# Patient Record
Sex: MALE | Race: BLACK OR AFRICAN AMERICAN | Employment: FULL TIME | URBAN - METROPOLITAN AREA
[De-identification: names, ages, dates, MRNs, and addresses within clinical notes are randomized per-mention and may not be internally consistent; named-entity substitution may affect disease eponyms.]

---

## 2017-02-20 ENCOUNTER — TELEPHONE (OUTPATIENT)
Dept: FAMILY MEDICINE CLINIC | Age: 52
End: 2017-02-20

## 2017-02-20 RX ORDER — AMLODIPINE BESYLATE AND BENAZEPRIL HYDROCHLORIDE 10; 20 MG/1; MG/1
CAPSULE ORAL
Qty: 30 CAPSULE | Refills: 3 | Status: SHIPPED | OUTPATIENT
Start: 2017-02-20 | End: 2017-07-09 | Stop reason: SDUPTHER

## 2017-04-11 ENCOUNTER — OFFICE VISIT (OUTPATIENT)
Dept: FAMILY MEDICINE CLINIC | Age: 52
End: 2017-04-11

## 2017-04-11 VITALS
HEART RATE: 81 BPM | HEIGHT: 74 IN | BODY MASS INDEX: 40.43 KG/M2 | DIASTOLIC BLOOD PRESSURE: 84 MMHG | SYSTOLIC BLOOD PRESSURE: 130 MMHG | WEIGHT: 315 LBS | OXYGEN SATURATION: 96 %

## 2017-04-11 DIAGNOSIS — M25.562 CHRONIC PAIN OF LEFT KNEE: Primary | ICD-10-CM

## 2017-04-11 DIAGNOSIS — G89.29 CHRONIC PAIN OF LEFT KNEE: Primary | ICD-10-CM

## 2017-04-11 DIAGNOSIS — I10 ESSENTIAL HYPERTENSION, BENIGN: ICD-10-CM

## 2017-04-11 PROCEDURE — 99213 OFFICE O/P EST LOW 20 MIN: CPT | Performed by: FAMILY MEDICINE

## 2017-04-11 RX ORDER — MELOXICAM 15 MG/1
15 TABLET ORAL DAILY
Qty: 30 TABLET | Refills: 3 | Status: SHIPPED | OUTPATIENT
Start: 2017-04-11 | End: 2019-10-03

## 2017-04-18 ENCOUNTER — OFFICE VISIT (OUTPATIENT)
Dept: ORTHOPEDIC SURGERY | Age: 52
End: 2017-04-18

## 2017-04-18 VITALS
DIASTOLIC BLOOD PRESSURE: 81 MMHG | HEART RATE: 71 BPM | SYSTOLIC BLOOD PRESSURE: 134 MMHG | HEIGHT: 74 IN | WEIGHT: 315 LBS | BODY MASS INDEX: 40.43 KG/M2

## 2017-04-18 DIAGNOSIS — M17.2 POST-TRAUMATIC OSTEOARTHRITIS OF BOTH KNEES: Primary | ICD-10-CM

## 2017-04-18 DIAGNOSIS — M25.562 LEFT KNEE PAIN, UNSPECIFIED CHRONICITY: ICD-10-CM

## 2017-04-18 PROCEDURE — 73564 X-RAY EXAM KNEE 4 OR MORE: CPT | Performed by: ORTHOPAEDIC SURGERY

## 2017-04-18 PROCEDURE — 73562 X-RAY EXAM OF KNEE 3: CPT | Performed by: ORTHOPAEDIC SURGERY

## 2017-04-18 PROCEDURE — 99204 OFFICE O/P NEW MOD 45 MIN: CPT | Performed by: ORTHOPAEDIC SURGERY

## 2017-04-26 ENCOUNTER — HOSPITAL ENCOUNTER (OUTPATIENT)
Dept: PHYSICAL THERAPY | Age: 52
Discharge: HOME | End: 2017-04-30
Admitting: ORTHOPAEDIC SURGERY

## 2017-05-24 ENCOUNTER — OFFICE VISIT (OUTPATIENT)
Dept: ORTHOPEDIC SURGERY | Age: 52
End: 2017-05-24

## 2017-05-24 VITALS
HEIGHT: 74 IN | WEIGHT: 315 LBS | SYSTOLIC BLOOD PRESSURE: 128 MMHG | DIASTOLIC BLOOD PRESSURE: 86 MMHG | HEART RATE: 82 BPM | BODY MASS INDEX: 40.43 KG/M2

## 2017-05-24 DIAGNOSIS — M17.12 PRIMARY OSTEOARTHRITIS OF LEFT KNEE: Primary | ICD-10-CM

## 2017-05-24 PROCEDURE — 20610 DRAIN/INJ JOINT/BURSA W/O US: CPT | Performed by: ORTHOPAEDIC SURGERY

## 2017-05-24 PROCEDURE — 99214 OFFICE O/P EST MOD 30 MIN: CPT | Performed by: ORTHOPAEDIC SURGERY

## 2017-06-26 ENCOUNTER — OFFICE VISIT (OUTPATIENT)
Dept: FAMILY MEDICINE CLINIC | Age: 52
End: 2017-06-26

## 2017-06-26 VITALS
HEART RATE: 81 BPM | SYSTOLIC BLOOD PRESSURE: 130 MMHG | HEIGHT: 74 IN | BODY MASS INDEX: 40.43 KG/M2 | DIASTOLIC BLOOD PRESSURE: 88 MMHG | OXYGEN SATURATION: 96 % | WEIGHT: 315 LBS

## 2017-06-26 DIAGNOSIS — E66.01 MORBID OBESITY, UNSPECIFIED OBESITY TYPE (HCC): ICD-10-CM

## 2017-06-26 DIAGNOSIS — Z00.00 WELL ADULT EXAM: Primary | ICD-10-CM

## 2017-06-26 DIAGNOSIS — E55.9 VITAMIN D DEFICIENCY: ICD-10-CM

## 2017-06-26 DIAGNOSIS — I10 ESSENTIAL HYPERTENSION, BENIGN: ICD-10-CM

## 2017-06-26 DIAGNOSIS — Z12.5 PROSTATE CANCER SCREENING: ICD-10-CM

## 2017-06-26 LAB
BASOPHILS ABSOLUTE: 0.1 K/UL (ref 0–0.2)
BASOPHILS RELATIVE PERCENT: 0.7 %
EOSINOPHILS ABSOLUTE: 0.2 K/UL (ref 0–0.6)
EOSINOPHILS RELATIVE PERCENT: 2.4 %
HCT VFR BLD CALC: 39.5 % (ref 40.5–52.5)
HEMOGLOBIN: 13.1 G/DL (ref 13.5–17.5)
LYMPHOCYTES ABSOLUTE: 1.3 K/UL (ref 1–5.1)
LYMPHOCYTES RELATIVE PERCENT: 18.9 %
MCH RBC QN AUTO: 34.6 PG (ref 26–34)
MCHC RBC AUTO-ENTMCNC: 33.3 G/DL (ref 31–36)
MCV RBC AUTO: 103.8 FL (ref 80–100)
MONOCYTES ABSOLUTE: 0.4 K/UL (ref 0–1.3)
MONOCYTES RELATIVE PERCENT: 5.5 %
NEUTROPHILS ABSOLUTE: 5.1 K/UL (ref 1.7–7.7)
NEUTROPHILS RELATIVE PERCENT: 72.5 %
PDW BLD-RTO: 15.2 % (ref 12.4–15.4)
PLATELET # BLD: 290 K/UL (ref 135–450)
PMV BLD AUTO: 8.7 FL (ref 5–10.5)
RBC # BLD: 3.8 M/UL (ref 4.2–5.9)
WBC # BLD: 7.1 K/UL (ref 4–11)

## 2017-06-26 PROCEDURE — 99396 PREV VISIT EST AGE 40-64: CPT | Performed by: FAMILY MEDICINE

## 2017-06-26 PROCEDURE — 93000 ELECTROCARDIOGRAM COMPLETE: CPT | Performed by: FAMILY MEDICINE

## 2017-06-26 ASSESSMENT — PATIENT HEALTH QUESTIONNAIRE - PHQ9
2. FEELING DOWN, DEPRESSED OR HOPELESS: 0
1. LITTLE INTEREST OR PLEASURE IN DOING THINGS: 0
SUM OF ALL RESPONSES TO PHQ QUESTIONS 1-9: 0
SUM OF ALL RESPONSES TO PHQ9 QUESTIONS 1 & 2: 0

## 2017-06-27 DIAGNOSIS — D75.89 MACROCYTOSIS: Primary | ICD-10-CM

## 2017-06-27 LAB
A/G RATIO: 1.1 (ref 1.1–2.2)
ALBUMIN SERPL-MCNC: 4.1 G/DL (ref 3.4–5)
ALP BLD-CCNC: 110 U/L (ref 40–129)
ALT SERPL-CCNC: 32 U/L (ref 10–40)
ANION GAP SERPL CALCULATED.3IONS-SCNC: 13 MMOL/L (ref 3–16)
AST SERPL-CCNC: 23 U/L (ref 15–37)
BILIRUB SERPL-MCNC: 0.5 MG/DL (ref 0–1)
BUN BLDV-MCNC: 15 MG/DL (ref 7–20)
CALCIUM SERPL-MCNC: 9.3 MG/DL (ref 8.3–10.6)
CHLORIDE BLD-SCNC: 103 MMOL/L (ref 99–110)
CHOLESTEROL, TOTAL: 164 MG/DL (ref 0–199)
CO2: 28 MMOL/L (ref 21–32)
CREAT SERPL-MCNC: 0.9 MG/DL (ref 0.9–1.3)
FOLATE: 8.19 NG/ML (ref 4.78–24.2)
GFR AFRICAN AMERICAN: >60
GFR NON-AFRICAN AMERICAN: >60
GLOBULIN: 3.6 G/DL
GLUCOSE BLD-MCNC: 99 MG/DL (ref 70–99)
HDLC SERPL-MCNC: 58 MG/DL (ref 40–60)
LDL CHOLESTEROL CALCULATED: 89 MG/DL
POTASSIUM SERPL-SCNC: 3.8 MMOL/L (ref 3.5–5.1)
PROSTATE SPECIFIC ANTIGEN: 0.86 NG/ML (ref 0–4)
SODIUM BLD-SCNC: 144 MMOL/L (ref 136–145)
TOTAL PROTEIN: 7.7 G/DL (ref 6.4–8.2)
TRIGL SERPL-MCNC: 84 MG/DL (ref 0–150)
TSH SERPL DL<=0.05 MIU/L-ACNC: 1.38 UIU/ML (ref 0.27–4.2)
VITAMIN B-12: 292 PG/ML (ref 211–911)
VITAMIN D 25-HYDROXY: 21.4 NG/ML
VLDLC SERPL CALC-MCNC: 17 MG/DL

## 2017-07-28 ENCOUNTER — OFFICE VISIT (OUTPATIENT)
Dept: ORTHOPEDIC SURGERY | Age: 52
End: 2017-07-28

## 2017-07-28 ENCOUNTER — TELEPHONE (OUTPATIENT)
Dept: ORTHOPEDIC SURGERY | Age: 52
End: 2017-07-28

## 2017-07-28 VITALS
HEART RATE: 74 BPM | WEIGHT: 315 LBS | SYSTOLIC BLOOD PRESSURE: 131 MMHG | BODY MASS INDEX: 40.43 KG/M2 | HEIGHT: 74 IN | DIASTOLIC BLOOD PRESSURE: 87 MMHG

## 2017-07-28 DIAGNOSIS — M17.12 PRIMARY OSTEOARTHRITIS OF LEFT KNEE: Primary | ICD-10-CM

## 2017-07-28 PROCEDURE — 99214 OFFICE O/P EST MOD 30 MIN: CPT | Performed by: ORTHOPAEDIC SURGERY

## 2017-08-04 ENCOUNTER — OFFICE VISIT (OUTPATIENT)
Dept: ORTHOPEDIC SURGERY | Age: 52
End: 2017-08-04

## 2017-08-04 VITALS
DIASTOLIC BLOOD PRESSURE: 87 MMHG | SYSTOLIC BLOOD PRESSURE: 131 MMHG | WEIGHT: 315 LBS | HEART RATE: 90 BPM | BODY MASS INDEX: 40.43 KG/M2 | HEIGHT: 74 IN

## 2017-08-04 DIAGNOSIS — M17.12 OSTEOARTHRITIS OF LEFT KNEE, UNSPECIFIED OSTEOARTHRITIS TYPE: Primary | ICD-10-CM

## 2017-08-04 PROCEDURE — 20610 DRAIN/INJ JOINT/BURSA W/O US: CPT | Performed by: ORTHOPAEDIC SURGERY

## 2017-08-29 ENCOUNTER — OFFICE VISIT (OUTPATIENT)
Dept: ORTHOPEDIC SURGERY | Age: 52
End: 2017-08-29

## 2017-08-29 VITALS
DIASTOLIC BLOOD PRESSURE: 93 MMHG | SYSTOLIC BLOOD PRESSURE: 152 MMHG | BODY MASS INDEX: 40.43 KG/M2 | HEIGHT: 74 IN | WEIGHT: 315 LBS | HEART RATE: 67 BPM

## 2017-08-29 DIAGNOSIS — M17.12 PRIMARY OSTEOARTHRITIS OF LEFT KNEE: Primary | ICD-10-CM

## 2017-08-29 PROCEDURE — 99213 OFFICE O/P EST LOW 20 MIN: CPT | Performed by: ORTHOPAEDIC SURGERY

## 2017-08-29 RX ORDER — DICLOFENAC SODIUM 75 MG/1
75 TABLET, DELAYED RELEASE ORAL 2 TIMES DAILY
Qty: 60 TABLET | Refills: 2 | Status: SHIPPED | OUTPATIENT
Start: 2017-08-29 | End: 2018-09-24 | Stop reason: SDUPTHER

## 2017-08-29 ASSESSMENT — ENCOUNTER SYMPTOMS
GASTROINTESTINAL NEGATIVE: 1
EYES NEGATIVE: 1
RESPIRATORY NEGATIVE: 1

## 2017-11-17 ENCOUNTER — OFFICE VISIT (OUTPATIENT)
Dept: ORTHOPEDIC SURGERY | Age: 52
End: 2017-11-17

## 2017-11-17 VITALS — BODY MASS INDEX: 40.43 KG/M2 | HEIGHT: 74 IN | WEIGHT: 315 LBS

## 2017-11-17 DIAGNOSIS — M25.561 PAIN IN BOTH KNEES, UNSPECIFIED CHRONICITY: ICD-10-CM

## 2017-11-17 DIAGNOSIS — M17.12 PRIMARY OSTEOARTHRITIS OF LEFT KNEE: Primary | ICD-10-CM

## 2017-11-17 DIAGNOSIS — M17.11 PRIMARY OSTEOARTHRITIS OF RIGHT KNEE: ICD-10-CM

## 2017-11-17 DIAGNOSIS — M25.562 PAIN IN BOTH KNEES, UNSPECIFIED CHRONICITY: ICD-10-CM

## 2017-11-17 DIAGNOSIS — E66.01 MORBID OBESITY WITH BMI OF 40.0-44.9, ADULT (HCC): ICD-10-CM

## 2017-11-17 PROCEDURE — G8427 DOCREV CUR MEDS BY ELIG CLIN: HCPCS | Performed by: ORTHOPAEDIC SURGERY

## 2017-11-17 PROCEDURE — G8417 CALC BMI ABV UP PARAM F/U: HCPCS | Performed by: ORTHOPAEDIC SURGERY

## 2017-11-17 PROCEDURE — 1036F TOBACCO NON-USER: CPT | Performed by: ORTHOPAEDIC SURGERY

## 2017-11-17 PROCEDURE — G8484 FLU IMMUNIZE NO ADMIN: HCPCS | Performed by: ORTHOPAEDIC SURGERY

## 2017-11-17 PROCEDURE — 3017F COLORECTAL CA SCREEN DOC REV: CPT | Performed by: ORTHOPAEDIC SURGERY

## 2017-11-17 PROCEDURE — 99214 OFFICE O/P EST MOD 30 MIN: CPT | Performed by: ORTHOPAEDIC SURGERY

## 2017-11-17 PROCEDURE — L1812 KO ELASTIC W/JOINTS PRE OTS: HCPCS | Performed by: ORTHOPAEDIC SURGERY

## 2017-11-17 PROCEDURE — 20610 DRAIN/INJ JOINT/BURSA W/O US: CPT | Performed by: ORTHOPAEDIC SURGERY

## 2017-11-17 ASSESSMENT — ENCOUNTER SYMPTOMS
GASTROINTESTINAL NEGATIVE: 1
RESPIRATORY NEGATIVE: 1
EYES NEGATIVE: 1

## 2017-11-17 NOTE — PROGRESS NOTES
HPI: Johnn Litten is pleasant 47 y.o. male presenting today for follow-up of his left knee. He has a 1.52 year history of left knee pain that began to worsen in February/March 2017. His pain is a 6-7/10 at rest, 3-4/10 with brace/activity, and a 6-7/10 with activity/no brace. He's tried formal supervised physical therapy, home exercise program, oral anti-inflammatory, OTC knee sleeve, corticosteroid injection once (5/24/2017), and a Synvisc injection (8/04/2017). The corticosteroid injection provided him approximately 2-3 weeks of relief, Synvisc injection approximately 3 months of relief. He has some alleviation via his home exercise program, diclofenac, and knee sleeve. Regarding his right knee, in June 2017 he began feeling pain in his right knee which he suspects is due to osteoarthritis in conjunction with compensation for his left knee. His pain is a 34/10 with rest and/or activity. He's tried physical therapy, home exercise program, and diclofenac without significant alleviation of his right knee pain. His left knee pain is worse than his right and his bilateral knee pain has become so severe that it disrupts his ability to fall asleep. Mr. Miroslava Ortiz has been working diligently on his weight loss and since April 2017 has lost 15 pounds. Unfortunately, his pain at times stops him from wanting to exercise and even just do his regular daily activities.          Pain Assessment  Location of Pain: Knee  Location Modifiers: Left  Severity of Pain: 7  Quality of Pain: Sharp, Aching, Dull  Duration of Pain: Persistent  Frequency of Pain: Constant  Aggravating Factors: Walking  Limiting Behavior: Yes  Relieving Factors: Rest  Result of Injury: No  Work-Related Injury: No  Are there other pain locations you wish to document?: No    Past Medical History:   Diagnosis Date    Chest pain 1/11    [facility]--neg labs and stress echo    Essential hypertension, benign     H/O: Bell's palsy 06    occ has sx    Morbid obesity ([address]  DANIELLE on CPAP     Unspecified sleep apnea         Past Surgical History:   Procedure Laterality Date    GASTRIC BAND  08    KNEE SURGERY Bilateral 1992 and  2005        Family History   Problem Relation Age of Onset    Arthritis Mother     Hearing Loss Mother     High Blood Pressure Mother     Cancer Mother 80     uterine    Hearing Loss Father     Heart Disease Father     High Blood Pressure Father     Stroke Father     Cancer Father 80     pancreas       Social History     Social History    Marital status:      Spouse name: N/A    Number of children: 2    Years of education: N/A     Occupational History     at [address] [place] [address] Dr History Main Topics    Smoking status: Never Smoker    Smokeless tobacco: Never Used    Alcohol use 0.6 oz/week     1 Glasses of wine per week      Comment: occasional    Drug use: No    Sexual activity: Yes     Partners: Female     Other Topics Concern    None     Social History Narrative    Exercise--no    + seatbelts    Happily --mom lives with them--2 kids and 1 kid with [employer]    Works>50--;lots of stress but good support       Current Outpatient Prescriptions   Medication Sig Dispense Refill    diclofenac (VOLTAREN) 75 MG EC tablet Take 1 tablet by mouth 2 times daily 1 PO Q BID WITH FOOD 60 tablet 2    amLODIPine-benazepril (LOTREL) 10-20 MG per capsule TAKE ONE CAPSULE BY MOUTH ONCE DAILY 90 capsule 3    carvedilol (COREG) 6.25 MG tablet TAKE TWO TABLETS BY MOUTH TWICE DAILY WITH MEALS 360 tablet 3    Omega-3 Fatty Acids (FISH OIL PO) Take by mouth      meloxicam (MOBIC) 15 MG tablet Take 1 tablet by mouth daily 30 tablet 3    Glucosamine-Chondroitin (GLUCOSAMINE CHONDR COMPLEX PO) Take by mouth      Multiple Vitamins-Minerals (THERAPEUTIC MULTIVITAMIN-MINERALS) tablet Take 1 tablet by mouth daily      sildenafil (VIAGRA) 100 MG tablet Take 1 tablet by mouth as needed for Erectile Dysfunction 6 tablet 4  aspirin 325 MG tablet Take 325 mg by mouth daily. No current facility-administered medications for this visit. No Known Allergies    Vital signs:  Ht 6' 2\" (1.88 m)   Wt (!) 340 lb (154.2 kg)   BMI 43.65 kg/m²        Neuro: Alert & oriented x 3,  normal,  no focal deficits noted. Normal affect. Eyes: sclera clear  Ears: Normal external ear  Mouth:  No perioral lesions  Pulm: Respirations unlabored and regular  Pulse: Regular rate and rhythm   Skin: Warm, well perfused      Right knee exam  Examination of the right knee shows a small effusion. Alignment is within normal limits. Range of motion shows mild flexion contracture with restriction of full knee flexion. There is pain at the extreme of knee flexion. There is medial and lateral joint tenderness. There is mild patellar crepitus. There is no cruciate or collateral ligament instability. Quadriceps strength is 5-. Skin is warm and well perfused. Sensation is intact. Left knee exam  Examination of the  left knee shows a small effusion. Alignment is within normal limits. Range of motion shows mild flexion contracture with restriction of full knee flexion. There is pain at the extreme of knee flexion. There is medial and lateral joint tenderness. There is mild patellar crepitus. There is no cruciate or collateral ligament instability. Quadriceps strength is 5-. Skin is warm and well perfused. Sensation is intact. DX:   Encounter Diagnoses   Name Primary?  Primary osteoarthritis of left knee Yes    Primary osteoarthritis of right knee     Pain in both knees, unspecified chronicity     Morbid obesity with BMI of 40.0-44.9, adult [facility])              PLAN: Mr. Daniel Sim continues to have bilateral symptomatic knee osteoarthritis, left greater than right. Conservative and surgical treatment options were discussed thoroughly.  Mr. Daniel Sim would like to proceed with getting fitted for a left knee brace then undergo bilateral corticosteroid injections. He chose the [employer] Reaction left knee brace. After he was fitted for the brace bilateral corticosteroid injections were given afterwhich he reported alleviation of his pain, please see procedure note below. He understands that he'll not be able to undergo a left total knee replacement for up to three months after the corticosteroid injection. He is in agreement with this and will use the next few months to work on weight loss. Mr. Harper Chung will continue taking Diclofenac and will discontinue usage if he has any GI upset and/or other side effects. If he decides to proceed with a total knee replacement we would refer him to Dr. Orly Birmingham. All questions were answered to patient's satisfaction and was encouraged to call with any further questions or concerns. Mr. Harper Chung was instructed to follow-up in 4-6 weeks and/or as needed. Je Anyiks is in full agreement with the plan. Procedure: The indications and risks of steroid injection as well as treatment alternatives were discussed with the patient who consented to the procedure. Under sterile conditions and after informed consent was obtained the patient was given an injection into the left knee. 2cc 40 mg of Depo-Medrol and 4 mL of 1% lidocaine were placed in the knee after it was prepped with chlorhexidine. This resulted in good relief of symptoms. There were no complications. The patient was advised to ice the knee this evening and to avoid vigorous activities for the next 2 days. They were advised to call us if there was any erythema, enduration, swelling or increasing pain. The indications and risks of steroid injection as well as treatment alternatives were discussed with the patient who consented to the procedure. Under sterile conditions and after informed consent was obtained the patient was given an injection into the right knee.   2cc 40 mg of Depo-Medrol and 4 mL of 1% lidocaine were placed in the knee after it

## 2017-11-17 NOTE — PROGRESS NOTES
Cortisone injection: bilateral injections     2cc depo medrol 40mg    CLN:[id number]  Exp:01/2020  Ndc:7219-8471-81    4cc lidocaine 1%hcl    Lot:-[id number]  Exp:sept 1 2018  Ndc: [id number]

## 2017-12-21 RX ORDER — DICLOFENAC SODIUM 75 MG/1
75 TABLET, DELAYED RELEASE ORAL 2 TIMES DAILY
Qty: 60 TABLET | Refills: 2 | Status: SHIPPED | OUTPATIENT
Start: 2017-12-21 | End: 2019-10-03

## 2018-03-27 ENCOUNTER — OFFICE VISIT (OUTPATIENT)
Dept: ORTHOPEDIC SURGERY | Age: 53
End: 2018-03-27

## 2018-03-27 VITALS
SYSTOLIC BLOOD PRESSURE: 148 MMHG | HEIGHT: 74 IN | BODY MASS INDEX: 40.43 KG/M2 | DIASTOLIC BLOOD PRESSURE: 96 MMHG | HEART RATE: 73 BPM | WEIGHT: 315 LBS

## 2018-03-27 DIAGNOSIS — M17.12 PRIMARY OSTEOARTHRITIS OF LEFT KNEE: Primary | ICD-10-CM

## 2018-03-27 PROCEDURE — 3017F COLORECTAL CA SCREEN DOC REV: CPT | Performed by: ORTHOPAEDIC SURGERY

## 2018-03-27 PROCEDURE — G8484 FLU IMMUNIZE NO ADMIN: HCPCS | Performed by: ORTHOPAEDIC SURGERY

## 2018-03-27 PROCEDURE — G8428 CUR MEDS NOT DOCUMENT: HCPCS | Performed by: ORTHOPAEDIC SURGERY

## 2018-03-27 PROCEDURE — 99213 OFFICE O/P EST LOW 20 MIN: CPT | Performed by: ORTHOPAEDIC SURGERY

## 2018-03-27 PROCEDURE — 1036F TOBACCO NON-USER: CPT | Performed by: ORTHOPAEDIC SURGERY

## 2018-03-27 PROCEDURE — G8417 CALC BMI ABV UP PARAM F/U: HCPCS | Performed by: ORTHOPAEDIC SURGERY

## 2018-03-27 ASSESSMENT — ENCOUNTER SYMPTOMS: BACK PAIN: 1

## 2018-03-27 NOTE — PROGRESS NOTES
Review of Systems   Cardiovascular:        Hypertension    Musculoskeletal: Positive for back pain, joint pain and neck pain. Left knee pain   All other systems reviewed and are negative.
instability. Quadriceps strength is 5-. Skin is warm and well perfused. Sensation is intact. Right knee exam    Examination of the right knee shows normal alignment and full range of motion. The quadriceps are well-developed. Small effusion is present. No soft tissue swelling is noted. The patient has no tenderness to palpation about the joint. There is a negative Mike sign. The Lachman sign is negative. The anterior and posterior drawer signs are negative. The cruciate and collateral ligaments are intact. The patient has a negative hyperflexion test.  No bursal swelling is present. There is mild crepitus. There is no pain with compression of the patella. The patella apprehension sign is negative. Q angles are within normal limits. There is no pretibial edema. Pulses are symmetric. Sensation is intact to light-touch. I reviewed the patient's last x-rays and a shows that the femoral osteoarthritis. Impression: Left knee osteoarthritis. I believe the patient is a candidate for total knee arthroplasty. For him to see Dr. Toma Kyle and I'll see him back as needed. Greater than 50% of the visit was spent counseling the patient. I personally reviewed the patient's pain scale, review of systems, family history, social history, past medical history, allergies and medications. 13 point review of systems was collected today and is included in the medical record. Tripp Castro MD  Sports Medicine, Knee and Shoulder Surgery    This dictation was performed with a verbal recognition program [facility]) and it was checked for errors. It is possible that there are still dictated errors within this office note. If so, please bring any errors to my attention for an addendum. All efforts were made to ensure that this office note is accurate.

## 2018-05-16 DIAGNOSIS — Z51.81 ENCOUNTER FOR MONITORING CHRONIC NSAID THERAPY: Primary | ICD-10-CM

## 2018-05-16 DIAGNOSIS — Z79.1 ENCOUNTER FOR MONITORING CHRONIC NSAID THERAPY: Primary | ICD-10-CM

## 2018-05-16 RX ORDER — DICLOFENAC SODIUM 75 MG/1
TABLET, DELAYED RELEASE ORAL
Qty: 60 TABLET | Refills: 2 | OUTPATIENT
Start: 2018-05-16

## 2018-05-29 DIAGNOSIS — Z51.81 ENCOUNTER FOR MONITORING CHRONIC NSAID THERAPY: ICD-10-CM

## 2018-05-29 DIAGNOSIS — Z79.1 ENCOUNTER FOR MONITORING CHRONIC NSAID THERAPY: ICD-10-CM

## 2018-05-30 LAB
BUN BLDV-MCNC: 16 MG/DL (ref 7–20)
CREAT SERPL-MCNC: 0.8 MG/DL (ref 0.9–1.3)
GFR AFRICAN AMERICAN: >60
GFR NON-AFRICAN AMERICAN: >60

## 2018-07-30 RX ORDER — SILDENAFIL 100 MG/1
100 TABLET, FILM COATED ORAL PRN
Qty: 6 TABLET | Refills: 4 | Status: SHIPPED | OUTPATIENT
Start: 2018-07-30 | End: 2018-08-01 | Stop reason: SDUPTHER

## 2018-07-30 RX ORDER — AMLODIPINE BESYLATE AND BENAZEPRIL HYDROCHLORIDE 10; 20 MG/1; MG/1
CAPSULE ORAL
Qty: 90 CAPSULE | Refills: 3 | Status: SHIPPED | OUTPATIENT
Start: 2018-07-30 | End: 2018-11-07 | Stop reason: SINTOL

## 2018-08-01 RX ORDER — SILDENAFIL 100 MG/1
100 TABLET, FILM COATED ORAL DAILY PRN
Qty: 6 TABLET | Refills: 4 | Status: SHIPPED | OUTPATIENT
Start: 2018-08-01

## 2018-08-06 ENCOUNTER — OFFICE VISIT (OUTPATIENT)
Dept: ORTHOPEDIC SURGERY | Age: 53
End: 2018-08-06

## 2018-08-06 VITALS
BODY MASS INDEX: 40.43 KG/M2 | HEIGHT: 74 IN | SYSTOLIC BLOOD PRESSURE: 143 MMHG | HEART RATE: 83 BPM | DIASTOLIC BLOOD PRESSURE: 81 MMHG | WEIGHT: 315 LBS

## 2018-08-06 DIAGNOSIS — M16.11 PRIMARY OSTEOARTHRITIS OF RIGHT HIP: ICD-10-CM

## 2018-08-06 DIAGNOSIS — M25.551 RIGHT HIP PAIN: Primary | ICD-10-CM

## 2018-08-06 PROCEDURE — 1036F TOBACCO NON-USER: CPT | Performed by: ORTHOPAEDIC SURGERY

## 2018-08-06 PROCEDURE — 3017F COLORECTAL CA SCREEN DOC REV: CPT | Performed by: ORTHOPAEDIC SURGERY

## 2018-08-06 PROCEDURE — 99214 OFFICE O/P EST MOD 30 MIN: CPT | Performed by: ORTHOPAEDIC SURGERY

## 2018-08-06 PROCEDURE — G8417 CALC BMI ABV UP PARAM F/U: HCPCS | Performed by: ORTHOPAEDIC SURGERY

## 2018-08-06 PROCEDURE — G8427 DOCREV CUR MEDS BY ELIG CLIN: HCPCS | Performed by: ORTHOPAEDIC SURGERY

## 2018-08-06 RX ORDER — METHYLPREDNISOLONE 4 MG/1
4 TABLET ORAL SEE ADMIN INSTRUCTIONS
Qty: 1 KIT | Refills: 0 | Status: SHIPPED | OUTPATIENT
Start: 2018-08-06 | End: 2018-08-12

## 2018-08-06 NOTE — PROGRESS NOTES
Patient states 2year-old illness seen for evaluation of right sided buttock pain which is been ongoing for last 3 weeks and has been progressive over the last 3 days. Doesn't report any specific injury. Pain doesn't radiate. No paresthesias. No fevers and no chills. He reports his mother has similar problems. He has not had this in the past.  His treatment has been the diclofenac he was placed on for his knees. I trying other medicines. He has difficulty lying down.     Pain Assessment  Location of Pain: Pelvis (Hip)  Location Modifiers: Right  Severity of Pain: 10  Quality of Pain: Sharp  Duration of Pain: Persistent  Frequency of Pain: Constant  Date Pain First Started:  (3-4 weeks)  Aggravating Factors: Walking (twisting, sitting, laying down)  Limiting Behavior: Yes  Relieving Factors:  (not much, rubs for a short period of time)  Result of Injury: No  Are there other pain locations you wish to document?: No    Past Medical History:   Diagnosis Date    Chest pain 1/11    [facility]--neg labs and stress echo    Essential hypertension, benign     H/O: Bell's palsy 06    occ has sx    Morbid obesity ([address]     DANIELLE on CPAP     Unspecified sleep apnea         Past Surgical History:   Procedure Laterality Date    GASTRIC BAND  08    KNEE SURGERY Bilateral 1992 and  2005        Family History   Problem Relation Age of Onset    Arthritis Mother     Hearing Loss Mother     High Blood Pressure Mother     Cancer Mother 80        uterine    Hearing Loss Father     Heart Disease Father     High Blood Pressure Father     Stroke Father     Cancer Father 80        pancreas       Social History     Social History    Marital status:      Spouse name: N/A    Number of children: 2    Years of education: N/A     Occupational History     at [address] [place] [address] Dr History Main Topics    Smoking status: Never Smoker    Smokeless tobacco: Never Used    Alcohol use 0.6 oz/week     1 Glasses of wine per week

## 2018-08-06 NOTE — PROGRESS NOTES
Patient comes in the office for an evaluation of his right hip. He reports that his pain been present for 3 weeks with no known injury. He describes his pain as sharp and achy and notes that its present when he is laying down or applying weight. Pain wakes him up during the night. Patient states that he is currently taking diclofenac and tylenol which helped a little. He notes that his pain is more in his buttocks / lower back area and has increased in the past couple of days. He states that he increased his time on the exercise bike. He denies fevers / chills. Family history of arthritis. X-rays taken in the office today and shows arthritis.      - osteoarthritis, bilateral hips    - blood work  - medrol dose pack, discontinue diclofenac     - mri pelvis, follow up after study

## 2018-08-06 NOTE — PROGRESS NOTES
Review of Systems   Constitutional: Positive for weight loss. Cardiovascular:        Hypertension    Musculoskeletal:        Right hip pain   All other systems reviewed and are negative.

## 2018-08-09 DIAGNOSIS — M16.11 PRIMARY OSTEOARTHRITIS OF RIGHT HIP: ICD-10-CM

## 2018-08-09 DIAGNOSIS — M25.551 RIGHT HIP PAIN: ICD-10-CM

## 2018-08-09 LAB
BASOPHILS ABSOLUTE: 0.1 K/UL (ref 0–0.2)
BASOPHILS RELATIVE PERCENT: 0.7 %
EOSINOPHILS ABSOLUTE: 0.1 K/UL (ref 0–0.6)
EOSINOPHILS RELATIVE PERCENT: 1.2 %
HCT VFR BLD CALC: 37.6 % (ref 40.5–52.5)
HEMOGLOBIN: 12.3 G/DL (ref 13.5–17.5)
LYMPHOCYTES ABSOLUTE: 1.5 K/UL (ref 1–5.1)
LYMPHOCYTES RELATIVE PERCENT: 18.9 %
MCH RBC QN AUTO: 32.8 PG (ref 26–34)
MCHC RBC AUTO-ENTMCNC: 32.8 G/DL (ref 31–36)
MCV RBC AUTO: 100.1 FL (ref 80–100)
MONOCYTES ABSOLUTE: 0.7 K/UL (ref 0–1.3)
MONOCYTES RELATIVE PERCENT: 8.8 %
NEUTROPHILS ABSOLUTE: 5.7 K/UL (ref 1.7–7.7)
NEUTROPHILS RELATIVE PERCENT: 70.4 %
PDW BLD-RTO: 14.9 % (ref 12.4–15.4)
PLATELET # BLD: 275 K/UL (ref 135–450)
PMV BLD AUTO: 8.5 FL (ref 5–10.5)
RBC # BLD: 3.76 M/UL (ref 4.2–5.9)
SEDIMENTATION RATE, ERYTHROCYTE: 29 MM/HR (ref 0–20)
WBC # BLD: 8.1 K/UL (ref 4–11)

## 2018-08-16 DIAGNOSIS — M46.1 SACROILIITIS (HCC): Primary | ICD-10-CM

## 2018-09-24 ENCOUNTER — OFFICE VISIT (OUTPATIENT)
Dept: PULMONOLOGY | Age: 53
End: 2018-09-24
Payer: COMMERCIAL

## 2018-09-24 VITALS
SYSTOLIC BLOOD PRESSURE: 142 MMHG | HEART RATE: 83 BPM | WEIGHT: 315 LBS | OXYGEN SATURATION: 98 % | DIASTOLIC BLOOD PRESSURE: 78 MMHG | BODY MASS INDEX: 40.43 KG/M2 | HEIGHT: 74 IN

## 2018-09-24 DIAGNOSIS — E11.9 CONTROLLED TYPE 2 DIABETES MELLITUS WITHOUT COMPLICATION, WITHOUT LONG-TERM CURRENT USE OF INSULIN (HCC): Chronic | ICD-10-CM

## 2018-09-24 DIAGNOSIS — I25.10 CORONARY ARTERY DISEASE INVOLVING NATIVE CORONARY ARTERY OF NATIVE HEART WITHOUT ANGINA PECTORIS: Chronic | ICD-10-CM

## 2018-09-24 DIAGNOSIS — I48.91 ATRIAL FIBRILLATION, UNSPECIFIED TYPE (HCC): Chronic | ICD-10-CM

## 2018-09-24 DIAGNOSIS — G47.33 OBSTRUCTIVE SLEEP APNEA SYNDROME: Primary | Chronic | ICD-10-CM

## 2018-09-24 PROCEDURE — 99214 OFFICE O/P EST MOD 30 MIN: CPT | Performed by: NURSE PRACTITIONER

## 2018-09-24 ASSESSMENT — ENCOUNTER SYMPTOMS
ABDOMINAL PAIN: 0
COUGH: 0
SHORTNESS OF BREATH: 0
ABDOMINAL DISTENTION: 0
SINUS PRESSURE: 0
RHINORRHEA: 0
APNEA: 0

## 2018-09-24 ASSESSMENT — SLEEP AND FATIGUE QUESTIONNAIRES
HOW LIKELY ARE YOU TO NOD OFF OR FALL ASLEEP WHILE SITTING AND READING: 2
HOW LIKELY ARE YOU TO NOD OFF OR FALL ASLEEP WHILE WATCHING TV: 2
HOW LIKELY ARE YOU TO NOD OFF OR FALL ASLEEP WHEN YOU ARE A PASSENGER IN A CAR FOR AN HOUR WITHOUT A BREAK: 3
HOW LIKELY ARE YOU TO NOD OFF OR FALL ASLEEP WHILE SITTING QUIETLY AFTER LUNCH WITHOUT ALCOHOL: 1
HOW LIKELY ARE YOU TO NOD OFF OR FALL ASLEEP WHILE LYING DOWN TO REST IN THE AFTERNOON WHEN CIRCUMSTANCES PERMIT: 2
HOW LIKELY ARE YOU TO NOD OFF OR FALL ASLEEP WHILE SITTING INACTIVE IN A PUBLIC PLACE: 2
ESS TOTAL SCORE: 13
HOW LIKELY ARE YOU TO NOD OFF OR FALL ASLEEP WHILE SITTING AND TALKING TO SOMEONE: 0
HOW LIKELY ARE YOU TO NOD OFF OR FALL ASLEEP IN A CAR, WHILE STOPPED FOR A FEW MINUTES IN TRAFFIC: 1

## 2018-09-24 NOTE — PROGRESS NOTES
Solange Field         : 1965    Diagnosis: [x] DANIELLE (G47.33) [] CSA (G47.31) [] Apnea (G47.30)   Length of Need: [x] 15 Months [] 99 Months [] Other:    Machine (MORA!): [x] Respironics Dream Station      Auto [] ResMed AirSense     Auto [] Other:     []  CPAP () [] Bilevel ()   Mode: [] Auto [] Spontaneous    Mode: [] Auto [] Spontaneous                            Comfort Settings:   - Ramp Pressure:  cmH2O                                        - Ramp time: 15 min                                     -  Flex/EPR - 3 full time                                    - For ResMed Bilevel (TiMax-4 sec   TiMin- 0.2 sec)     Humidifier: [x] Heated ()        [x] Water chamber replacement ()/ 1 per 6 months        Mask:   [x] Nasal () /1 per 3 months [] Full Face () /1 per 3 months   [x] Patient choice -Size and fit mask [] Patient Choice - Size and fit mask   [] Dispense:  [] Dispense:    [x] Headgear () / 1 per 3 months [] Headgear () / 1 per 3 months   [x] Replacement Nasal Cushion ()/2 per month [] Interface Replacement ()/1 per month   [] Replacement Nasal Pillows ()/2 per month         Tubing: [x] Heated ()/1 per 3 months    [] Standard ()/1 per 3 months [] Other:           Filters: [x] Non-disposable ()/1 per 6 months     [x] Ultra-Fine, Disposable ()/2 per month        Miscellaneous: [] Chin Strap ()/ 1 per 6 months [] O2 bleed-in:       LPM   [] Oxymetry on CPAP/Bilevel []  Other:          Start Order Date: 18    MEDICAL JUSTIFICATION:  I, the undersigned, certify that the above prescribed supplies are medically necessary for this patients wellbeing. In my opinion, the supplies are both reasonable and necessary in reference to accepted standards of medicalpractice in treatment of this patients condition.     Giovanna Brian, APRN - CNP      NPI: [id number]       Order Signed Date: 18    Electronically signed by

## 2018-09-24 NOTE — PROGRESS NOTES
sx    Morbid obesity ([facility]     DANIELLE on CPAP     Unspecified sleep apnea        Past Surgical History:   Procedure Laterality Date    GASTRIC BAND  08    KNEE SURGERY Bilateral 1992 and  2005        Family History   Problem Relation Age of Onset    Arthritis Mother     Hearing Loss Mother     High Blood Pressure Mother     Cancer Mother 80        uterine    Hearing Loss Father     Heart Disease Father     High Blood Pressure Father     Stroke Father     Cancer Father 80        pancreas       Vitals:  Weight BMI   Wt Readings from Last 3 Encounters:   09/24/18 (!) 351 lb (159.2 kg)   08/06/18 (!) 340 lb (154.2 kg)   03/27/18 (!) 340 lb (154.2 kg)    Body mass index is 45.07 kg/m². BP HR SaO2   BP Readings from Last 3 Encounters:   09/24/18 (!) 142/78   08/06/18 (!) 143/81   03/27/18 (!) 148/96    Pulse Readings from Last 3 Encounters:   09/24/18 83   08/06/18 83   03/27/18 73    SpO2 Readings from Last 3 Encounters:   09/24/18 98%   06/26/17 96%   04/11/17 96%        Assessment:     Visit Diagnoses and Associated Orders     Obstructive sleep apnea syndrome   (Stable)  -  Primary         Atrial fibrillation, unspecified type (HCC)   (Stable)           Coronary artery disease involving native coronary artery of native heart without angina pectoris   (Stable)           Controlled type 2 diabetes mellitus without complication, without long-term current use of insulin (HCC)   (Stable)                 The primary encounter diagnosis was Obstructive sleep apnea syndrome. Diagnoses of Atrial fibrillation, unspecified type ([address] Coronary artery disease involving native coronary artery of native heart without angina pectoris, and Controlled type 2 diabetes mellitus without complication, without long-term current use of insulin ([facility] were also pertinent to this visit. The chronic medical conditions listed are directly related to the primary diagnosis listed above.   The management of the primary diagnosis affects the secondary diagnosis and vice versa. Plan:   - Educated patient and reviewed compliance download with pt.    -Supplies and parts as needed for his machine, these are medically necessary.    - Patient using Other [facility]  for supplies  -Continue medications per his PCP and other physicians.   -Limit caffeine use after 3pm.    -Encouraged him to work on weight loss through diet and exercise. - Nikki RT to the room for education on the humidifier   -F/U: 12 month. No orders of the defined types were placed in this encounter. No orders of the defined types were placed in this encounter. No orders of the defined types were placed in this encounter.       Christiano Quevedo, MATEUS, RN, CNP

## 2018-10-03 ENCOUNTER — TELEPHONE (OUTPATIENT)
Dept: PULMONOLOGY | Age: 53
End: 2018-10-03

## 2018-10-03 NOTE — TELEPHONE ENCOUNTER
Pt called stating he is having issues w/supply order and wants it sent to another [facility]. [phone]

## 2018-10-15 RX ORDER — CARVEDILOL 6.25 MG/1
TABLET ORAL
Qty: 360 TABLET | Refills: 3 | Status: SHIPPED | OUTPATIENT
Start: 2018-10-15 | End: 2020-03-20

## 2018-11-07 ENCOUNTER — OFFICE VISIT (OUTPATIENT)
Dept: FAMILY MEDICINE CLINIC | Age: 53
End: 2018-11-07
Payer: COMMERCIAL

## 2018-11-07 VITALS
HEART RATE: 77 BPM | OXYGEN SATURATION: 98 % | RESPIRATION RATE: 16 BRPM | BODY MASS INDEX: 40.43 KG/M2 | WEIGHT: 315 LBS | HEIGHT: 74 IN | SYSTOLIC BLOOD PRESSURE: 138 MMHG | DIASTOLIC BLOOD PRESSURE: 82 MMHG

## 2018-11-07 DIAGNOSIS — E55.9 VITAMIN D DEFICIENCY: ICD-10-CM

## 2018-11-07 DIAGNOSIS — G47.33 OSA ON CPAP: ICD-10-CM

## 2018-11-07 DIAGNOSIS — Z12.5 PROSTATE CANCER SCREENING: ICD-10-CM

## 2018-11-07 DIAGNOSIS — Z00.00 WELL ADULT EXAM: Primary | ICD-10-CM

## 2018-11-07 DIAGNOSIS — G89.29 CHRONIC LOW BACK PAIN WITHOUT SCIATICA, UNSPECIFIED BACK PAIN LATERALITY: ICD-10-CM

## 2018-11-07 DIAGNOSIS — Z99.89 OSA ON CPAP: ICD-10-CM

## 2018-11-07 DIAGNOSIS — M54.50 CHRONIC LOW BACK PAIN WITHOUT SCIATICA, UNSPECIFIED BACK PAIN LATERALITY: ICD-10-CM

## 2018-11-07 DIAGNOSIS — I10 ESSENTIAL HYPERTENSION, BENIGN: ICD-10-CM

## 2018-11-07 DIAGNOSIS — Z23 NEED FOR SHINGLES VACCINE: ICD-10-CM

## 2018-11-07 PROCEDURE — G8484 FLU IMMUNIZE NO ADMIN: HCPCS | Performed by: FAMILY MEDICINE

## 2018-11-07 PROCEDURE — 99396 PREV VISIT EST AGE 40-64: CPT | Performed by: FAMILY MEDICINE

## 2018-11-07 RX ORDER — LOSARTAN POTASSIUM 100 MG/1
100 TABLET ORAL DAILY
Qty: 30 TABLET | Refills: 5 | Status: SHIPPED | OUTPATIENT
Start: 2018-11-07 | End: 2019-05-30 | Stop reason: SDUPTHER

## 2018-11-07 RX ORDER — AMLODIPINE BESYLATE 10 MG/1
10 TABLET ORAL DAILY
Qty: 30 TABLET | Refills: 5 | Status: SHIPPED | OUTPATIENT
Start: 2018-11-07 | End: 2019-06-04 | Stop reason: SDUPTHER

## 2018-11-07 NOTE — PROGRESS NOTES
nerve deficit. Coordination normal.   Skin: Skin is warm, dry and intact. No suspicious lesions are noted. Psychiatric: He has a normal mood and affect. His speech is normal and behavior is normal. Judgment, cognition and memory are normal.     Assessment/Plan:    There are no diagnoses linked to this encounter.

## 2018-11-10 LAB
A/G RATIO: 1.5 (ref 1.1–2.2)
ALBUMIN SERPL-MCNC: 4.4 G/DL (ref 3.4–5)
ALP BLD-CCNC: 112 U/L (ref 40–129)
ALT SERPL-CCNC: 32 U/L (ref 10–40)
ANION GAP SERPL CALCULATED.3IONS-SCNC: 12 MMOL/L (ref 3–16)
AST SERPL-CCNC: 24 U/L (ref 15–37)
BASOPHILS ABSOLUTE: 0.1 K/UL (ref 0–0.2)
BASOPHILS RELATIVE PERCENT: 0.8 %
BILIRUB SERPL-MCNC: 0.3 MG/DL (ref 0–1)
BUN BLDV-MCNC: 14 MG/DL (ref 7–20)
CALCIUM SERPL-MCNC: 9.6 MG/DL (ref 8.3–10.6)
CHLORIDE BLD-SCNC: 107 MMOL/L (ref 99–110)
CHOLESTEROL, TOTAL: 142 MG/DL (ref 0–199)
CO2: 27 MMOL/L (ref 21–32)
CREAT SERPL-MCNC: 0.9 MG/DL (ref 0.9–1.3)
EOSINOPHILS ABSOLUTE: 0.2 K/UL (ref 0–0.6)
EOSINOPHILS RELATIVE PERCENT: 2.8 %
GFR AFRICAN AMERICAN: >60
GFR NON-AFRICAN AMERICAN: >60
GLOBULIN: 3 G/DL
GLUCOSE BLD-MCNC: 105 MG/DL (ref 70–99)
HCT VFR BLD CALC: 38.9 % (ref 40.5–52.5)
HDLC SERPL-MCNC: 51 MG/DL (ref 40–60)
HEMOGLOBIN: 12.9 G/DL (ref 13.5–17.5)
LDL CHOLESTEROL CALCULATED: 79 MG/DL
LYMPHOCYTES ABSOLUTE: 0.9 K/UL (ref 1–5.1)
LYMPHOCYTES RELATIVE PERCENT: 14.4 %
MCH RBC QN AUTO: 33.7 PG (ref 26–34)
MCHC RBC AUTO-ENTMCNC: 33.1 G/DL (ref 31–36)
MCV RBC AUTO: 101.8 FL (ref 80–100)
MONOCYTES ABSOLUTE: 0.5 K/UL (ref 0–1.3)
MONOCYTES RELATIVE PERCENT: 7.4 %
NEUTROPHILS ABSOLUTE: 4.7 K/UL (ref 1.7–7.7)
NEUTROPHILS RELATIVE PERCENT: 74.6 %
PDW BLD-RTO: 13.8 % (ref 12.4–15.4)
PLATELET # BLD: 286 K/UL (ref 135–450)
PMV BLD AUTO: 8.7 FL (ref 5–10.5)
POTASSIUM SERPL-SCNC: 3.8 MMOL/L (ref 3.5–5.1)
PROSTATE SPECIFIC ANTIGEN: 0.75 NG/ML (ref 0–4)
RBC # BLD: 3.82 M/UL (ref 4.2–5.9)
SODIUM BLD-SCNC: 146 MMOL/L (ref 136–145)
TOTAL PROTEIN: 7.4 G/DL (ref 6.4–8.2)
TRIGL SERPL-MCNC: 60 MG/DL (ref 0–150)
TSH SERPL DL<=0.05 MIU/L-ACNC: 1.24 UIU/ML (ref 0.27–4.2)
VITAMIN D 25-HYDROXY: 13.5 NG/ML
VLDLC SERPL CALC-MCNC: 12 MG/DL
WBC # BLD: 6.3 K/UL (ref 4–11)

## 2018-11-11 LAB
ESTIMATED AVERAGE GLUCOSE: 91.1 MG/DL
HBA1C MFR BLD: 4.8 %

## 2019-05-30 RX ORDER — LOSARTAN POTASSIUM 100 MG/1
100 TABLET ORAL DAILY
Qty: 30 TABLET | Refills: 5 | Status: SHIPPED | OUTPATIENT
Start: 2019-05-30 | End: 2020-01-08

## 2019-06-04 RX ORDER — AMLODIPINE BESYLATE 10 MG/1
TABLET ORAL
Qty: 30 TABLET | Refills: 5 | Status: SHIPPED | OUTPATIENT
Start: 2019-06-04 | End: 2019-10-07 | Stop reason: SDUPTHER

## 2019-10-03 ENCOUNTER — OFFICE VISIT (OUTPATIENT)
Dept: PULMONOLOGY | Age: 54
End: 2019-10-03
Payer: COMMERCIAL

## 2019-10-03 VITALS
BODY MASS INDEX: 40.43 KG/M2 | HEART RATE: 76 BPM | SYSTOLIC BLOOD PRESSURE: 128 MMHG | WEIGHT: 315 LBS | OXYGEN SATURATION: 98 % | DIASTOLIC BLOOD PRESSURE: 80 MMHG | HEIGHT: 74 IN

## 2019-10-03 DIAGNOSIS — E66.01 MORBID OBESITY (HCC): ICD-10-CM

## 2019-10-03 DIAGNOSIS — K21.9 GASTROESOPHAGEAL REFLUX DISEASE WITHOUT ESOPHAGITIS: ICD-10-CM

## 2019-10-03 DIAGNOSIS — Z99.89 OSA ON CPAP: Primary | ICD-10-CM

## 2019-10-03 DIAGNOSIS — I10 ESSENTIAL HYPERTENSION, BENIGN: ICD-10-CM

## 2019-10-03 DIAGNOSIS — G47.33 OSA ON CPAP: Primary | ICD-10-CM

## 2019-10-03 PROCEDURE — G8427 DOCREV CUR MEDS BY ELIG CLIN: HCPCS | Performed by: NURSE PRACTITIONER

## 2019-10-03 PROCEDURE — 3017F COLORECTAL CA SCREEN DOC REV: CPT | Performed by: NURSE PRACTITIONER

## 2019-10-03 PROCEDURE — 1036F TOBACCO NON-USER: CPT | Performed by: NURSE PRACTITIONER

## 2019-10-03 PROCEDURE — 99214 OFFICE O/P EST MOD 30 MIN: CPT | Performed by: NURSE PRACTITIONER

## 2019-10-03 PROCEDURE — G8417 CALC BMI ABV UP PARAM F/U: HCPCS | Performed by: NURSE PRACTITIONER

## 2019-10-03 PROCEDURE — G8484 FLU IMMUNIZE NO ADMIN: HCPCS | Performed by: NURSE PRACTITIONER

## 2019-10-03 ASSESSMENT — ENCOUNTER SYMPTOMS
EYE PAIN: 0
APNEA: 0
EYE REDNESS: 0
SHORTNESS OF BREATH: 0
COUGH: 0
ABDOMINAL PAIN: 0
SINUS PRESSURE: 0
ABDOMINAL DISTENTION: 0
RHINORRHEA: 0

## 2019-10-03 ASSESSMENT — SLEEP AND FATIGUE QUESTIONNAIRES
HOW LIKELY ARE YOU TO NOD OFF OR FALL ASLEEP IN A CAR, WHILE STOPPED FOR A FEW MINUTES IN TRAFFIC: 0
HOW LIKELY ARE YOU TO NOD OFF OR FALL ASLEEP WHILE LYING DOWN TO REST IN THE AFTERNOON WHEN CIRCUMSTANCES PERMIT: 3
ESS TOTAL SCORE: 15
HOW LIKELY ARE YOU TO NOD OFF OR FALL ASLEEP WHEN YOU ARE A PASSENGER IN A CAR FOR AN HOUR WITHOUT A BREAK: 3
HOW LIKELY ARE YOU TO NOD OFF OR FALL ASLEEP WHILE SITTING INACTIVE IN A PUBLIC PLACE: 3
HOW LIKELY ARE YOU TO NOD OFF OR FALL ASLEEP WHILE SITTING QUIETLY AFTER LUNCH WITHOUT ALCOHOL: 0
HOW LIKELY ARE YOU TO NOD OFF OR FALL ASLEEP WHILE SITTING AND READING: 3
HOW LIKELY ARE YOU TO NOD OFF OR FALL ASLEEP WHILE SITTING AND TALKING TO SOMEONE: 0
HOW LIKELY ARE YOU TO NOD OFF OR FALL ASLEEP WHILE WATCHING TV: 3

## 2019-10-07 RX ORDER — AMLODIPINE BESYLATE 10 MG/1
TABLET ORAL
Qty: 30 TABLET | Refills: 5 | Status: SHIPPED | OUTPATIENT
Start: 2019-10-07 | End: 2020-02-28

## 2019-10-23 ENCOUNTER — OFFICE VISIT (OUTPATIENT)
Dept: FAMILY MEDICINE CLINIC | Age: 54
End: 2019-10-23
Payer: COMMERCIAL

## 2019-10-23 VITALS
BODY MASS INDEX: 45.48 KG/M2 | DIASTOLIC BLOOD PRESSURE: 80 MMHG | OXYGEN SATURATION: 98 % | HEART RATE: 69 BPM | SYSTOLIC BLOOD PRESSURE: 138 MMHG | WEIGHT: 315 LBS

## 2019-10-23 DIAGNOSIS — I10 ESSENTIAL HYPERTENSION, BENIGN: ICD-10-CM

## 2019-10-23 DIAGNOSIS — Z00.00 WELL ADULT EXAM: ICD-10-CM

## 2019-10-23 DIAGNOSIS — Z23 NEED FOR SHINGLES VACCINE: Primary | ICD-10-CM

## 2019-10-23 DIAGNOSIS — E55.9 VITAMIN D DEFICIENCY: ICD-10-CM

## 2019-10-23 DIAGNOSIS — Z99.89 OSA ON CPAP: ICD-10-CM

## 2019-10-23 DIAGNOSIS — Z12.5 PROSTATE CANCER SCREENING: ICD-10-CM

## 2019-10-23 DIAGNOSIS — E66.01 MORBID OBESITY (HCC): ICD-10-CM

## 2019-10-23 DIAGNOSIS — G47.33 OSA ON CPAP: ICD-10-CM

## 2019-10-23 LAB
A/G RATIO: 1.4 (ref 1.1–2.2)
ALBUMIN SERPL-MCNC: 4.5 G/DL (ref 3.4–5)
ALP BLD-CCNC: 111 U/L (ref 40–129)
ALT SERPL-CCNC: 38 U/L (ref 10–40)
ANION GAP SERPL CALCULATED.3IONS-SCNC: 13 MMOL/L (ref 3–16)
AST SERPL-CCNC: 29 U/L (ref 15–37)
BASOPHILS ABSOLUTE: 0 K/UL (ref 0–0.2)
BASOPHILS RELATIVE PERCENT: 0.6 %
BILIRUB SERPL-MCNC: 0.4 MG/DL (ref 0–1)
BUN BLDV-MCNC: 16 MG/DL (ref 7–20)
CALCIUM SERPL-MCNC: 9.6 MG/DL (ref 8.3–10.6)
CHLORIDE BLD-SCNC: 103 MMOL/L (ref 99–110)
CHOLESTEROL, TOTAL: 152 MG/DL (ref 0–199)
CO2: 27 MMOL/L (ref 21–32)
CREAT SERPL-MCNC: 0.8 MG/DL (ref 0.9–1.3)
EOSINOPHILS ABSOLUTE: 0.2 K/UL (ref 0–0.6)
EOSINOPHILS RELATIVE PERCENT: 3.2 %
GFR AFRICAN AMERICAN: >60
GFR NON-AFRICAN AMERICAN: >60
GLOBULIN: 3.2 G/DL
GLUCOSE BLD-MCNC: 92 MG/DL (ref 70–99)
HCT VFR BLD CALC: 37.1 % (ref 40.5–52.5)
HDLC SERPL-MCNC: 60 MG/DL (ref 40–60)
HEMOGLOBIN: 12.3 G/DL (ref 13.5–17.5)
LDL CHOLESTEROL CALCULATED: 74 MG/DL
LYMPHOCYTES ABSOLUTE: 1.4 K/UL (ref 1–5.1)
LYMPHOCYTES RELATIVE PERCENT: 18.8 %
MCH RBC QN AUTO: 33 PG (ref 26–34)
MCHC RBC AUTO-ENTMCNC: 33.2 G/DL (ref 31–36)
MCV RBC AUTO: 99.5 FL (ref 80–100)
MONOCYTES ABSOLUTE: 0.6 K/UL (ref 0–1.3)
MONOCYTES RELATIVE PERCENT: 7.8 %
NEUTROPHILS ABSOLUTE: 5.1 K/UL (ref 1.7–7.7)
NEUTROPHILS RELATIVE PERCENT: 69.6 %
PDW BLD-RTO: 14.6 % (ref 12.4–15.4)
PLATELET # BLD: 271 K/UL (ref 135–450)
PMV BLD AUTO: 9.1 FL (ref 5–10.5)
POTASSIUM SERPL-SCNC: 3.9 MMOL/L (ref 3.5–5.1)
PROSTATE SPECIFIC ANTIGEN: 0.7 NG/ML (ref 0–4)
RBC # BLD: 3.73 M/UL (ref 4.2–5.9)
SODIUM BLD-SCNC: 143 MMOL/L (ref 136–145)
T4 FREE: 1.3 NG/DL (ref 0.9–1.8)
TOTAL PROTEIN: 7.7 G/DL (ref 6.4–8.2)
TRIGL SERPL-MCNC: 90 MG/DL (ref 0–150)
TSH SERPL DL<=0.05 MIU/L-ACNC: 1.39 UIU/ML (ref 0.27–4.2)
VITAMIN D 25-HYDROXY: 13.1 NG/ML
VLDLC SERPL CALC-MCNC: 18 MG/DL
WBC # BLD: 7.3 K/UL (ref 4–11)

## 2019-10-23 PROCEDURE — G8484 FLU IMMUNIZE NO ADMIN: HCPCS | Performed by: FAMILY MEDICINE

## 2019-10-23 PROCEDURE — 99396 PREV VISIT EST AGE 40-64: CPT | Performed by: FAMILY MEDICINE

## 2019-10-23 ASSESSMENT — PATIENT HEALTH QUESTIONNAIRE - PHQ9
1. LITTLE INTEREST OR PLEASURE IN DOING THINGS: 0
SUM OF ALL RESPONSES TO PHQ9 QUESTIONS 1 & 2: 0
2. FEELING DOWN, DEPRESSED OR HOPELESS: 0
SUM OF ALL RESPONSES TO PHQ QUESTIONS 1-9: 0
SUM OF ALL RESPONSES TO PHQ QUESTIONS 1-9: 0

## 2019-10-24 LAB
ESTIMATED AVERAGE GLUCOSE: 82.5 MG/DL
HBA1C MFR BLD: 4.5 %

## 2020-01-08 RX ORDER — LOSARTAN POTASSIUM 100 MG/1
TABLET ORAL
Qty: 90 TABLET | Refills: 0 | Status: SHIPPED | OUTPATIENT
Start: 2020-01-08 | End: 2020-08-10

## 2020-01-08 RX ORDER — LOSARTAN POTASSIUM 100 MG/1
TABLET ORAL
Qty: 90 TABLET | Refills: 0 | Status: SHIPPED | OUTPATIENT
Start: 2020-01-08 | End: 2020-01-08 | Stop reason: SDUPTHER

## 2020-02-28 RX ORDER — AMLODIPINE BESYLATE 10 MG/1
TABLET ORAL
Qty: 90 TABLET | Refills: 0 | Status: SHIPPED | OUTPATIENT
Start: 2020-02-28 | End: 2020-06-16

## 2020-02-28 NOTE — TELEPHONE ENCOUNTER
Last appointment: 10/23/2019  Next appointment:   Future Appointments   Date Time Provider Ambika Singleton   10/15/2020  3:00 PM GREY Donnelly - CNP [facility]

## 2020-06-16 RX ORDER — AMLODIPINE BESYLATE 10 MG/1
TABLET ORAL
Qty: 90 TABLET | Refills: 0 | Status: SHIPPED | OUTPATIENT
Start: 2020-06-16 | End: 2020-12-14

## 2020-10-15 ENCOUNTER — VIRTUAL VISIT (OUTPATIENT)
Dept: PULMONOLOGY | Age: 55
End: 2020-10-15
Payer: COMMERCIAL

## 2020-10-15 PROCEDURE — 99442 PR PHYS/QHP TELEPHONE EVALUATION 11-20 MIN: CPT | Performed by: NURSE PRACTITIONER

## 2020-10-15 ASSESSMENT — SLEEP AND FATIGUE QUESTIONNAIRES
HOW LIKELY ARE YOU TO NOD OFF OR FALL ASLEEP WHILE SITTING AND READING: 1
HOW LIKELY ARE YOU TO NOD OFF OR FALL ASLEEP WHILE SITTING AND TALKING TO SOMEONE: 0
HOW LIKELY ARE YOU TO NOD OFF OR FALL ASLEEP WHILE SITTING INACTIVE IN A PUBLIC PLACE: 2
ESS TOTAL SCORE: 10
HOW LIKELY ARE YOU TO NOD OFF OR FALL ASLEEP IN A CAR, WHILE STOPPED FOR A FEW MINUTES IN TRAFFIC: 0
HOW LIKELY ARE YOU TO NOD OFF OR FALL ASLEEP WHILE WATCHING TV: 1
HOW LIKELY ARE YOU TO NOD OFF OR FALL ASLEEP WHILE SITTING QUIETLY AFTER LUNCH WITHOUT ALCOHOL: 0
HOW LIKELY ARE YOU TO NOD OFF OR FALL ASLEEP WHEN YOU ARE A PASSENGER IN A CAR FOR AN HOUR WITHOUT A BREAK: 3
HOW LIKELY ARE YOU TO NOD OFF OR FALL ASLEEP WHILE LYING DOWN TO REST IN THE AFTERNOON WHEN CIRCUMSTANCES PERMIT: 3

## 2020-10-15 NOTE — ASSESSMENT & PLAN NOTE
Reviewed compliance download with pt. Supplies and parts as needed for his machine. These are medically necessary. Continue medications per his PCP and other physicians. Limit caffeine use after 3pm.  Encouraged him to work on weight loss through diet and exercise. Diagnoses of Primary cardiomyopathy ([address] Morbid obesity ([address] Gastroesophageal reflux disease without esophagitis, and Essential hypertension, benign were pertinent to this visit. The chronic medical conditions listed are directly related to the primary diagnosis listed above. The management of the primary diagnosis affects the secondary diagnosis and vice versa.

## 2020-10-15 NOTE — PROGRESS NOTES
Akosua Partida         : 1965    Diagnosis: [x] DANIELLE (G47.33) [] CSA (G47.31) [] Apnea (G47.30)   Length of Need: [x] 12 Months [] 99 Months [] Other:    Machine (MORA!): [x] Respironics Dream Station      Auto [] ResMed AirSense     Auto [] Other:     []  CPAP () [] Bilevel ()   Mode: [] Auto [] Spontaneous    Mode: [] Auto [] Spontaneous                            Comfort Settings:   - Ramp Pressure:  cmH2O                                        - Ramp time: 15 min                                     -  Flex/EPR - 3 full time                                    - For ResMed Bilevel (TiMax-4 sec   TiMin- 0.2 sec)     Humidifier: [x] Heated ()        [x] Water chamber replacement ()/ 1 per 6 months        Mask:   [x] Nasal () /1 per 3 months [] Full Face () /1 per 3 months   [x] Patient choice -Size and fit mask [] Patient Choice - Size and fit mask   [] Dispense:  [] Dispense:    [x] Headgear () / 1 per 3 months [] Headgear () / 1 per 3 months   [x] Replacement Nasal Cushion ()/2 per month [] Interface Replacement ()/1 per month   [] Replacement Nasal Pillows ()/2 per month         Tubing: [x] Heated ()/1 per 3 months    [] Standard ()/1 per 3 months [] Other:           Filters: [x] Non-disposable ()/1 per 6 months     [x] Ultra-Fine, Disposable ()/2 per month        Miscellaneous: [] Chin Strap ()/ 1 per 6 months [] O2 bleed-in:       LPM   [] Oximetry on CPAP/Bilevel []  Other:    [x] Modem: ()         Start Order Date: 10/15/20    MEDICAL JUSTIFICATION:  I, the undersigned, certify that the above prescribed supplies are medically necessary for this patients wellbeing. In my opinion, the supplies are both reasonable and necessary in reference to accepted standards of medicalpractice in treatment of this patients condition.     GREY Villarreal - CROW      NPI: [id number]       Order Signed Date: 10/15/20    Electronically signed by GREY Kaba CNP on 10/15/2020 at 2:53 PM

## 2020-10-15 NOTE — PROGRESS NOTES
Margaret Marquez MD, FAASM, [facility]  Kimberli Cruz, MSN, RN, CNP     [facility] SLEEP MEDICINE  [place] [address] [address] [facility] [address]  Dept: [phone]  Dept Fax: [phone]: [address] [facility] SLEEP MEDICINE  [address] [place] [id number] [phone]    Subjective:     Patient ID: Krysta Nguyễn is a 63 y.o. male. Chief Complaint   Patient presents with    Sleep Apnea       HPI:      Sleep Medicine Telephone Visit    Pursuant to the emergency declaration under the [facility], [facility] waiver authority and the [employer] Act this Telephone Visit was insisted, with patient's consent, to reduce the patient's risk of exposure to COVID-19 and provide continuity of care for an established patient. Services were provided through a synchronous discussion over a telephone and/or Video chat to substitute for in-person clinic visit, and coded as such. While patient is at home. Machine Modem/Download Info:  Compliance (hours/night): 7.4 hrs/night  Download AHI (/hour): 1.2 /HR  Average CPAP Pressure : 13.4 cmH2O           APAP - Settings  Pressure Min: 12 cmH2O  Pressure Max: 20 cmH2O                   PAP Mask  Mask Type: Nasal mask     [place] - Total score: 10    Follow-up :     Last Visit : October 2019      Patient reports the listed chronic Co-morbidities: Cardiomyopathy, Obesity, GERD, HTN   are well controlled and stable at this time.      Subjective Health Changes: None      Over Night Oximetry: [] Yes  [] No  [x] NA [] WNL   Using O2: [] Yes  [] No  [x] NA   Patient is compliant with the machine  [x] Yes  [] No   Feeling rested when using the machine   [x] Yes  [] No     Pressure is comfortable with inspiration and expiration  [x] Yes  [] No     Noticed changes in pressure   [] Yes  [] No  [x] NA   Mask is Continue medications per his PCP and other physicians. Limit caffeine use after 3pm.  Encouraged him to work on weight loss through diet and exercise. Diagnoses of Primary cardiomyopathy ([address] Morbid obesity ([address] Gastroesophageal reflux disease without esophagitis, and Essential hypertension, benign were pertinent to this visit. The chronic medical conditions listed are directly related to the primary diagnosis listed above. The management of the primary diagnosis affects the secondary diagnosis and vice versa. The primary encounter diagnosis was DANIELLE on CPAP. Diagnoses of Primary cardiomyopathy ([address] Morbid obesity ([address] Gastroesophageal reflux disease without esophagitis, and Essential hypertension, benign were also pertinent to this visit. The chronic medical conditions listed are directly related to the primary diagnosis listed above. The management of the primary diagnosis affects the secondary diagnosis and vice versa. - Educated patient and reviewed compliance download with pt.    -Supplies and parts as needed for his machine, these are medically necessary.    - Patient using [employer] for supplies  -Continue medications per his PCP and other physicians.   -Limit caffeine use after 3pm.    -Encouraged him to work on weight loss through diet and exercise. -  Patient not able to access video feed. Visit completed via video chat communications. 20 min spent with patient.   - Education on follow up, temperature, and humidifier   -F/U: 12 month. No orders of the defined types were placed in this encounter. No orders of the defined types were placed in this encounter. No orders of the defined types were placed in this encounter.       Emily Mckee, MATEUS, RN, CNP

## 2020-12-14 RX ORDER — AMLODIPINE BESYLATE 10 MG/1
TABLET ORAL
Qty: 90 TABLET | Refills: 3 | Status: SHIPPED | OUTPATIENT
Start: 2020-12-14 | End: 2021-03-09 | Stop reason: SDUPTHER

## 2021-03-09 RX ORDER — CARVEDILOL 6.25 MG/1
TABLET ORAL
Qty: 360 TABLET | Refills: 3 | Status: SHIPPED | OUTPATIENT
Start: 2021-03-09 | End: 2022-03-29

## 2021-03-09 RX ORDER — AMLODIPINE BESYLATE 10 MG/1
TABLET ORAL
Qty: 90 TABLET | Refills: 3 | Status: SHIPPED | OUTPATIENT
Start: 2021-03-09 | End: 2022-03-29

## 2021-03-09 RX ORDER — LOSARTAN POTASSIUM 100 MG/1
TABLET ORAL
Qty: 90 TABLET | Refills: 3 | Status: SHIPPED | OUTPATIENT
Start: 2021-03-09 | End: 2022-03-29

## 2021-03-18 ENCOUNTER — IMMUNIZATION (OUTPATIENT)
Dept: PRIMARY CARE CLINIC | Age: 56
End: 2021-03-18
Payer: COMMERCIAL

## 2021-03-18 PROCEDURE — 91301 COVID-19, MODERNA VACCINE 100MCG/0.5ML DOSE: CPT | Performed by: FAMILY MEDICINE

## 2021-03-18 PROCEDURE — 0011A COVID-19, MODERNA VACCINE 100MCG/0.5ML DOSE: CPT | Performed by: FAMILY MEDICINE

## 2021-04-15 ENCOUNTER — IMMUNIZATION (OUTPATIENT)
Dept: PRIMARY CARE CLINIC | Age: 56
End: 2021-04-15
Payer: COMMERCIAL

## 2021-04-15 PROCEDURE — 0012A COVID-19, MODERNA VACCINE 100MCG/0.5ML DOSE: CPT | Performed by: FAMILY MEDICINE

## 2021-04-15 PROCEDURE — 91301 COVID-19, MODERNA VACCINE 100MCG/0.5ML DOSE: CPT | Performed by: FAMILY MEDICINE

## 2021-10-21 ENCOUNTER — TELEPHONE (OUTPATIENT)
Dept: PULMONOLOGY | Age: 56
End: 2021-10-21

## 2021-10-21 NOTE — TELEPHONE ENCOUNTER
Attempted to reach the patient for their appointment.      Unable to leave message on the patients machine to complete or reschedule their appointment    Link sent also

## 2021-12-14 ENCOUNTER — OFFICE VISIT (OUTPATIENT)
Dept: FAMILY MEDICINE CLINIC | Age: 56
End: 2021-12-14
Payer: COMMERCIAL

## 2021-12-14 VITALS
BODY MASS INDEX: 40.43 KG/M2 | SYSTOLIC BLOOD PRESSURE: 136 MMHG | HEIGHT: 74 IN | WEIGHT: 315 LBS | HEART RATE: 93 BPM | OXYGEN SATURATION: 97 % | DIASTOLIC BLOOD PRESSURE: 88 MMHG

## 2021-12-14 DIAGNOSIS — Z01.818 PRE-OP EXAM: Primary | ICD-10-CM

## 2021-12-14 DIAGNOSIS — Z23 NEED FOR TDAP VACCINATION: ICD-10-CM

## 2021-12-14 DIAGNOSIS — Z99.89 OSA ON CPAP: ICD-10-CM

## 2021-12-14 DIAGNOSIS — Z23 NEED FOR INFLUENZA VACCINATION: ICD-10-CM

## 2021-12-14 DIAGNOSIS — I10 ESSENTIAL HYPERTENSION, BENIGN: ICD-10-CM

## 2021-12-14 DIAGNOSIS — M17.12 PRIMARY OSTEOARTHRITIS OF LEFT KNEE: ICD-10-CM

## 2021-12-14 DIAGNOSIS — G47.33 OSA ON CPAP: ICD-10-CM

## 2021-12-14 DIAGNOSIS — Z01.818 PRE-OP EXAM: ICD-10-CM

## 2021-12-14 LAB
ANION GAP SERPL CALCULATED.3IONS-SCNC: 13 MMOL/L (ref 3–16)
BASOPHILS ABSOLUTE: 0.1 K/UL (ref 0–0.2)
BASOPHILS RELATIVE PERCENT: 0.7 %
BILIRUBIN URINE: NEGATIVE
BLOOD, URINE: ABNORMAL
BUN BLDV-MCNC: 16 MG/DL (ref 7–20)
CALCIUM SERPL-MCNC: 9.5 MG/DL (ref 8.3–10.6)
CHLORIDE BLD-SCNC: 103 MMOL/L (ref 99–110)
CLARITY: CLEAR
CO2: 26 MMOL/L (ref 21–32)
COLOR: YELLOW
COMMENT UA: NORMAL
CREAT SERPL-MCNC: 0.8 MG/DL (ref 0.9–1.3)
EOSINOPHILS ABSOLUTE: 0.3 K/UL (ref 0–0.6)
EOSINOPHILS RELATIVE PERCENT: 4.5 %
EPITHELIAL CELLS, UA: 3 /HPF (ref 0–5)
GFR AFRICAN AMERICAN: >60
GFR NON-AFRICAN AMERICAN: >60
GLUCOSE BLD-MCNC: 89 MG/DL (ref 70–99)
GLUCOSE URINE: NEGATIVE MG/DL
HCT VFR BLD CALC: 37.9 % (ref 40.5–52.5)
HEMOGLOBIN: 12.8 G/DL (ref 13.5–17.5)
HYALINE CASTS: 0 /LPF (ref 0–8)
KETONES, URINE: NEGATIVE MG/DL
LEUKOCYTE ESTERASE, URINE: NEGATIVE
LYMPHOCYTES ABSOLUTE: 1.5 K/UL (ref 1–5.1)
LYMPHOCYTES RELATIVE PERCENT: 19.3 %
MCH RBC QN AUTO: 33.8 PG (ref 26–34)
MCHC RBC AUTO-ENTMCNC: 33.9 G/DL (ref 31–36)
MCV RBC AUTO: 99.7 FL (ref 80–100)
MICROSCOPIC EXAMINATION: YES
MONOCYTES ABSOLUTE: 0.6 K/UL (ref 0–1.3)
MONOCYTES RELATIVE PERCENT: 8.3 %
NEUTROPHILS ABSOLUTE: 5.1 K/UL (ref 1.7–7.7)
NEUTROPHILS RELATIVE PERCENT: 67.2 %
NITRITE, URINE: NEGATIVE
PDW BLD-RTO: 14.1 % (ref 12.4–15.4)
PH UA: 7 (ref 5–8)
PLATELET # BLD: 299 K/UL (ref 135–450)
PMV BLD AUTO: 8.6 FL (ref 5–10.5)
POTASSIUM SERPL-SCNC: 4 MMOL/L (ref 3.5–5.1)
PROTEIN UA: NEGATIVE MG/DL
RBC # BLD: 3.8 M/UL (ref 4.2–5.9)
RBC UA: 4 /HPF (ref 0–4)
SODIUM BLD-SCNC: 142 MMOL/L (ref 136–145)
SPECIFIC GRAVITY UA: 1.02 (ref 1–1.03)
URINE REFLEX TO CULTURE: ABNORMAL
URINE TYPE: ABNORMAL
UROBILINOGEN, URINE: 0.2 E.U./DL
WBC # BLD: 7.6 K/UL (ref 4–11)
WBC UA: 3 /HPF (ref 0–5)

## 2021-12-14 PROCEDURE — 3017F COLORECTAL CA SCREEN DOC REV: CPT | Performed by: FAMILY MEDICINE

## 2021-12-14 PROCEDURE — G8482 FLU IMMUNIZE ORDER/ADMIN: HCPCS | Performed by: FAMILY MEDICINE

## 2021-12-14 PROCEDURE — G8427 DOCREV CUR MEDS BY ELIG CLIN: HCPCS | Performed by: FAMILY MEDICINE

## 2021-12-14 PROCEDURE — G8417 CALC BMI ABV UP PARAM F/U: HCPCS | Performed by: FAMILY MEDICINE

## 2021-12-14 PROCEDURE — 90715 TDAP VACCINE 7 YRS/> IM: CPT | Performed by: FAMILY MEDICINE

## 2021-12-14 PROCEDURE — 90472 IMMUNIZATION ADMIN EACH ADD: CPT | Performed by: FAMILY MEDICINE

## 2021-12-14 PROCEDURE — 1036F TOBACCO NON-USER: CPT | Performed by: FAMILY MEDICINE

## 2021-12-14 PROCEDURE — 90674 CCIIV4 VAC NO PRSV 0.5 ML IM: CPT | Performed by: FAMILY MEDICINE

## 2021-12-14 PROCEDURE — 90471 IMMUNIZATION ADMIN: CPT | Performed by: FAMILY MEDICINE

## 2021-12-14 PROCEDURE — 93000 ELECTROCARDIOGRAM COMPLETE: CPT | Performed by: FAMILY MEDICINE

## 2021-12-14 PROCEDURE — 99214 OFFICE O/P EST MOD 30 MIN: CPT | Performed by: FAMILY MEDICINE

## 2021-12-14 SDOH — ECONOMIC STABILITY: FOOD INSECURITY: WITHIN THE PAST 12 MONTHS, THE FOOD YOU BOUGHT JUST DIDN'T LAST AND YOU DIDN'T HAVE MONEY TO GET MORE.: NEVER TRUE

## 2021-12-14 SDOH — ECONOMIC STABILITY: FOOD INSECURITY: WITHIN THE PAST 12 MONTHS, YOU WORRIED THAT YOUR FOOD WOULD RUN OUT BEFORE YOU GOT MONEY TO BUY MORE.: NEVER TRUE

## 2021-12-14 ASSESSMENT — PATIENT HEALTH QUESTIONNAIRE - PHQ9
SUM OF ALL RESPONSES TO PHQ QUESTIONS 1-9: 0
SUM OF ALL RESPONSES TO PHQ9 QUESTIONS 1 & 2: 0
SUM OF ALL RESPONSES TO PHQ QUESTIONS 1-9: 0
1. LITTLE INTEREST OR PLEASURE IN DOING THINGS: 0
2. FEELING DOWN, DEPRESSED OR HOPELESS: 0
SUM OF ALL RESPONSES TO PHQ QUESTIONS 1-9: 0

## 2021-12-14 ASSESSMENT — SOCIAL DETERMINANTS OF HEALTH (SDOH): HOW HARD IS IT FOR YOU TO PAY FOR THE VERY BASICS LIKE FOOD, HOUSING, MEDICAL CARE, AND HEATING?: NOT HARD AT ALL

## 2021-12-14 NOTE — PROGRESS NOTES
Substance and Sexual Activity    Alcohol use: Yes     Alcohol/week: 1.0 standard drink     Types: 1 Glasses of wine per week     Comment: occasional    Drug use: No    Sexual activity: Yes     Partners: Female   Other Topics Concern    None   Social History Narrative    Exercise--no    + seatbelts    Happily --mom lives with them--2 kids and 1 kid with [employer]    Works>50--;lots of stress but good support     Social Determinants of Health     Financial Resource Strain: Low Risk     Difficulty of Paying Living Expenses: Not hard at all   Food Insecurity: No Food Insecurity    Worried About Running Out of Food in the Last Year: Never true    [address] in the Last Year: Never true   Transportation Needs:     Lack of Transportation (Medical): Not on file    Lack of Transportation (Non-Medical):  Not on file   Physical Activity:     Days of Exercise per Week: Not on file    Minutes of Exercise per Session: Not on file   Stress:     Feeling of Stress : Not on file   Social Connections:     Frequency of Communication with Friends and Family: Not on file    Frequency of Social Gatherings with Friends and Family: Not on file    Attends [employer] Services: Not on file    Active Member of [address] [place] [address] [employer] or Organizations: Not on file    Attends Club or Organization Meetings: Not on file    Marital Status: Not on file   Intimate Partner Violence:     Fear of Current or Ex-Partner: Not on file    Emotionally Abused: Not on file    Physically Abused: Not on file    Sexually Abused: Not on file   Housing Stability:     Unable to Pay for Housing in the Last Year: Not on file    Number of [place] in the Last Year: Not on file    Unstable Housing in the Last Year: Not on file     Current Outpatient Medications   Medication Sig Dispense Refill    losartan (COZAAR) 100 MG tablet Take 1 tablet by mouth once daily 90 tablet 3    carvedilol (COREG) 6.25 MG tablet TAKE 2 TABLETS BY MOUTH TWICE DAILY WITH MEALS 360 tablet 3    amLODIPine (NORVASC) 10 MG tablet Take 1 tablet by mouth once daily 90 tablet 3    aspirin 81 MG tablet Take 1 tablet by mouth daily 30 tablet 3    sildenafil (VIAGRA) 100 MG tablet Take 1 tablet by mouth daily as needed for Erectile Dysfunction 6 tablet 4    Multiple Vitamins-Minerals (THERAPEUTIC MULTIVITAMIN-MINERALS) tablet Take 1 tablet by mouth daily       No current facility-administered medications for this visit. Current Outpatient Medications on File Prior to Visit   Medication Sig Dispense Refill    losartan (COZAAR) 100 MG tablet Take 1 tablet by mouth once daily 90 tablet 3    carvedilol (COREG) 6.25 MG tablet TAKE 2 TABLETS BY MOUTH TWICE DAILY WITH MEALS 360 tablet 3    amLODIPine (NORVASC) 10 MG tablet Take 1 tablet by mouth once daily 90 tablet 3    aspirin 81 MG tablet Take 1 tablet by mouth daily 30 tablet 3    sildenafil (VIAGRA) 100 MG tablet Take 1 tablet by mouth daily as needed for Erectile Dysfunction 6 tablet 4    Multiple Vitamins-Minerals (THERAPEUTIC MULTIVITAMIN-MINERALS) tablet Take 1 tablet by mouth daily       No current facility-administered medications on file prior to visit.      No Known Allergies  Review of Systems  Constitutional: negative  Eyes: negative  Ears, nose, mouth, throat, and face: negative  Respiratory: negative  Cardiovascular: negative  Gastrointestinal: negative  Genitourinary:negative  Integument/breast: negative  Hematologic/lymphatic: negative  Musculoskeletal:positive for arthralgias  Neurological: negative  Behavioral/Psych: negative  Endocrine: negative  Allergic/Immunologic: negative      Objective:      /88   Pulse 93   Ht 6' 2\" (1.88 m)   Wt (!) 368 lb 12.8 oz (167.3 kg)   SpO2 97%   BMI 47.35 kg/m²     General Appearance:  Alert, cooperative, no distress, appears stated age   Head:  Normocephalic, without obvious abnormality, atraumatic   Eyes:  PERRL, conjunctiva/corneas clear, EOM's intact, fundi benign, both eyes   Ears:  Normal TM's and external ear canals, both ears   Nose: Nares normal, septum midline, mucosa normal, no drainage or sinus tenderness   Throat: Lips, mucosa, and tongue normal; teeth and gums normal   Neck: Supple, symmetrical, trachea midline, no adenopathy, thyroid: not enlarged, symmetric, no tenderness/mass/nodules, no carotid bruit or JVD   Back:   Symmetric, no curvature, ROM normal, no CVA tenderness   Lungs:   Clear to auscultation bilaterally, respirations unlabored   Chest Wall:  No tenderness or deformity   Heart:  Regular rate and rhythm, S1, S2 normal, no murmur, rub or gallop   Abdomen:   Soft, non-tender, bowel sounds active all four quadrants,  no masses, no organomegaly   Genitalia:  Normal male   Rectal:  Normal tone, normal prostate, no masses or tenderness;  guaiac negative stool   Extremities: Extremities normal, atraumatic, no cyanosis or edema   Pulses: 2+ and symmetric   Skin: Skin color, texture, turgor normal, no rashes or lesions   Lymph nodes: Cervical, supraclavicular, and axillary nodes normal   Neurologic: Normal         Predictors of intubation difficulty:   Morbid obesity? no   Anatomically abnormal facies? no   Prominent incisors? no   Receding mandible? no   Short, thick neck? no   Neck range of motion: normal   Mallampati score: I (soft palate, uvula, fauces, tonsillar pillars visible)   Thyromental distance: < 6cm   Mouth openincm   Dentition: No chipped, loose, or missing teeth. Cardiographics  ECG: normal sinus rhythm, no blocks or conduction defects, no ischemic changes          Lab Review   No visits with results within 2 Month(s) from this visit.    Latest known visit with results is:   Orders Only on 10/23/2019   Component Date Value    PSA 10/23/2019 0.70     Hemoglobin A1C 10/23/2019 4.5     eAG 10/23/2019 82.5     Cholesterol, Total 10/23/2019 152     Triglycerides 10/23/2019 90     HDL 10/23/2019 60     LDL Calculated 10/23/2019 74     VLDL Cholesterol Calcula* 10/23/2019 18     Sodium 10/23/2019 143     Potassium 10/23/2019 3.9     Chloride 10/23/2019 103     CO2 10/23/2019 27     Anion Gap 10/23/2019 13     Glucose 10/23/2019 92     BUN 10/23/2019 16     CREATININE 10/23/2019 0.8*    GFR Non- 10/23/2019 >60     GFR  10/23/2019 >60     Calcium 10/23/2019 9.6     Total Protein 10/23/2019 7.7     Albumin 10/23/2019 4.5     Albumin/Globulin Ratio 10/23/2019 1.4     Total Bilirubin 10/23/2019 0.4     Alkaline Phosphatase 10/23/2019 111     ALT 10/23/2019 38     AST 10/23/2019 29     Globulin 10/23/2019 3.2     T4 Free 10/23/2019 1.3     TSH 10/23/2019 1.39     Vit D, 25-Hydroxy 10/23/2019 13.1*    WBC 10/23/2019 7.3     RBC 10/23/2019 3.73*    Hemoglobin 10/23/2019 12.3*    Hematocrit 10/23/2019 37.1*    MCV 10/23/2019 99.5     MCH 10/23/2019 33.0     MCHC 10/23/2019 33.2     RDW 10/23/2019 14.6     Platelets 63/63/1846 271     MPV 10/23/2019 9.1     Neutrophils % 10/23/2019 69.6     Lymphocytes % 10/23/2019 18.8     Monocytes % 10/23/2019 7.8     Eosinophils % 10/23/2019 3.2     Basophils % 10/23/2019 0.6     Neutrophils Absolute 10/23/2019 5.1     Lymphocytes Absolute 10/23/2019 1.4     Monocytes Absolute 10/23/2019 0.6     Eosinophils Absolute 10/23/2019 0.2     Basophils Absolute 10/23/2019 0.0          Assessment:        61 y.o. male with planned surgery as above--OKd for planned procedure  Known risk factors for perioperative complications: None    Difficulty with intubation is not anticipated. Cardiac Risk Estimation: low risk    Current medications which may produce withdrawal symptoms if withheld perioperatively: none       Plan:      1. Preoperative workup as follows none  2. Change in medication regimen before surgery: none, continue med regimen including morning of surgery, w/sip of water  3.  Prophylaxis for cardiac events with perioperative beta-blockers: not indicated  4. Invasive hemodynamic monitoring perioperatively: not indicated  5.  Deep vein thrombosis prophylaxis postoperatively:regimen to be chosen by surgical team

## 2022-01-13 PROBLEM — Z01.818 PRE-OP EXAM: Status: RESOLVED | Noted: 2021-12-14 | Resolved: 2022-01-13

## 2022-03-15 ENCOUNTER — OFFICE VISIT (OUTPATIENT)
Dept: FAMILY MEDICINE CLINIC | Age: 57
End: 2022-03-15
Payer: COMMERCIAL

## 2022-03-15 VITALS
OXYGEN SATURATION: 96 % | SYSTOLIC BLOOD PRESSURE: 138 MMHG | WEIGHT: 315 LBS | HEART RATE: 103 BPM | DIASTOLIC BLOOD PRESSURE: 80 MMHG | BODY MASS INDEX: 40.43 KG/M2 | HEIGHT: 74 IN

## 2022-03-15 DIAGNOSIS — G47.33 OSA ON CPAP: ICD-10-CM

## 2022-03-15 DIAGNOSIS — Z01.818 PRE-OP EXAM: Primary | ICD-10-CM

## 2022-03-15 DIAGNOSIS — I10 ESSENTIAL HYPERTENSION, BENIGN: ICD-10-CM

## 2022-03-15 DIAGNOSIS — Z01.818 PREOP EXAMINATION: ICD-10-CM

## 2022-03-15 DIAGNOSIS — Z01.818 PRE-OP EXAM: ICD-10-CM

## 2022-03-15 DIAGNOSIS — M17.11 PRIMARY LOCALIZED OSTEOARTHRITIS OF RIGHT KNEE: ICD-10-CM

## 2022-03-15 DIAGNOSIS — Z96.652 STATUS POST TOTAL LEFT KNEE REPLACEMENT: ICD-10-CM

## 2022-03-15 DIAGNOSIS — Z99.89 OSA ON CPAP: ICD-10-CM

## 2022-03-15 PROBLEM — G89.29 CHRONIC PAIN OF LEFT KNEE: Status: RESOLVED | Noted: 2017-04-11 | Resolved: 2022-03-15

## 2022-03-15 PROBLEM — M25.562 CHRONIC PAIN OF LEFT KNEE: Status: RESOLVED | Noted: 2017-04-11 | Resolved: 2022-03-15

## 2022-03-15 LAB
A/G RATIO: 1.3 (ref 1.1–2.2)
ALBUMIN SERPL-MCNC: 4.1 G/DL (ref 3.4–5)
ALP BLD-CCNC: 132 U/L (ref 40–129)
ALT SERPL-CCNC: 23 U/L (ref 10–40)
ANION GAP SERPL CALCULATED.3IONS-SCNC: 12 MMOL/L (ref 3–16)
AST SERPL-CCNC: 20 U/L (ref 15–37)
BASOPHILS ABSOLUTE: 0 K/UL (ref 0–0.2)
BASOPHILS RELATIVE PERCENT: 0.7 %
BILIRUB SERPL-MCNC: 0.4 MG/DL (ref 0–1)
BUN BLDV-MCNC: 14 MG/DL (ref 7–20)
CALCIUM SERPL-MCNC: 9.4 MG/DL (ref 8.3–10.6)
CHLORIDE BLD-SCNC: 103 MMOL/L (ref 99–110)
CO2: 27 MMOL/L (ref 21–32)
CREAT SERPL-MCNC: 0.9 MG/DL (ref 0.9–1.3)
EOSINOPHILS ABSOLUTE: 0.2 K/UL (ref 0–0.6)
EOSINOPHILS RELATIVE PERCENT: 2.8 %
GFR AFRICAN AMERICAN: >60
GFR NON-AFRICAN AMERICAN: >60
GLUCOSE BLD-MCNC: 101 MG/DL (ref 70–99)
HCT VFR BLD CALC: 36.7 % (ref 40.5–52.5)
HEMOGLOBIN: 12.3 G/DL (ref 13.5–17.5)
LYMPHOCYTES ABSOLUTE: 1.2 K/UL (ref 1–5.1)
LYMPHOCYTES RELATIVE PERCENT: 16.3 %
MCH RBC QN AUTO: 33.1 PG (ref 26–34)
MCHC RBC AUTO-ENTMCNC: 33.6 G/DL (ref 31–36)
MCV RBC AUTO: 98.6 FL (ref 80–100)
MONOCYTES ABSOLUTE: 0.6 K/UL (ref 0–1.3)
MONOCYTES RELATIVE PERCENT: 8.6 %
NEUTROPHILS ABSOLUTE: 5.2 K/UL (ref 1.7–7.7)
NEUTROPHILS RELATIVE PERCENT: 71.6 %
PDW BLD-RTO: 14.3 % (ref 12.4–15.4)
PLATELET # BLD: 278 K/UL (ref 135–450)
PMV BLD AUTO: 8.2 FL (ref 5–10.5)
POTASSIUM SERPL-SCNC: 3.5 MMOL/L (ref 3.5–5.1)
RBC # BLD: 3.72 M/UL (ref 4.2–5.9)
SODIUM BLD-SCNC: 142 MMOL/L (ref 136–145)
TOTAL PROTEIN: 7.3 G/DL (ref 6.4–8.2)
WBC # BLD: 7.3 K/UL (ref 4–11)

## 2022-03-15 PROCEDURE — G8482 FLU IMMUNIZE ORDER/ADMIN: HCPCS | Performed by: FAMILY MEDICINE

## 2022-03-15 PROCEDURE — 81003 URINALYSIS AUTO W/O SCOPE: CPT | Performed by: FAMILY MEDICINE

## 2022-03-15 PROCEDURE — 3017F COLORECTAL CA SCREEN DOC REV: CPT | Performed by: FAMILY MEDICINE

## 2022-03-15 PROCEDURE — 93000 ELECTROCARDIOGRAM COMPLETE: CPT | Performed by: FAMILY MEDICINE

## 2022-03-15 PROCEDURE — 1036F TOBACCO NON-USER: CPT | Performed by: FAMILY MEDICINE

## 2022-03-15 PROCEDURE — G8427 DOCREV CUR MEDS BY ELIG CLIN: HCPCS | Performed by: FAMILY MEDICINE

## 2022-03-15 PROCEDURE — G8417 CALC BMI ABV UP PARAM F/U: HCPCS | Performed by: FAMILY MEDICINE

## 2022-03-15 PROCEDURE — 99214 OFFICE O/P EST MOD 30 MIN: CPT | Performed by: FAMILY MEDICINE

## 2022-03-15 ASSESSMENT — PATIENT HEALTH QUESTIONNAIRE - PHQ9
1. LITTLE INTEREST OR PLEASURE IN DOING THINGS: 0
2. FEELING DOWN, DEPRESSED OR HOPELESS: 0
SUM OF ALL RESPONSES TO PHQ QUESTIONS 1-9: 0
SUM OF ALL RESPONSES TO PHQ9 QUESTIONS 1 & 2: 0

## 2022-03-15 NOTE — PROGRESS NOTES
Subjective:      Graham Nur is a 61 y.o. male who presents to the office today for a preoperative consultation at the request of surgeon Dr Zane Robertson who plans on performing R TKR(severe OA) on April 4. This consultation is requested for the specific conditions prompting preoperative evaluation (i.e. because of potential affect on operative risk): Htn. Planned anesthesia is Epidural.  The patient and family have no known anesthesia issues nor bleeding risks.  .  Past Medical History:   Diagnosis Date    [facility] 1/11    [facility]--neg labs and stress echo    Essential hypertension, benign     H/O: Bell's palsy 06    occ has sx    Morbid obesity ([address]     DANIELLE on CPAP     Unspecified sleep apnea      Patient Active Problem List    Diagnosis Date Noted    Status post total left knee replacement 03/15/2022    Primary localized osteoarthritis of right knee 03/15/2022    Pre-op exam 12/14/2021    Osteoarthritis of left knee 08/04/2017    GERD (gastroesophageal reflux disease) 08/17/2012    H/O: Bell's palsy 01/12/2011    Low back pain 01/12/2011    Essential hypertension, benign 01/12/2011    Primary cardiomyopathy ([address]     Arthropathy     Morbid obesity ([address]     DANIELLE on CPAP      Past Surgical History:   Procedure Laterality Date    COLONOSCOPY  02/2017    q 10    GASTRIC BAND  2008    KNEE SURGERY Bilateral 1992 and  2005     TOTAL KNEE ARTHROPLASTY Left 01/2022    True     Family History   Problem Relation Age of Onset    Arthritis Mother     Hearing Loss Mother     High Blood Pressure Mother     Cancer Mother 80        uterine    Hearing Loss Father     Heart Disease Father     High Blood Pressure Father     Stroke Father     Cancer Father 80        pancreas     Social History     Socioeconomic History    Marital status:      Spouse name: None    Number of children: 2    Years of education: None    Highest education level: None   Occupational History    Occupation:  at [address] Use    Smoking status: Never Smoker    Smokeless tobacco: Never Used   Vaping Use    Vaping Use: Never used   Substance and Sexual Activity    Alcohol use: Yes     Alcohol/week: 1.0 standard drink     Types: 1 Glasses of wine per week     Comment: occasional    Drug use: No    Sexual activity: Yes     Partners: Female   Other Topics Concern    None   Social History Narrative    Exercise--no    + seatbelts    Happily --mom lives with them--2 kids and 1 kid with [employer]    Works>50--;lots of stress but good support     Social Determinants of Health     Financial Resource Strain: Low Risk     Difficulty of Paying Living Expenses: Not hard at all   Food Insecurity: No Food Insecurity    Worried About Running Out of Food in the Last Year: Never true    [address] in the Last Year: Never true   Transportation Needs:     Lack of Transportation (Medical): Not on file    Lack of Transportation (Non-Medical):  Not on file   Physical Activity:     Days of Exercise per Week: Not on file    Minutes of Exercise per Session: Not on file   Stress:     Feeling of Stress : Not on file   Social Connections:     Frequency of Communication with Friends and Family: Not on file    Frequency of Social Gatherings with Friends and Family: Not on file    Attends Islam Services: Not on file    Active Member of [address] [place] [address] or Organizations: Not on file    Attends Club or Organization Meetings: Not on file    Marital Status: Not on file   Intimate Partner Violence:     Fear of Current or Ex-Partner: Not on file    Emotionally Abused: Not on file    Physically Abused: Not on file    Sexually Abused: Not on file   Housing Stability:     Unable to Pay for Housing in the Last Year: Not on file    Number of [place] in the Last Year: Not on file    Unstable Housing in the Last Year: Not on file     Current Outpatient Medications   Medication Sig Dispense Refill    losartan (COZAAR) 100 MG tablet Take 1 tablet by mouth once daily 90 tablet 3    carvedilol (COREG) 6.25 MG tablet TAKE 2 TABLETS BY MOUTH TWICE DAILY WITH MEALS 360 tablet 3    amLODIPine (NORVASC) 10 MG tablet Take 1 tablet by mouth once daily 90 tablet 3    sildenafil (VIAGRA) 100 MG tablet Take 1 tablet by mouth daily as needed for Erectile Dysfunction 6 tablet 4    Multiple Vitamins-Minerals (THERAPEUTIC MULTIVITAMIN-MINERALS) tablet Take 1 tablet by mouth daily       No current facility-administered medications for this visit. Current Outpatient Medications on File Prior to Visit   Medication Sig Dispense Refill    losartan (COZAAR) 100 MG tablet Take 1 tablet by mouth once daily 90 tablet 3    carvedilol (COREG) 6.25 MG tablet TAKE 2 TABLETS BY MOUTH TWICE DAILY WITH MEALS 360 tablet 3    amLODIPine (NORVASC) 10 MG tablet Take 1 tablet by mouth once daily 90 tablet 3    sildenafil (VIAGRA) 100 MG tablet Take 1 tablet by mouth daily as needed for Erectile Dysfunction 6 tablet 4    Multiple Vitamins-Minerals (THERAPEUTIC MULTIVITAMIN-MINERALS) tablet Take 1 tablet by mouth daily       No current facility-administered medications on file prior to visit.      No Known Allergies  Review of Systems  Constitutional: negative  Eyes: negative  Ears, nose, mouth, throat, and face: negative  Respiratory: negative  Cardiovascular: negative  Gastrointestinal: negative  Genitourinary:negative  Integument/breast: negative  Hematologic/lymphatic: negative  Musculoskeletal:positive for arthralgias  Neurological: negative  Behavioral/Psych: negative  Endocrine: negative  Allergic/Immunologic: negative      Objective:      /80   Pulse 103   Ht 6' 2\" (1.88 m)   Wt (!) 366 lb (166 kg)   SpO2 96%   BMI 46.99 kg/m²     General Appearance:  Alert, cooperative, no distress, appears stated age   Head:  Normocephalic, without obvious abnormality, atraumatic   Eyes:  PERRL, conjunctiva/corneas clear, EOM's intact, fundi benign, both eyes   Ears:  Normal TM's and external ear canals, both ears   Nose: Nares normal, septum midline, mucosa normal, no drainage or sinus tenderness   Throat: Lips, mucosa, and tongue normal; teeth and gums normal   Neck: Supple, symmetrical, trachea midline, no adenopathy, thyroid: not enlarged, symmetric, no tenderness/mass/nodules, no carotid bruit or JVD   Back:   Symmetric, no curvature, ROM normal, no CVA tenderness   Lungs:   Clear to auscultation bilaterally, respirations unlabored   Chest Wall:  No tenderness or deformity   Heart:  Regular rate and rhythm, S1, S2 normal, no murmur, rub or gallop   Abdomen:   Soft, non-tender, bowel sounds active all four quadrants,  no masses, no organomegaly   Genitalia:  Normal male   Rectal:  Normal tone, normal prostate, no masses or tenderness;  guaiac negative stool   Extremities: Extremities normal, atraumatic, no cyanosis or edema   Pulses: 2+ and symmetric   Skin: Skin color, texture, turgor normal, no rashes or lesions   Lymph nodes: Cervical, supraclavicular, and axillary nodes normal   Neurologic: Normal         Predictors of intubation difficulty:   Morbid obesity? no   Anatomically abnormal facies? no   Prominent incisors? no   Receding mandible? no   Short, thick neck? no   Neck range of motion: normal   Mallampati score: I (soft palate, uvula, fauces, tonsillar pillars visible)   Thyromental distance: < 6cm   Mouth openincm   Dentition: No chipped, loose, or missing teeth. Cardiographics  ECG: no change since previous ECG dated       Lab Review   pending     Assessment:        61 y.o. male with planned surgery as above--OKd for planned procedure  Known risk factors for perioperative complications: None . Cardiac Risk Estimation: low risk    Current medications which may produce withdrawal symptoms if withheld perioperatively: none       Plan:      1. Preoperative workup as follows EKG--labs,u/a  2.  Change in medication regimen before surgery: none, continue med regimen including morning of surgery, w/sip of water  3. Prophylaxis for cardiac events with perioperative beta-blockers: not indicated  4. Invasive hemodynamic monitoring perioperatively: not indicated  5.  Deep vein thrombosis prophylaxis postoperatively:regimen to be chosen by surgical team

## 2022-03-29 ENCOUNTER — TELEPHONE (OUTPATIENT)
Dept: FAMILY MEDICINE CLINIC | Age: 57
End: 2022-03-29

## 2022-03-29 RX ORDER — CARVEDILOL 6.25 MG/1
TABLET ORAL
Qty: 360 TABLET | Refills: 3 | Status: SHIPPED | OUTPATIENT
Start: 2022-03-29

## 2022-03-29 RX ORDER — AMLODIPINE BESYLATE 10 MG/1
TABLET ORAL
Qty: 90 TABLET | Refills: 3 | Status: SHIPPED | OUTPATIENT
Start: 2022-03-29

## 2022-03-29 RX ORDER — LOSARTAN POTASSIUM 100 MG/1
TABLET ORAL
Qty: 90 TABLET | Refills: 3 | Status: SHIPPED | OUTPATIENT
Start: 2022-03-29

## 2022-04-14 PROBLEM — Z01.818 PRE-OP EXAM: Status: RESOLVED | Noted: 2021-12-14 | Resolved: 2022-04-14

## 2022-05-18 ENCOUNTER — OFFICE VISIT (OUTPATIENT)
Dept: FAMILY MEDICINE CLINIC | Age: 57
End: 2022-05-18
Payer: COMMERCIAL

## 2022-05-18 VITALS
HEIGHT: 74 IN | SYSTOLIC BLOOD PRESSURE: 136 MMHG | WEIGHT: 315 LBS | DIASTOLIC BLOOD PRESSURE: 80 MMHG | BODY MASS INDEX: 40.43 KG/M2 | HEART RATE: 99 BPM | OXYGEN SATURATION: 95 %

## 2022-05-18 DIAGNOSIS — R39.15 URINARY URGENCY: ICD-10-CM

## 2022-05-18 DIAGNOSIS — K21.9 GASTROESOPHAGEAL REFLUX DISEASE WITHOUT ESOPHAGITIS: ICD-10-CM

## 2022-05-18 DIAGNOSIS — I10 ESSENTIAL HYPERTENSION, BENIGN: ICD-10-CM

## 2022-05-18 DIAGNOSIS — Z12.5 PROSTATE CANCER SCREENING: ICD-10-CM

## 2022-05-18 DIAGNOSIS — N50.3 EPIDIDYMAL CYST: Primary | ICD-10-CM

## 2022-05-18 LAB
ANION GAP SERPL CALCULATED.3IONS-SCNC: 13 MMOL/L (ref 3–16)
BACTERIA: NORMAL /HPF
BASOPHILS ABSOLUTE: 0 K/UL (ref 0–0.2)
BASOPHILS RELATIVE PERCENT: 0.7 %
BILIRUBIN URINE: NEGATIVE
BLOOD, URINE: NEGATIVE
BUN BLDV-MCNC: 16 MG/DL (ref 7–20)
CALCIUM SERPL-MCNC: 9.6 MG/DL (ref 8.3–10.6)
CHLORIDE BLD-SCNC: 105 MMOL/L (ref 99–110)
CLARITY: CLEAR
CO2: 25 MMOL/L (ref 21–32)
COLOR: YELLOW
CREAT SERPL-MCNC: 0.9 MG/DL (ref 0.9–1.3)
EOSINOPHILS ABSOLUTE: 0.2 K/UL (ref 0–0.6)
EOSINOPHILS RELATIVE PERCENT: 3.5 %
EPITHELIAL CELLS, UA: 1 /HPF (ref 0–5)
GFR AFRICAN AMERICAN: >60
GFR NON-AFRICAN AMERICAN: >60
GLUCOSE BLD-MCNC: 91 MG/DL (ref 70–99)
GLUCOSE URINE: NEGATIVE MG/DL
HCT VFR BLD CALC: 36.7 % (ref 40.5–52.5)
HEMOGLOBIN: 11.9 G/DL (ref 13.5–17.5)
HYALINE CASTS: 0 /LPF (ref 0–8)
KETONES, URINE: NEGATIVE MG/DL
LEUKOCYTE ESTERASE, URINE: NEGATIVE
LYMPHOCYTES ABSOLUTE: 0.9 K/UL (ref 1–5.1)
LYMPHOCYTES RELATIVE PERCENT: 16.6 %
MCH RBC QN AUTO: 32.7 PG (ref 26–34)
MCHC RBC AUTO-ENTMCNC: 32.6 G/DL (ref 31–36)
MCV RBC AUTO: 100.4 FL (ref 80–100)
MICROSCOPIC EXAMINATION: YES
MONOCYTES ABSOLUTE: 0.5 K/UL (ref 0–1.3)
MONOCYTES RELATIVE PERCENT: 8.5 %
NEUTROPHILS ABSOLUTE: 3.8 K/UL (ref 1.7–7.7)
NEUTROPHILS RELATIVE PERCENT: 70.7 %
NITRITE, URINE: NEGATIVE
PDW BLD-RTO: 15.3 % (ref 12.4–15.4)
PH UA: 6.5 (ref 5–8)
PLATELET # BLD: 296 K/UL (ref 135–450)
PMV BLD AUTO: 8.1 FL (ref 5–10.5)
POTASSIUM SERPL-SCNC: 3.7 MMOL/L (ref 3.5–5.1)
PROSTATE SPECIFIC ANTIGEN: 0.84 NG/ML (ref 0–4)
PROTEIN UA: ABNORMAL MG/DL
RBC # BLD: 3.65 M/UL (ref 4.2–5.9)
RBC UA: 3 /HPF (ref 0–4)
SODIUM BLD-SCNC: 143 MMOL/L (ref 136–145)
SPECIFIC GRAVITY UA: 1.02 (ref 1–1.03)
URINE REFLEX TO CULTURE: ABNORMAL
URINE TYPE: ABNORMAL
UROBILINOGEN, URINE: 1 E.U./DL
WBC # BLD: 5.4 K/UL (ref 4–11)
WBC UA: 0 /HPF (ref 0–5)

## 2022-05-18 PROCEDURE — G8417 CALC BMI ABV UP PARAM F/U: HCPCS | Performed by: FAMILY MEDICINE

## 2022-05-18 PROCEDURE — G8427 DOCREV CUR MEDS BY ELIG CLIN: HCPCS | Performed by: FAMILY MEDICINE

## 2022-05-18 PROCEDURE — 3017F COLORECTAL CA SCREEN DOC REV: CPT | Performed by: FAMILY MEDICINE

## 2022-05-18 PROCEDURE — 1036F TOBACCO NON-USER: CPT | Performed by: FAMILY MEDICINE

## 2022-05-18 PROCEDURE — 99214 OFFICE O/P EST MOD 30 MIN: CPT | Performed by: FAMILY MEDICINE

## 2022-05-18 ASSESSMENT — PATIENT HEALTH QUESTIONNAIRE - PHQ9
SUM OF ALL RESPONSES TO PHQ QUESTIONS 1-9: 0
2. FEELING DOWN, DEPRESSED OR HOPELESS: 0
SUM OF ALL RESPONSES TO PHQ QUESTIONS 1-9: 0
1. LITTLE INTEREST OR PLEASURE IN DOING THINGS: 0
SUM OF ALL RESPONSES TO PHQ9 QUESTIONS 1 & 2: 0

## 2022-05-18 NOTE — PROGRESS NOTES
Subjective:     Patient ID:Mekhi Hill is a 61 y.o. male. Testicle Pain  This is a chronic problem. The current episode started more than 1 year ago (started after vasectomy 17 yrs ago and slowly worse). The problem occurs constantly. The problem has been gradually worsening. Associated symptoms include urinary symptoms (urin freq). Associated symptoms comments: Severe swelling of scrotum. Nothing aggravates the symptoms. He has tried nothing for the symptoms. The treatment provided no relief. No Known Allergies    Current Outpatient Medications   Medication Sig Dispense Refill    amLODIPine (NORVASC) 10 MG tablet TAKE 1 TABLET BY MOUTH ONCE DAILY 90 tablet 3    losartan (COZAAR) 100 MG tablet TAKE 1 TABLET BY MOUTH ONCE DAILY 90 tablet 3    carvedilol (COREG) 6.25 MG tablet TAKE 2 TABLETS BY MOUTH  TWICE DAILY WITH MEALS 360 tablet 3    sildenafil (VIAGRA) 100 MG tablet Take 1 tablet by mouth daily as needed for Erectile Dysfunction 6 tablet 4    Multiple Vitamins-Minerals (THERAPEUTIC MULTIVITAMIN-MINERALS) tablet Take 1 tablet by mouth daily       No current facility-administered medications for this visit.        Past Medical History:   Diagnosis Date    Chest pain 1/11    [facility]--neg labs and stress echo    Essential hypertension, benign     H/O: Bell's palsy 06    occ has sx    Morbid obesity ([address]     DANIELLE on CPAP     Unspecified sleep apnea        Past Surgical History:   Procedure Laterality Date    COLONOSCOPY  02/2017    q 10    GASTRIC BAND  2008    KNEE SURGERY Bilateral 1992 and  2005     TOTAL KNEE ARTHROPLASTY Left 01/2022    True       Social History     Socioeconomic History    Marital status:      Spouse name: Not on file    Number of children: 2    Years of education: Not on file    Highest education level: Not on file   Occupational History    Occupation:  at [address] [place] [address] [address]    Smoking status: Never Smoker    Smokeless tobacco: Never Used   Vaping Use  Vaping Use: Never used   Substance and Sexual Activity    Alcohol use: Yes     Alcohol/week: 1.0 standard drink     Types: 1 Glasses of wine per week     Comment: occasional    Drug use: No    Sexual activity: Yes     Partners: Female   Other Topics Concern    Not on file   Social History Narrative    Exercise--no    + seatbelts    Happily --mom lives with them--2 kids and 1 kid with [employer]    Works>50--;lots of stress but good support     Social Determinants of Health     Financial Resource Strain: Low Risk     Difficulty of Paying Living Expenses: Not hard at all   Food Insecurity: No Food Insecurity    Worried About [address] in the Last Year: Never true    [address] [employer] in the Last Year: Never true   Transportation Needs:     Lack of Transportation (Medical): Not on file    Lack of Transportation (Non-Medical):  Not on file   Physical Activity:     Days of Exercise per Week: Not on file    Minutes of Exercise per Session: Not on file   Stress:     Feeling of Stress : Not on file   Social Connections:     Frequency of Communication with Friends and Family: Not on file    Frequency of Social Gatherings with Friends and Family: Not on file    Attends Advent Services: Not on file    Active Member of [employer] or Organizations: Not on file    Attends Club or Organization Meetings: Not on file    Marital Status: Not on file   Intimate Partner Violence:     Fear of Current or Ex-Partner: Not on file    Emotionally Abused: Not on file    Physically Abused: Not on file    Sexually Abused: Not on file   Housing Stability:     Unable to Pay for Housing in the Last Year: Not on file    Number of [place] in the Last Year: Not on file    Unstable Housing in the Last Year: Not on file       Family History   Problem Relation Age of Onset    Arthritis Mother     Hearing Loss Mother     High Blood Pressure Mother     Cancer Mother 80        uterine  Hearing Loss Father     Heart Disease Father     High Blood Pressure Father     Stroke Father     Cancer Father 80        pancreas       Immunization History   Administered Date(s) Administered    COVID-19, [place], Primary or Immunocompromised, PF, 100mcg/0.5mL 03/18/2021, 04/15/2021, 12/01/2021    Influenza Vaccine, unspecified formulation 10/24/2018    Influenza Virus Vaccine 10/16/2015, 10/16/2019    Influenza, MDCK Quadv, IM, PF (Flucelvax 2 yrs and older) 12/14/2021    Pneumococcal Polysaccharide ([id number]) 10/16/2015    Td, unspecified formulation 07/28/2004    Tdap (Boostrix, Adacel) 01/26/2011, 12/14/2021       Review of Systems  Review of Systems    Objective:   Physical Exam  Physical Exam  Abdominal:      General: Abdomen is flat. Bowel sounds are normal. There is no distension. Palpations: Abdomen is soft. There is no mass. Tenderness: There is no abdominal tenderness. There is no guarding or rebound. Hernia: No hernia is present.    Genitourinary:     Comments: Large scrotum with jey R>L epididymal cysts        Assessment and Plan:     Epididymal cyst   Refer to Michel Morrell    Urinary urgency   Uncontrolled, changes made today: labs and U/A    Essential hypertension, benign   Well-controlled, continue current medications    GERD (gastroesophageal reflux disease)   Well-controlled, continue current medications

## 2022-11-14 RX ORDER — CARVEDILOL 6.25 MG/1
TABLET ORAL
Qty: 360 TABLET | Refills: 3 | Status: SHIPPED | OUTPATIENT
Start: 2022-11-14

## 2023-05-08 RX ORDER — LOSARTAN POTASSIUM 100 MG/1
TABLET ORAL
Qty: 90 TABLET | Refills: 3 | Status: SHIPPED | OUTPATIENT
Start: 2023-05-08

## 2023-05-08 RX ORDER — AMLODIPINE BESYLATE 10 MG/1
TABLET ORAL
Qty: 90 TABLET | Refills: 3 | Status: SHIPPED | OUTPATIENT
Start: 2023-05-08

## 2023-09-02 RX ORDER — SILDENAFIL 100 MG/1
100 TABLET, FILM COATED ORAL DAILY PRN
Qty: 6 TABLET | Refills: 4 | Status: SHIPPED | OUTPATIENT
Start: 2023-09-02

## 2024-02-02 RX ORDER — CARVEDILOL 6.25 MG/1
TABLET ORAL
Qty: 360 TABLET | Refills: 3 | Status: SHIPPED | OUTPATIENT
Start: 2024-02-02

## 2024-04-25 RX ORDER — LOSARTAN POTASSIUM 100 MG/1
TABLET ORAL
Qty: 90 TABLET | Refills: 3 | OUTPATIENT
Start: 2024-04-25

## 2024-04-25 RX ORDER — AMLODIPINE BESYLATE 10 MG/1
TABLET ORAL
Qty: 90 TABLET | Refills: 3 | OUTPATIENT
Start: 2024-04-25

## 2024-05-01 ENCOUNTER — OFFICE VISIT (OUTPATIENT)
Dept: FAMILY MEDICINE CLINIC | Age: 59
End: 2024-05-01
Payer: COMMERCIAL

## 2024-05-01 VITALS
SYSTOLIC BLOOD PRESSURE: 130 MMHG | OXYGEN SATURATION: 95 % | HEIGHT: 74 IN | WEIGHT: 315 LBS | HEART RATE: 75 BPM | BODY MASS INDEX: 40.43 KG/M2 | TEMPERATURE: 97.3 F | DIASTOLIC BLOOD PRESSURE: 86 MMHG

## 2024-05-01 DIAGNOSIS — M16.12 PRIMARY OSTEOARTHRITIS OF LEFT HIP: Primary | ICD-10-CM

## 2024-05-01 PROCEDURE — G8427 DOCREV CUR MEDS BY ELIG CLIN: HCPCS | Performed by: FAMILY MEDICINE

## 2024-05-01 PROCEDURE — G8417 CALC BMI ABV UP PARAM F/U: HCPCS | Performed by: FAMILY MEDICINE

## 2024-05-01 PROCEDURE — 1036F TOBACCO NON-USER: CPT | Performed by: FAMILY MEDICINE

## 2024-05-01 PROCEDURE — 3017F COLORECTAL CA SCREEN DOC REV: CPT | Performed by: FAMILY MEDICINE

## 2024-05-01 PROCEDURE — 3079F DIAST BP 80-89 MM HG: CPT | Performed by: FAMILY MEDICINE

## 2024-05-01 PROCEDURE — 99213 OFFICE O/P EST LOW 20 MIN: CPT | Performed by: FAMILY MEDICINE

## 2024-05-01 PROCEDURE — 3075F SYST BP GE 130 - 139MM HG: CPT | Performed by: FAMILY MEDICINE

## 2024-05-01 RX ORDER — NABUMETONE 500 MG/1
500-1000 TABLET, FILM COATED ORAL 2 TIMES DAILY PRN
Qty: 60 TABLET | Refills: 0 | Status: SHIPPED | OUTPATIENT
Start: 2024-05-01

## 2024-05-01 RX ORDER — CYCLOBENZAPRINE HCL 10 MG
10 TABLET ORAL 2 TIMES DAILY PRN
Qty: 30 TABLET | Refills: 0 | Status: SHIPPED | OUTPATIENT
Start: 2024-05-01

## 2024-05-01 RX ORDER — ASPIRIN 81 MG/1
81 TABLET ORAL DAILY
COMMUNITY

## 2024-05-01 SDOH — ECONOMIC STABILITY: HOUSING INSECURITY
IN THE LAST 12 MONTHS, WAS THERE A TIME WHEN YOU DID NOT HAVE A STEADY PLACE TO SLEEP OR SLEPT IN A SHELTER (INCLUDING NOW)?: NO

## 2024-05-01 SDOH — ECONOMIC STABILITY: INCOME INSECURITY: HOW HARD IS IT FOR YOU TO PAY FOR THE VERY BASICS LIKE FOOD, HOUSING, MEDICAL CARE, AND HEATING?: NOT HARD AT ALL

## 2024-05-01 SDOH — ECONOMIC STABILITY: FOOD INSECURITY: WITHIN THE PAST 12 MONTHS, THE FOOD YOU BOUGHT JUST DIDN'T LAST AND YOU DIDN'T HAVE MONEY TO GET MORE.: NEVER TRUE

## 2024-05-01 SDOH — ECONOMIC STABILITY: FOOD INSECURITY: WITHIN THE PAST 12 MONTHS, YOU WORRIED THAT YOUR FOOD WOULD RUN OUT BEFORE YOU GOT MONEY TO BUY MORE.: NEVER TRUE

## 2024-05-01 ASSESSMENT — PATIENT HEALTH QUESTIONNAIRE - PHQ9
SUM OF ALL RESPONSES TO PHQ QUESTIONS 1-9: 0
SUM OF ALL RESPONSES TO PHQ9 QUESTIONS 1 & 2: 0
2. FEELING DOWN, DEPRESSED OR HOPELESS: NOT AT ALL
1. LITTLE INTEREST OR PLEASURE IN DOING THINGS: NOT AT ALL
SUM OF ALL RESPONSES TO PHQ QUESTIONS 1-9: 0

## 2024-05-01 NOTE — PROGRESS NOTES
Subjective    Mekhi Robles is a 59 y.o. Male who came into the clinic today with concerns of pain in his left hip for past 1 week.    The patient informs me that he was on a trip to [place] where he had to walk a lot and he started noticing   pain in his left hip which only kept on getting worse.  The patient has not taken any over-the-counter medications   for it but would like to get himself checked today.  Going through his chart I noted that the patient had x-ray   and CT scan of his hip done which showed osteoarthritis.  The patient was wondering if he could have any   prescription medication to help with his ongoing symptoms.  No other questions or concerns today and all the   question concerns were appropriately answered.    Past Medical History:   Diagnosis Date    Chest pain 1/11    [facility]--neg labs and stress echo    Essential hypertension, benign     H/O: Bell's palsy 06    occ has sx    Morbid obesity (HCC)     DANIELLE on CPAP     Unspecified sleep apnea        Patient Active Problem List   Diagnosis    Primary cardiomyopathy (HCC)    Arthropathy    Morbid obesity (HCC)    DANIELLE on CPAP    H/O: Bell's palsy    Low back pain    Essential hypertension, benign    GERD (gastroesophageal reflux disease)    Osteoarthritis of left knee    Status post total left knee replacement    Primary localized osteoarthritis of right knee    Epididymal cyst    Urinary urgency       Past Surgical History:   Procedure Laterality Date    COLONOSCOPY  02/2017    q 10    GASTRIC BAND  2008    KNEE SURGERY Bilateral 1992 and  2005     TOTAL KNEE ARTHROPLASTY Left 01/2022    True       Family History   Problem Relation Age of Onset    Arthritis Mother     Hearing Loss Mother     High Blood Pressure Mother     Cancer Mother 83        uterine    Hearing Loss Father     Heart Disease Father     High Blood Pressure Father     Stroke Father     Cancer Father 82        pancreas       Social History     Tobacco Use    Smoking status: Never

## 2024-05-06 ENCOUNTER — HOSPITAL ENCOUNTER (OUTPATIENT)
Dept: GENERAL RADIOLOGY | Age: 59
Discharge: HOME | End: 2024-05-06
Payer: COMMERCIAL

## 2024-05-06 ENCOUNTER — OFFICE VISIT (OUTPATIENT)
Dept: FAMILY MEDICINE CLINIC | Age: 59
End: 2024-05-06
Payer: COMMERCIAL

## 2024-05-06 VITALS
WEIGHT: 315 LBS | BODY MASS INDEX: 40.43 KG/M2 | TEMPERATURE: 97 F | OXYGEN SATURATION: 96 % | DIASTOLIC BLOOD PRESSURE: 74 MMHG | SYSTOLIC BLOOD PRESSURE: 136 MMHG | HEART RATE: 83 BPM | HEIGHT: 74 IN

## 2024-05-06 DIAGNOSIS — M25.511 ACUTE PAIN OF RIGHT SHOULDER: Primary | ICD-10-CM

## 2024-05-06 DIAGNOSIS — M25.511 ACUTE PAIN OF RIGHT SHOULDER: ICD-10-CM

## 2024-05-06 PROCEDURE — 3075F SYST BP GE 130 - 139MM HG: CPT | Performed by: FAMILY MEDICINE

## 2024-05-06 PROCEDURE — 3078F DIAST BP <80 MM HG: CPT | Performed by: FAMILY MEDICINE

## 2024-05-06 PROCEDURE — 73030 X-RAY EXAM OF SHOULDER: CPT

## 2024-05-06 PROCEDURE — 3017F COLORECTAL CA SCREEN DOC REV: CPT | Performed by: FAMILY MEDICINE

## 2024-05-06 PROCEDURE — 99213 OFFICE O/P EST LOW 20 MIN: CPT | Performed by: FAMILY MEDICINE

## 2024-05-06 PROCEDURE — G8427 DOCREV CUR MEDS BY ELIG CLIN: HCPCS | Performed by: FAMILY MEDICINE

## 2024-05-06 PROCEDURE — G8417 CALC BMI ABV UP PARAM F/U: HCPCS | Performed by: FAMILY MEDICINE

## 2024-05-06 PROCEDURE — 1036F TOBACCO NON-USER: CPT | Performed by: FAMILY MEDICINE

## 2024-05-06 NOTE — PROGRESS NOTES
Subjective    Mekhi Robles is a 59 y.o. Male who came into the clinic today with concerns of pain in his right shoulder since   past 2 days.  The patient denies any history of any trauma, fall, strenuous physical activity, lifting anything heavy   or injury.  About 2 days back the patient started noticing pain in his right shoulder which has not gotten better.    The patient has been taking NSAIDs and muscle relaxer with no relief so is here to get himself checked.  The   patient denies any numbness, tingling in the right upper extremity.  The patient denies any weakness.  Pain is   worse in right overhead abduction.  The patient would like it to be further investigated.  No other questions or   concerns today and all the question and concerns were appropriately answered.    Past Medical History:   Diagnosis Date    Chest pain 1/11    [facility]--neg labs and stress echo    Essential hypertension, benign     H/O: Bell's palsy 06    occ has sx    Morbid obesity (HCC)     DANIELLE on CPAP     Unspecified sleep apnea        Patient Active Problem List   Diagnosis    Primary cardiomyopathy (HCC)    Arthropathy    Morbid obesity (HCC)    DANIELLE on CPAP    H/O: Bell's palsy    Low back pain    Essential hypertension, benign    GERD (gastroesophageal reflux disease)    Osteoarthritis of left knee    Status post total left knee replacement    Primary localized osteoarthritis of right knee    Epididymal cyst    Urinary urgency       Past Surgical History:   Procedure Laterality Date    COLONOSCOPY  02/2017    q 10    GASTRIC BAND  2008    KNEE SURGERY Bilateral 1992 and  2005     TOTAL KNEE ARTHROPLASTY Left 01/2022    True       Family History   Problem Relation Age of Onset    Arthritis Mother     Hearing Loss Mother     High Blood Pressure Mother     Cancer Mother 83        uterine    Hearing Loss Father     Heart Disease Father     High Blood Pressure Father     Stroke Father     Cancer Father 82        pancreas       Social History

## 2024-05-14 ENCOUNTER — HOSPITAL ENCOUNTER (OUTPATIENT)
Dept: PHYSICAL THERAPY | Age: 59
Setting detail: THERAPIES SERIES
Discharge: HOME | End: 2024-05-14
Payer: COMMERCIAL

## 2024-05-14 PROCEDURE — 97161 PT EVAL LOW COMPLEX 20 MIN: CPT | Performed by: PHYSICAL THERAPIST

## 2024-05-14 PROCEDURE — 97110 THERAPEUTIC EXERCISES: CPT | Performed by: PHYSICAL THERAPIST

## 2024-05-14 PROCEDURE — 97530 THERAPEUTIC ACTIVITIES: CPT | Performed by: PHYSICAL THERAPIST

## 2024-05-14 NOTE — PLAN OF CARE
personal factors that will affect rehab potential:   Co-morbidities    Physical Therapy Evaluation Complexity Justification  [x] A history of present problem and 1-2 personal factors and/or co-morbidities that impact the plan of care  [x] A total of 1-2 elements  found upon examination of body systems using standardized tests and measures addressing any of the following: body structures, functions (impairments), activity limitations, and/or participation restrictions  [x] A clinical presentation with stable and/or uncomplicated characteristics   [x] Clinical decision making of LOW (01814 - Typically 20 minutes face-to-face) complexity using standardized patient assessment instrument and/or measurable assessment of functional outcome.    Today's Assessment: See above    Medical Necessity Documentation:  I certify that this patient meets the below criteria necessary for medical necessity for care and/or justification of therapy services:  The patient has functional impairments and/or activity limitations and would benefit from continued outpatient therapy services to address the deficits outlined in the patients goals    Return to Play: NA    Prognosis for POC: [x] Good [] Fair  [] Poor    Patient requires continued skilled intervention: [x] Yes  [] No      CHARGE CAPTURE     PT CHARGE GRID   CPT Code (TIMED) minutes # CPT Code (UNTIMED) #     Therex (64558)   1  EVAL:LOW (56003 - Typically 20 minutes face-to-face) 1    Neuromusc. Re-ed (21647)    Re-Eval (58139)     Manual (42390)    Estim Unattended (83109)     Ther. Act (21645)  1  [facility] T[facility] (16032)     Gait (18920)    Dry Needle 1-2 muscle (52028)     Aquatic Therex (15631)    Dry Needle 3+ muscle (20561)     Iontophoresis (40784)    VASO (49820)     Ultrasound (95947)    Group Therapy (21479)     Estim Attended (44627)    Canalith Repositioning (19815)     Other:    Other:    Total Timed Code Tx Minutes  2  1     Total Treatment Minutes 50        Charge

## 2024-05-15 DIAGNOSIS — M25.511 ACUTE PAIN OF RIGHT SHOULDER: Primary | ICD-10-CM

## 2024-05-16 ENCOUNTER — HOSPITAL ENCOUNTER (OUTPATIENT)
Dept: PHYSICAL THERAPY | Age: 59
Setting detail: THERAPIES SERIES
Discharge: HOME | End: 2024-05-16
Payer: COMMERCIAL

## 2024-05-16 PROCEDURE — 97140 MANUAL THERAPY 1/> REGIONS: CPT | Performed by: PHYSICAL THERAPIST

## 2024-05-16 PROCEDURE — 97110 THERAPEUTIC EXERCISES: CPT | Performed by: PHYSICAL THERAPIST

## 2024-05-16 NOTE — FLOWSHEET NOTE
Met: [] Adjusted  5. Patient will report 70% improvement in pain and function overall.   [x] Progressing: [] Met: [] Not Met: [] Adjusted     Overall Progression Towards Functional goals/ Treatment Progress Update:  [] Patient is progressing as expected towards functional goals listed.    [] Progression is slowed due to complexities/Impairments listed.  [] Progression has been slowed due to co-morbidities.  [x] Plan just implemented, too soon (<30days) to assess goals progression   [] Goals require adjustment due to lack of progress  [] Patient is not progressing as expected and requires additional follow up with physician  [] Other:     TREATMENT PLAN     Frequency/Duration: 2x/week for 4-6 weeks for the following treatment interventions:    Plan: Cont POC- Continue emphasis/focus on exercise progression, modulating pain, and increasing ROM. Next visit plan to progress reps and add new exercises     Electronically Signed by Coleen Mendoza, PT MPT  [id number]   Date: 05/16/2024     Note: Portions of this note have been templated and/or copied from initial evaluation, reassessments and prior notes for documentation efficiency.    Note: If patient does not return for scheduled/recommended follow up visits, this note will serve as a discharge from care along with the most recent update on progress.

## 2024-05-17 ENCOUNTER — OFFICE VISIT (OUTPATIENT)
Dept: ORTHOPEDIC SURGERY | Age: 59
End: 2024-05-17
Payer: COMMERCIAL

## 2024-05-17 VITALS — WEIGHT: 315 LBS | HEIGHT: 74 IN | BODY MASS INDEX: 40.43 KG/M2

## 2024-05-17 DIAGNOSIS — M25.511 BILATERAL SHOULDER PAIN, UNSPECIFIED CHRONICITY: Primary | ICD-10-CM

## 2024-05-17 DIAGNOSIS — M19.012 PRIMARY OSTEOARTHRITIS OF BOTH SHOULDERS: ICD-10-CM

## 2024-05-17 DIAGNOSIS — M25.512 BILATERAL SHOULDER PAIN, UNSPECIFIED CHRONICITY: Primary | ICD-10-CM

## 2024-05-17 DIAGNOSIS — M19.011 PRIMARY OSTEOARTHRITIS OF BOTH SHOULDERS: ICD-10-CM

## 2024-05-17 PROCEDURE — G8427 DOCREV CUR MEDS BY ELIG CLIN: HCPCS | Performed by: ORTHOPAEDIC SURGERY

## 2024-05-17 PROCEDURE — 99204 OFFICE O/P NEW MOD 45 MIN: CPT | Performed by: ORTHOPAEDIC SURGERY

## 2024-05-17 PROCEDURE — G8417 CALC BMI ABV UP PARAM F/U: HCPCS | Performed by: ORTHOPAEDIC SURGERY

## 2024-05-17 PROCEDURE — 3017F COLORECTAL CA SCREEN DOC REV: CPT | Performed by: ORTHOPAEDIC SURGERY

## 2024-05-17 PROCEDURE — 1036F TOBACCO NON-USER: CPT | Performed by: ORTHOPAEDIC SURGERY

## 2024-05-17 RX ORDER — CELECOXIB 200 MG/1
200 CAPSULE ORAL DAILY
Qty: 60 CAPSULE | Refills: 3 | Status: SHIPPED | OUTPATIENT
Start: 2024-05-17

## 2024-05-17 NOTE — PROGRESS NOTES
Arthritis Mother     Hearing Loss Mother     High Blood Pressure Mother     Cancer Mother 83        uterine    Hearing Loss Father     Heart Disease Father     High Blood Pressure Father     Stroke Father     Cancer Father 82        pancreas       Social History     Socioeconomic History    Marital status:      Spouse name: None    Number of children: 2    Years of education: None    Highest education level: None   Occupational History    Occupation:  at [employer]   Tobacco Use    Smoking status: Never    Smokeless tobacco: Never   Vaping Use    Vaping Use: Never used   Substance and Sexual Activity    Alcohol use: Yes     Alcohol/week: 1.0 standard drink of alcohol     Types: 1 Glasses of wine per week     Comment: occasional    Drug use: No    Sexual activity: Yes     Partners: Female   Social History Narrative    Exercise--no    + seatbelts    Happily --mom lives with them--2 kids and 1 kid with [employer]    Works>50--;lots of stress but good support     Social Determinants of Health     Financial Resource Strain: Low Risk  (5/1/2024)    Overall Financial Resource Strain (CARDIA)     Difficulty of Paying Living Expenses: Not hard at all   Food Insecurity: No Food Insecurity (5/1/2024)    Hunger Vital Sign     Worried About Running Out of Food in the Last Year: Never true     Ran Out of Food in the Last Year: Never true   Transportation Needs: Unknown (5/1/2024)    PRAPARE - Transportation     Lack of Transportation (Non-Medical): No   Housing Stability: Unknown (5/1/2024)    Housing Stability Vital Sign     Unstable Housing in the Last Year: No       Current Outpatient Medications   Medication Sig Dispense Refill    aspirin 81 MG EC tablet Take 1 tablet by mouth daily      OMEPRAZOLE PO Take by mouth daily      nabumetone (RELAFEN) 500 MG tablet Take 1-2 tablets by mouth 2 times daily as needed for Pain 60 tablet 0    cyclobenzaprine (FLEXERIL) 10 MG tablet Take 1 tablet by

## 2024-05-21 ENCOUNTER — HOSPITAL ENCOUNTER (OUTPATIENT)
Dept: PHYSICAL THERAPY | Age: 59
Setting detail: THERAPIES SERIES
Discharge: HOME | End: 2024-05-21
Payer: COMMERCIAL

## 2024-05-21 PROCEDURE — 97140 MANUAL THERAPY 1/> REGIONS: CPT | Performed by: PHYSICAL THERAPIST

## 2024-05-21 PROCEDURE — 97110 THERAPEUTIC EXERCISES: CPT | Performed by: PHYSICAL THERAPIST

## 2024-05-21 NOTE — FLOWSHEET NOTE
[facility] - Outpatient Rehabilitation and Therapy [address] [place] [address] office: [phone] fax: [phone]       Physical Therapy: TREATMENT/PROGRESS NOTE   Patient: Mekhi Robles (59 y.o. male)  Mekhi Examination Date: 2024   :  1965 MRN: [id number]   Visit #: 3   Insurance Allowable Auth Needed   [place] []Yes    []No    Insurance: Payor: UNITED HEALTHCARE / Plan: [employer] - CHOICE PLU / Product Type: *No Product type* /   Insurance ID: [id number] - (Commercial)  Secondary Insurance (if applicable):    Treatment Diagnosis:   B Shldr Pain with weakness and impacted function   Medical Diagnosis:  Acute pain of right shoulder [M25.511]   Referring Physician: Donald Molina MD  PCP: Suleman Navarro MD     Plan of care signed (Y/N):     Date of Patient follow up with Physician:      Progress Report/POC: NO  POC update due: (10 visits /OR AUTH LIMITS, whichever is less)  2024                                             Precautions/ Contra-indications:           Latex allergy:  NO  Pacemaker:    NO  Contraindications for Manipulation: None  Date of Surgery:   Other:    Red Flags:  None    C-SSRS Triggered by Intake questionnaire:   Patient answered 'NO' to both behavioral questions on intake.  No further screening warranted    Preferred Language for Healthcare:   [x] English       [] other:    History:  Patient reports onset of sharp R shldr pain 1.5 week ago. He has had episodes of pain, where he was unable to lift arm. He also has L shldr pain with a h/o OA. His sleep and function is impacted. Patient was referred to PT by PCP.  SUBJECTIVE EXAMINATION     Patient stated complaint: Pt saw Dr. Sanz for B shldrs on Friday. He is R shldr is feling better but L is not. Main c/o is hip pn today - seeing hip MD later this week.     Test used Initial score  2024   Pain Summary VAS 6/10 6/10 L UE   Functional questionnaire Quick

## 2024-05-23 ENCOUNTER — OFFICE VISIT (OUTPATIENT)
Dept: ORTHOPEDIC SURGERY | Age: 59
End: 2024-05-23
Payer: COMMERCIAL

## 2024-05-23 ENCOUNTER — HOSPITAL ENCOUNTER (OUTPATIENT)
Dept: PHYSICAL THERAPY | Age: 59
Setting detail: THERAPIES SERIES
Discharge: HOME | End: 2024-05-23
Payer: COMMERCIAL

## 2024-05-23 VITALS — BODY MASS INDEX: 40.43 KG/M2 | HEIGHT: 74 IN | WEIGHT: 315 LBS

## 2024-05-23 DIAGNOSIS — M25.552 LEFT HIP PAIN: Primary | ICD-10-CM

## 2024-05-23 PROCEDURE — 97140 MANUAL THERAPY 1/> REGIONS: CPT | Performed by: PHYSICAL THERAPIST

## 2024-05-23 PROCEDURE — 97110 THERAPEUTIC EXERCISES: CPT | Performed by: PHYSICAL THERAPIST

## 2024-05-23 PROCEDURE — 97530 THERAPEUTIC ACTIVITIES: CPT | Performed by: PHYSICAL THERAPIST

## 2024-05-23 PROCEDURE — 3017F COLORECTAL CA SCREEN DOC REV: CPT | Performed by: ORTHOPAEDIC SURGERY

## 2024-05-23 PROCEDURE — G8417 CALC BMI ABV UP PARAM F/U: HCPCS | Performed by: ORTHOPAEDIC SURGERY

## 2024-05-23 PROCEDURE — G8427 DOCREV CUR MEDS BY ELIG CLIN: HCPCS | Performed by: ORTHOPAEDIC SURGERY

## 2024-05-23 PROCEDURE — 1036F TOBACCO NON-USER: CPT | Performed by: ORTHOPAEDIC SURGERY

## 2024-05-23 PROCEDURE — 99203 OFFICE O/P NEW LOW 30 MIN: CPT | Performed by: ORTHOPAEDIC SURGERY

## 2024-05-23 NOTE — PROGRESS NOTES
5/23/2024     Reason for visit:  L hip pain x3 weeks     History of Present Illness:  59 year old male is presenting today with L hip pain of 3 weeks duration.  He denies any injury or incident that may have lead to his pain.  He locates his pain over the lateral aspect of his hip with occasional pain more into the groin.  He states this is really bothering him at night and over the last couple nights he has had difficulty sleeping due to his pain.  He has a history of bilateral knee osteoarthritis and underwent total knee arthroplasty by Dr. Siegel at [facility] several years ago.  He has done well with this.  Additionally, he has bilateral shoulder osteoarthritis and has been doing physical therapy for this through Dr. Sanz.  Dr. Sanz put him on celebrex which greatly helped his shoulders but has not touched his hip pain.    Medical History:  Past Medical History:   Diagnosis Date    Chest pain 1/11    [facility]--neg labs and stress echo    Essential hypertension, benign     H/O: Bell's palsy 06    occ has sx    Morbid obesity (HCC)     DANIELLE on CPAP     Unspecified sleep apnea       Past Surgical History:   Procedure Laterality Date    COLONOSCOPY  02/2017    q 10    GASTRIC BAND  2008    KNEE SURGERY Bilateral 1992 and  2005     TOTAL KNEE ARTHROPLASTY Left 01/2022    True      Family History   Problem Relation Age of Onset    Arthritis Mother     Hearing Loss Mother     High Blood Pressure Mother     Cancer Mother 83        uterine    Hearing Loss Father     Heart Disease Father     High Blood Pressure Father     Stroke Father     Cancer Father 82        pancreas      Social History     Socioeconomic History    Marital status:      Spouse name: Not on file    Number of children: 2    Years of education: Not on file    Highest education level: Not on file   Occupational History    Occupation:  at [employer]   Tobacco Use    Smoking status: Never    Smokeless tobacco: Never   Vaping Use    Vaping

## 2024-05-23 NOTE — FLOWSHEET NOTE
[facility] - Outpatient Rehabilitation and Therapy [address] [place] [address] office: [phone] fax: [phone]       Physical Therapy: TREATMENT/PROGRESS NOTE   Patient: Mekhi Robles (59 y.o. male)  Mekhi Examination Date: 2024   :  1965 MRN: [id number]   Visit #: 4   Insurance Allowable Auth Needed   MN []Yes    []No    Insurance: Payor: UNITED HEALTHCARE / Plan: [employer] - CHOICE PLU / Product Type: *No Product type* /   Insurance ID: [id number] - (Commercial)  Secondary Insurance (if applicable):    Treatment Diagnosis:   B Shldr Pain with weakness and impacted function   Medical Diagnosis:  Acute pain of right shoulder [M25.511]   Referring Physician: Donald Molina MD  PCP: Suleman Navarro MD     Plan of care signed (Y/N):     Date of Patient follow up with Physician:      Progress Report/POC: NO  POC update due: (10 visits /OR AUTH LIMITS, whichever is less)  2024                                             Precautions/ Contra-indications:           Latex allergy:  NO  Pacemaker:    NO  Contraindications for Manipulation: None  Date of Surgery:   Other:    Red Flags:  None    C-SSRS Triggered by Intake questionnaire:   Patient answered 'NO' to both behavioral questions on intake.  No further screening warranted    Preferred Language for Healthcare:   [x] English       [] other:    History:  Patient reports onset of sharp R shldr pain 1.5 week ago. He has had episodes of pain, where he was unable to lift arm. He also has L shldr pain with a h/o OA. His sleep and function is impacted. Patient was referred to PT by PCP.  SUBJECTIVE EXAMINATION     Patient stated complaint: Pt saw [facility] and is being referred to [facility] for a THR. Supposed to see Dr. Sanz for probable shldr injection in 2 weeks.     Test used Initial score  2024   Pain Summary VAS 6/10 3-4/10 L UE  6/10 R hip   Functional questionnaire Quick

## 2024-05-29 ENCOUNTER — OFFICE VISIT (OUTPATIENT)
Dept: ORTHOPEDIC SURGERY | Age: 59
End: 2024-05-29
Payer: COMMERCIAL

## 2024-05-29 VITALS — WEIGHT: 315 LBS | HEIGHT: 74 IN | BODY MASS INDEX: 40.43 KG/M2

## 2024-05-29 DIAGNOSIS — M16.12 PRIMARY LOCALIZED OSTEOARTHRITIS OF LEFT HIP: Primary | ICD-10-CM

## 2024-05-29 PROCEDURE — G8417 CALC BMI ABV UP PARAM F/U: HCPCS | Performed by: STUDENT IN AN ORGANIZED HEALTH CARE EDUCATION/TRAINING PROGRAM

## 2024-05-29 PROCEDURE — G8427 DOCREV CUR MEDS BY ELIG CLIN: HCPCS | Performed by: STUDENT IN AN ORGANIZED HEALTH CARE EDUCATION/TRAINING PROGRAM

## 2024-05-29 PROCEDURE — 99204 OFFICE O/P NEW MOD 45 MIN: CPT | Performed by: STUDENT IN AN ORGANIZED HEALTH CARE EDUCATION/TRAINING PROGRAM

## 2024-05-29 NOTE — PROGRESS NOTES
Dr Abram Alvarado      Date /Time 5/29/2024       11:19 AM EDT  Name Mekhi Robles             1965   Location  [facility]  MRN [id number]                Chief Complaint   Patient presents with    New Patient     NP left hip--  Ref Jg  Has imaging          History of Present Illness    Mekhi Robles is a 59 y.o. male who presents with left hip pain.  This is in the groin and laterally based.  Occasionally will radiate down the thigh but not past the knee.  Worse with rotation of the hip and bending forward.  Has previously managed this with Celebrex and Tylenol but it is not providing significant relief any longer and is having significant difficulty with ADLs. Interested in further treatment options.       Past History  Past Medical History:   Diagnosis Date    Chest pain 1/11    [facility]--neg labs and stress echo    Essential hypertension, benign     H/O: Bell's palsy 06    occ has sx    Morbid obesity (HCC)     DANIELLE on CPAP     Unspecified sleep apnea      Past Surgical History:   Procedure Laterality Date    COLONOSCOPY  02/2017    q 10    GASTRIC BAND  2008    KNEE SURGERY Bilateral 1992 and  2005     TOTAL KNEE ARTHROPLASTY Left 01/2022    True     Family History   Problem Relation Age of Onset    Arthritis Mother     Hearing Loss Mother     High Blood Pressure Mother     Cancer Mother 83        uterine    Hearing Loss Father     Heart Disease Father     High Blood Pressure Father     Stroke Father     Cancer Father 82        pancreas     Social History     Tobacco Use    Smoking status: Never    Smokeless tobacco: Never   Substance Use Topics    Alcohol use: Yes     Alcohol/week: 1.0 standard drink of alcohol     Types: 1 Glasses of wine per week     Comment: occasional      Current Outpatient Medications on File Prior to Visit   Medication Sig Dispense Refill    celecoxib (CELEBREX) 200 MG capsule Take 1 capsule by mouth daily 60 capsule 3    OMEPRAZOLE PO Take by mouth daily

## 2024-05-30 ENCOUNTER — HOSPITAL ENCOUNTER (OUTPATIENT)
Dept: PHYSICAL THERAPY | Age: 59
Setting detail: THERAPIES SERIES
Discharge: HOME | End: 2024-05-30
Payer: COMMERCIAL

## 2024-05-30 PROCEDURE — 97530 THERAPEUTIC ACTIVITIES: CPT

## 2024-05-30 PROCEDURE — 97140 MANUAL THERAPY 1/> REGIONS: CPT

## 2024-05-30 PROCEDURE — 97110 THERAPEUTIC EXERCISES: CPT

## 2024-05-30 NOTE — FLOWSHEET NOTE
return to lift normal household items.   [x] Progressing: [] Met: [] Not Met: [] Adjusted  4. Patient will return to sleep without increased symptoms or restriction.   [x] Progressing: [] Met: [] Not Met: [] Adjusted  5. Patient will report 70% improvement in pain and function overall.   [x] Progressing: [] Met: [] Not Met: [] Adjusted     Overall Progression Towards Functional goals/ Treatment Progress Update:  [] Patient is progressing as expected towards functional goals listed.    [x] Progression is slowed due to complexities/Impairments listed.  [] Progression has been slowed due to co-morbidities.  [] Plan just implemented, too soon (<30days) to assess goals progression   [] Goals require adjustment due to lack of progress  [] Patient is not progressing as expected and requires additional follow up with physician  [] Other:     TREATMENT PLAN     Frequency/Duration: 2x/week for 4-6 weeks for the following treatment interventions:    Plan: Cont POC- Continue emphasis/focus on exercise progression, modulating pain, and increasing ROM. Next visit plan to progress reps and add new exercises     Electronically Signed by Isa Reyes, PTA [id number]   Date: 05/30/2024     Note: Portions of this note have been templated and/or copied from initial evaluation, reassessments and prior notes for documentation efficiency.    Note: If patient does not return for scheduled/recommended follow up visits, this note will serve as a discharge from care along with the most recent update on progress.

## 2024-06-03 ENCOUNTER — PREP FOR PROCEDURE (OUTPATIENT)
Dept: ORTHOPEDIC SURGERY | Age: 59
End: 2024-06-03

## 2024-06-03 DIAGNOSIS — M16.12 PRIMARY LOCALIZED OSTEOARTHRITIS OF LEFT HIP: Primary | ICD-10-CM

## 2024-06-03 DIAGNOSIS — M16.12 PRIMARY OSTEOARTHRITIS OF LEFT HIP: ICD-10-CM

## 2024-06-03 RX ORDER — ACETAMINOPHEN 325 MG/1
1000 TABLET ORAL ONCE
Status: CANCELLED | OUTPATIENT
Start: 2024-06-03 | End: 2024-06-03

## 2024-06-03 RX ORDER — SODIUM CHLORIDE 0.9 % (FLUSH) 0.9 %
5-40 SYRINGE (ML) INJECTION PRN
Status: CANCELLED | OUTPATIENT
Start: 2024-06-03

## 2024-06-03 RX ORDER — SODIUM CHLORIDE 0.9 % (FLUSH) 0.9 %
5-40 SYRINGE (ML) INJECTION EVERY 12 HOURS SCHEDULED
Status: CANCELLED | OUTPATIENT
Start: 2024-06-03

## 2024-06-03 RX ORDER — SODIUM CHLORIDE 9 MG/ML
INJECTION, SOLUTION INTRAVENOUS PRN
Status: CANCELLED | OUTPATIENT
Start: 2024-06-03

## 2024-06-03 RX ORDER — CELECOXIB 200 MG/1
200 CAPSULE ORAL ONCE
Status: CANCELLED | OUTPATIENT
Start: 2024-06-03 | End: 2024-06-03

## 2024-06-04 ENCOUNTER — HOSPITAL ENCOUNTER (OUTPATIENT)
Dept: PHYSICAL THERAPY | Age: 59
Setting detail: THERAPIES SERIES
Discharge: HOME | End: 2024-06-04
Payer: COMMERCIAL

## 2024-06-04 PROCEDURE — 97140 MANUAL THERAPY 1/> REGIONS: CPT | Performed by: PHYSICAL THERAPIST

## 2024-06-04 PROCEDURE — G0283 ELEC STIM OTHER THAN WOUND: HCPCS | Performed by: PHYSICAL THERAPIST

## 2024-06-04 PROCEDURE — 97530 THERAPEUTIC ACTIVITIES: CPT | Performed by: PHYSICAL THERAPIST

## 2024-06-04 PROCEDURE — 97110 THERAPEUTIC EXERCISES: CPT | Performed by: PHYSICAL THERAPIST

## 2024-06-04 NOTE — FLOWSHEET NOTE
[facility] - Outpatient Rehabilitation and Therapy [address] [place] [address] office: [phone] fax: [phone]       Physical Therapy: TREATMENT/PROGRESS NOTE   Patient: Mekhi Robles (59 y.o. male)  Mekhi Examination Date: 2024   :  1965 MRN: [id number]   Visit #: 6   Insurance Allowable Auth Needed   [place] []Yes    []No    Insurance: Payor: [employer] / Plan: better. - CHOICE PLU / Product Type: *No Product type* /   Insurance ID: [id number] - (Commercial)  Secondary Insurance (if applicable):    Treatment Diagnosis:   B Shldr Pain with weakness and impacted function   Medical Diagnosis:  Acute pain of right shoulder [M25.511]   Referring Physician: Donald Molina MD  PCP: Suleman Navarro MD     Plan of care signed (Y/N): Y    Date of Patient follow up with Physician:      Progress Report/POC: NO  POC update due: (10 visits /OR AUTH LIMITS, whichever is less)  2024                                             Precautions/ Contra-indications:           Latex allergy:  NO  Pacemaker:    NO  Contraindications for Manipulation: None  Date of Surgery:   Other:    Red Flags:  None    C-SSRS Triggered by Intake questionnaire:   Patient answered 'NO' to both behavioral questions on intake.  No further screening warranted    Preferred Language for Healthcare:   [x] English       [] other:    History:  Patient reports onset of sharp R shldr pain 1.5 week ago. He has had episodes of pain, where he was unable to lift arm. He also has L shldr pain with a h/o OA. His sleep and function is impacted. Patient was referred to PT by PCP.  SUBJECTIVE EXAMINATION     Patient stated complaint: Pt reports that he aches all over. Going to rain. Getting THR scheduled for .     Test used Initial score  2024   Pain Summary VAS 6/10 4-510 L UE  6/10 R hip   Functional questionnaire Quick DASH 40/  66%    Other:              Other

## 2024-06-05 ENCOUNTER — HOSPITAL ENCOUNTER (OUTPATIENT)
Age: 59
Discharge: HOME | End: 2024-06-05

## 2024-06-05 ENCOUNTER — OFFICE VISIT (OUTPATIENT)
Dept: FAMILY MEDICINE CLINIC | Age: 59
End: 2024-06-05
Payer: COMMERCIAL

## 2024-06-05 VITALS
TEMPERATURE: 97.1 F | BODY MASS INDEX: 40.43 KG/M2 | OXYGEN SATURATION: 94 % | WEIGHT: 315 LBS | HEIGHT: 74 IN | SYSTOLIC BLOOD PRESSURE: 132 MMHG | DIASTOLIC BLOOD PRESSURE: 84 MMHG | HEART RATE: 84 BPM

## 2024-06-05 DIAGNOSIS — M16.12 PRIMARY LOCALIZED OSTEOARTHRITIS OF LEFT HIP: ICD-10-CM

## 2024-06-05 DIAGNOSIS — M16.12 PRIMARY OSTEOARTHRITIS OF LEFT HIP: Primary | ICD-10-CM

## 2024-06-05 DIAGNOSIS — I10 PRIMARY HYPERTENSION: ICD-10-CM

## 2024-06-05 DIAGNOSIS — E66.01 MORBID OBESITY (HCC): ICD-10-CM

## 2024-06-05 DIAGNOSIS — G47.33 OSA ON CPAP: ICD-10-CM

## 2024-06-05 DIAGNOSIS — Z01.818 PRE-OPERATIVE EXAMINATION: ICD-10-CM

## 2024-06-05 LAB
25(OH)D3 SERPL-MCNC: 10.5 NG/ML
ALBUMIN SERPL-MCNC: 4.1 G/DL (ref 3.4–5)
ALBUMIN/GLOB SERPL: 1.2 {RATIO} (ref 1.1–2.2)
ALP SERPL-CCNC: 124 U/L (ref 40–129)
ALT SERPL-CCNC: 38 U/L (ref 10–40)
ANION GAP SERPL CALCULATED.3IONS-SCNC: 11 MMOL/L (ref 3–16)
APTT BLD: 32.1 SEC (ref 22.1–36.4)
AST SERPL-CCNC: 27 U/L (ref 15–37)
BACTERIA URNS QL MICRO: NORMAL /HPF
BASOPHILS # BLD: 0 K/UL (ref 0–0.2)
BASOPHILS NFR BLD: 0.5 %
BILIRUB SERPL-MCNC: 0.5 MG/DL (ref 0–1)
BILIRUB UR QL STRIP.AUTO: NEGATIVE
BUN SERPL-MCNC: 17 MG/DL (ref 7–20)
CALCIUM SERPL-MCNC: 9.5 MG/DL (ref 8.3–10.6)
CHLORIDE SERPL-SCNC: 102 MMOL/L (ref 99–110)
CLARITY UR: CLEAR
CO2 SERPL-SCNC: 27 MMOL/L (ref 21–32)
COLOR UR: YELLOW
CREAT SERPL-MCNC: 0.8 MG/DL (ref 0.8–1.3)
DEPRECATED RDW RBC AUTO: 14.4 % (ref 12.4–15.4)
EOSINOPHIL # BLD: 0.2 K/UL (ref 0–0.6)
EOSINOPHIL NFR BLD: 2.7 %
EPI CELLS #/AREA URNS AUTO: 1 /HPF (ref 0–5)
GFR SERPLBLD CREATININE-BSD FMLA CKD-EPI: >90 ML/MIN/{1.73_M2}
GLUCOSE SERPL-MCNC: 109 MG/DL (ref 70–99)
GLUCOSE UR STRIP.AUTO-MCNC: NEGATIVE MG/DL
HCT VFR BLD AUTO: 36 % (ref 40.5–52.5)
HGB BLD-MCNC: 12.6 G/DL (ref 13.5–17.5)
HGB UR QL STRIP.AUTO: NEGATIVE
HYALINE CASTS #/AREA URNS AUTO: 0 /LPF (ref 0–8)
INR PPP: 1 (ref 0.85–1.15)
KETONES UR STRIP.AUTO-MCNC: NEGATIVE MG/DL
LEUKOCYTE ESTERASE UR QL STRIP.AUTO: NEGATIVE
LYMPHOCYTES # BLD: 1.4 K/UL (ref 1–5.1)
LYMPHOCYTES NFR BLD: 19.5 %
MCH RBC QN AUTO: 34.2 PG (ref 26–34)
MCHC RBC AUTO-ENTMCNC: 34.9 G/DL (ref 31–36)
MCV RBC AUTO: 98.1 FL (ref 80–100)
MONOCYTES # BLD: 0.5 K/UL (ref 0–1.3)
MONOCYTES NFR BLD: 7.5 %
NEUTROPHILS # BLD: 5 K/UL (ref 1.7–7.7)
NEUTROPHILS NFR BLD: 69.8 %
NITRITE UR QL STRIP.AUTO: NEGATIVE
PH UR STRIP.AUTO: 6.5 [PH] (ref 5–8)
PLATELET # BLD AUTO: 284 K/UL (ref 135–450)
PMV BLD AUTO: 8.8 FL (ref 5–10.5)
POTASSIUM SERPL-SCNC: 3.4 MMOL/L (ref 3.5–5.1)
PROT SERPL-MCNC: 7.4 G/DL (ref 6.4–8.2)
PROT UR STRIP.AUTO-MCNC: ABNORMAL MG/DL
PROTHROMBIN TIME: 13.4 SEC (ref 11.9–14.9)
RBC # BLD AUTO: 3.67 M/UL (ref 4.2–5.9)
RBC CLUMPS #/AREA URNS AUTO: 4 /HPF (ref 0–4)
SODIUM SERPL-SCNC: 140 MMOL/L (ref 136–145)
SP GR UR STRIP.AUTO: 1.02 (ref 1–1.03)
TRANSFERRIN SERPL-MCNC: 236 MG/DL (ref 200–360)
UA COMPLETE W REFLEX CULTURE PNL UR: ABNORMAL
UA DIPSTICK W REFLEX MICRO PNL UR: YES
URN SPEC COLLECT METH UR: ABNORMAL
UROBILINOGEN UR STRIP-ACNC: 0.2 E.U./DL
WBC # BLD AUTO: 7.1 K/UL (ref 4–11)
WBC #/AREA URNS AUTO: 0 /HPF (ref 0–5)

## 2024-06-05 PROCEDURE — G8417 CALC BMI ABV UP PARAM F/U: HCPCS | Performed by: FAMILY MEDICINE

## 2024-06-05 PROCEDURE — G8427 DOCREV CUR MEDS BY ELIG CLIN: HCPCS | Performed by: FAMILY MEDICINE

## 2024-06-05 PROCEDURE — 99214 OFFICE O/P EST MOD 30 MIN: CPT | Performed by: FAMILY MEDICINE

## 2024-06-05 PROCEDURE — 3017F COLORECTAL CA SCREEN DOC REV: CPT | Performed by: FAMILY MEDICINE

## 2024-06-05 PROCEDURE — 3079F DIAST BP 80-89 MM HG: CPT | Performed by: FAMILY MEDICINE

## 2024-06-05 PROCEDURE — 1036F TOBACCO NON-USER: CPT | Performed by: FAMILY MEDICINE

## 2024-06-05 PROCEDURE — 3075F SYST BP GE 130 - 139MM HG: CPT | Performed by: FAMILY MEDICINE

## 2024-06-05 NOTE — PROGRESS NOTES
Diagnosis    Primary cardiomyopathy (HCC)    Arthropathy    Morbid obesity (HCC)    DANIELLE on CPAP    H/O: Bell's palsy    Low back pain    Essential hypertension, benign    GERD (gastroesophageal reflux disease)    Osteoarthritis of left knee    Status post total left knee replacement    Primary localized osteoarthritis of right knee    Epididymal cyst    Urinary urgency    Primary osteoarthritis of left hip       Past Surgical History:   Procedure Laterality Date    COLONOSCOPY  02/2017    q 10    GASTRIC BAND  2008    KNEE SURGERY Bilateral 1992 and  2005     TOTAL KNEE ARTHROPLASTY Left 01/2022    True       Family History   Problem Relation Age of Onset    Arthritis Mother     Hearing Loss Mother     High Blood Pressure Mother     Cancer Mother 83        uterine    Hearing Loss Father     Heart Disease Father     High Blood Pressure Father     Stroke Father     Cancer Father 82        pancreas       Social History     Tobacco Use    Smoking status: Never    Smokeless tobacco: Never   Substance Use Topics    Alcohol use: Yes     Alcohol/week: 1.0 standard drink of alcohol     Types: 1 Glasses of wine per week     Comment: occasional       Current Outpatient Medications on File Prior to Visit   Medication Sig Dispense Refill    celecoxib (CELEBREX) 200 MG capsule Take 1 capsule by mouth daily 60 capsule 3    OMEPRAZOLE PO Take by mouth daily      carvedilol (COREG) 6.25 MG tablet TAKE 2 TABLETS BY MOUTH TWICE  DAILY WITH MEALS 360 tablet 3    losartan (COZAAR) 100 MG tablet TAKE 1 TABLET BY MOUTH ONCE DAILY 90 tablet 3    amLODIPine (NORVASC) 10 MG tablet TAKE 1 TABLET BY MOUTH ONCE DAILY 90 tablet 3    Multiple Vitamins-Minerals (THERAPEUTIC MULTIVITAMIN-MINERALS) tablet Take 1 tablet by mouth daily       No current facility-administered medications on file prior to visit.       No Known Allergies    REVIEW OF SYSTEMS:   CONSTITUTIONAL: No weight loss, fever, chills, weakness or fatigue.   HEENT: Eyes: No visual

## 2024-06-06 ENCOUNTER — HOSPITAL ENCOUNTER (OUTPATIENT)
Dept: PHYSICAL THERAPY | Age: 59
Setting detail: THERAPIES SERIES
End: 2024-06-06
Payer: COMMERCIAL

## 2024-06-06 LAB
EST. AVERAGE GLUCOSE BLD GHB EST-MCNC: 99.7 MG/DL
HBA1C MFR BLD: 5.1 %

## 2024-06-07 ENCOUNTER — TELEPHONE (OUTPATIENT)
Dept: ORTHOPEDIC SURGERY | Age: 59
End: 2024-06-07

## 2024-06-07 ENCOUNTER — OFFICE VISIT (OUTPATIENT)
Dept: ORTHOPEDIC SURGERY | Age: 59
End: 2024-06-07

## 2024-06-07 VITALS — HEIGHT: 74 IN | WEIGHT: 315 LBS | BODY MASS INDEX: 40.43 KG/M2

## 2024-06-07 DIAGNOSIS — M19.012 PRIMARY OSTEOARTHRITIS OF LEFT SHOULDER: ICD-10-CM

## 2024-06-07 DIAGNOSIS — M19.011 PRIMARY OSTEOARTHRITIS OF BOTH SHOULDERS: Primary | ICD-10-CM

## 2024-06-07 DIAGNOSIS — M19.012 PRIMARY OSTEOARTHRITIS OF BOTH SHOULDERS: Primary | ICD-10-CM

## 2024-06-07 RX ORDER — METHYLPREDNISOLONE ACETATE 40 MG/ML
40 INJECTION, SUSPENSION INTRA-ARTICULAR; INTRALESIONAL; INTRAMUSCULAR; SOFT TISSUE ONCE
Status: COMPLETED | OUTPATIENT
Start: 2024-06-07 | End: 2024-06-07

## 2024-06-07 RX ADMIN — METHYLPREDNISOLONE ACETATE 80 MG: 40 INJECTION, SUSPENSION INTRA-ARTICULAR; INTRALESIONAL; INTRAMUSCULAR; SOFT TISSUE at 13:03

## 2024-06-07 RX ADMIN — Medication 5 ML: at 13:05

## 2024-06-07 NOTE — PROGRESS NOTES
Chief Complaint    Follow-up (Bilateral shoulders )      History of Present Illness:  Mekhi Robles is a 59 y.o. male here today for follow up evaluation of the bilateral shoulder. Patient has known glenohumeral arthritis bilaterally. He was referred to formal, supervised physical therapy and provided a prescription of Celebrex. He has not noticed a difference in pain and continues to complain of pain with limited range of motion. The pain does keep him up at night. Denies any new injuries.    Medical History:  Patient's medications, allergies, past medical, surgical, social and family histories were reviewed and updated as appropriate.    Pertinent items are noted in HPI  Review of systems reviewed from Patient History Form completed today and available in the patient's chart under the Media tab.       Pain Assessment  Location of Pain: Shoulder  Location Modifiers: Left, Right  Severity of Pain: 7  Quality of Pain: Sharp, Dull  Duration of Pain: Persistent  Frequency of Pain: Constant  Aggravating Factors:  (lifting)  Limiting Behavior: Some  Relieving Factors: Rest  Result of Injury: No  Work-Related Injury: No  Are there other pain locations you wish to document?: No    Past Medical History:   Diagnosis Date    Arthritis     L hip, R hip ,jey shoulders    Chest pain 01/2011    [facility]--neg labs and stress echo    Essential hypertension, benign     GERD (gastroesophageal reflux disease)     H/O: Bell's palsy 2006    occ has sx    Morbid obesity (HCC)     DANIELLE on CPAP     Primary cardiomyopathy (HCC)     Unspecified sleep apnea     Wears glasses         Past Surgical History:   Procedure Laterality Date    COLONOSCOPY  02/2017    q 10    DENTAL SURGERY      GASTRIC BAND  2008    KNEE SURGERY Bilateral 1992 and  2005     TOTAL KNEE ARTHROPLASTY Bilateral 01/2022    True       Family History   Problem Relation Age of Onset    Arthritis Mother     Hearing Loss Mother     High Blood Pressure Mother     Cancer Mother 83

## 2024-06-07 NOTE — TELEPHONE ENCOUNTER
ORTHOPAEDIC NURSE NAVIGATOR SUMMARY NOTE    Surgery cancelled at this time due to cardiac clearance.   Anticipated Date of Surgery: 6/13/24      PCP: Suleman Navarro MD   Phone #: [phone]    Date of PCP Visit for H&P: 6/5/24    Any Noted Concerns from PCP prior to surgery:  No   If Yes, what concerns?:    IS THE PATIENT IN A PAIN MANAGEMENT PROGRAM?:   No     Review of Past Medical History Reveals History of:      Critical Lab Values:   Hgb/Hct:   Hemoglobin (g/dL)   Date Value   06/05/2024 12.6 (L)   /  Hematocrit (%)   Date Value   06/05/2024 36.0 (L)      HgbA1C:    Lab Results   Component Value Date    LABA1C 5.1 06/05/2024    LABA1C 4.5 10/23/2019    LABA1C 4.8 11/10/2018      Albumin:  No results found for: \"LABALBU\"   BUN/Cr:   BUN (mg/dL)   Date Value   06/05/2024 17   /  Creatinine (mg/dL)   Date Value   06/05/2024 0.8      BMI:    BMI Readings from Last 1 Encounters:   06/07/24 46.22 kg/m²        Coronary Artery Disease/HTN/CHF History: Yes- HTN         -On any anticoagulation-none       Diabetes History: No       Pulmonary: COPD/Emphysema/ Use of home oxygen: none     Alcohol use:        DVT Risk Stratification:  Low       -Smoking history or use of estrogen-         BMI Greater than 40 at time of scheduling?: Yes    Has Surgeon been notified of BMI concern? Yes    Weight Loss Clinic Consult Ordered: No    Date of Wt Loss Clinic Appt:     BMI at time of surgery (if went through Wt Mgmt):      Additional Medical Concerns:         Discharge Disposition Information:     Attended Pre-Hab Program: no     Anticipated Discharge Disposition:  Home with [facility]   Who will be with patient at home following discharge?  Wife and children       Equipment pt already has:  Walker   Bedroom on first or second floor: first   Bathroom on first or second floor: first   Weight bearing status: full   Pre-op ambulatory status: none   Number of entry steps: 3 steps   Caregiver assistance: full time    Pt plan to DC same

## 2024-06-08 LAB — MRSA SPEC QL CULT: NORMAL

## 2024-06-09 LAB
EKG ATRIAL RATE: 80 BPM
EKG DIAGNOSIS: NORMAL
EKG P AXIS: 63 DEGREES
EKG P-R INTERVAL: 174 MS
EKG Q-T INTERVAL: 390 MS
EKG QRS DURATION: 98 MS
EKG QTC CALCULATION (BAZETT): 449 MS
EKG R AXIS: 65 DEGREES
EKG T AXIS: -35 DEGREES
EKG VENTRICULAR RATE: 80 BPM

## 2024-06-10 ENCOUNTER — OFFICE VISIT (OUTPATIENT)
Dept: ORTHOPEDIC SURGERY | Age: 59
End: 2024-06-10
Payer: COMMERCIAL

## 2024-06-10 VITALS — BODY MASS INDEX: 40.43 KG/M2 | HEIGHT: 74 IN | WEIGHT: 315 LBS

## 2024-06-10 DIAGNOSIS — M19.012 PRIMARY OSTEOARTHRITIS OF LEFT SHOULDER: ICD-10-CM

## 2024-06-10 DIAGNOSIS — M19.011 PRIMARY OSTEOARTHRITIS OF RIGHT SHOULDER: Primary | ICD-10-CM

## 2024-06-10 PROCEDURE — 1036F TOBACCO NON-USER: CPT | Performed by: ORTHOPAEDIC SURGERY

## 2024-06-10 PROCEDURE — 99213 OFFICE O/P EST LOW 20 MIN: CPT | Performed by: ORTHOPAEDIC SURGERY

## 2024-06-10 PROCEDURE — G8427 DOCREV CUR MEDS BY ELIG CLIN: HCPCS | Performed by: ORTHOPAEDIC SURGERY

## 2024-06-10 PROCEDURE — G8417 CALC BMI ABV UP PARAM F/U: HCPCS | Performed by: ORTHOPAEDIC SURGERY

## 2024-06-10 PROCEDURE — 3017F COLORECTAL CA SCREEN DOC REV: CPT | Performed by: ORTHOPAEDIC SURGERY

## 2024-06-10 RX ORDER — LIDOCAINE HYDROCHLORIDE 10 MG/ML
4 INJECTION, SOLUTION INFILTRATION; PERINEURAL ONCE
Status: CANCELLED | OUTPATIENT
Start: 2024-06-10 | End: 2024-06-10

## 2024-06-10 RX ORDER — METHYLPREDNISOLONE ACETATE 40 MG/ML
80 INJECTION, SUSPENSION INTRA-ARTICULAR; INTRALESIONAL; INTRAMUSCULAR; SOFT TISSUE ONCE
Status: CANCELLED | OUTPATIENT
Start: 2024-06-10 | End: 2024-06-10

## 2024-06-10 RX ORDER — AMLODIPINE BESYLATE 10 MG/1
10 TABLET ORAL DAILY
Qty: 90 TABLET | Refills: 3 | Status: SHIPPED | OUTPATIENT
Start: 2024-06-10

## 2024-06-10 RX ORDER — LOSARTAN POTASSIUM 100 MG/1
100 TABLET ORAL DAILY
Qty: 90 TABLET | Refills: 3 | Status: SHIPPED | OUTPATIENT
Start: 2024-06-10

## 2024-06-10 NOTE — PROGRESS NOTES
History   Problem Relation Age of Onset    Arthritis Mother     Hearing Loss Mother     High Blood Pressure Mother     Cancer Mother 83        uterine    Hearing Loss Father     Heart Disease Father     High Blood Pressure Father     Stroke Father     Cancer Father 82        pancreas       Social History     Socioeconomic History    Marital status:      Spouse name: None    Number of children: 2    Years of education: None    Highest education level: None   Occupational History    Occupation:  at [employer]   Tobacco Use    Smoking status: Never    Smokeless tobacco: Never   Vaping Use    Vaping Use: Never used   Substance and Sexual Activity    Alcohol use: Yes     Alcohol/week: 1.0 standard drink of alcohol     Types: 1 Glasses of wine per week     Comment: occasional    Drug use: No    Sexual activity: Yes     Partners: Female   Social History Narrative    Exercise--no    + seatbelts    Happily --mom lives with them--2 kids and 1 kid with [employer]    Works>50--;lots of stress but good support     Social Determinants of Health     Financial Resource Strain: Low Risk  (5/1/2024)    Overall Financial Resource Strain (CARDIA)     Difficulty of Paying Living Expenses: Not hard at all   Food Insecurity: No Food Insecurity (5/1/2024)    Hunger Vital Sign     Worried About Running Out of Food in the Last Year: Never true     Ran Out of Food in the Last Year: Never true   Transportation Needs: Unknown (5/1/2024)    PRAPARE - Transportation     Lack of Transportation (Non-Medical): No   Housing Stability: Unknown (5/1/2024)    Housing Stability Vital Sign     Unstable Housing in the Last Year: No       Current Outpatient Medications   Medication Sig Dispense Refill    losartan (COZAAR) 100 MG tablet Take 1 tablet by mouth daily 90 tablet 3    amLODIPine (NORVASC) 10 MG tablet Take 1 tablet by mouth daily 90 tablet 3    acetaminophen (TYLENOL) 500 MG tablet Take 2 tablets by mouth

## 2024-06-11 ENCOUNTER — TELEPHONE (OUTPATIENT)
Dept: FAMILY MEDICINE CLINIC | Age: 59
End: 2024-06-11

## 2024-06-11 NOTE — TELEPHONE ENCOUNTER
Pt has surgery 06/13/2024. Due to abnormal EKG he needs cardiac clearance. Pt saw  on 6/5/2024. Please help pt scheduled with cardiology to get clearance and anything else that would entail.

## 2024-06-11 NOTE — TELEPHONE ENCOUNTER
Pt is supposed to have surgery on Thursday. Is he supposed to see this dr before. Would like to speak to Dr. Melanie LAIRD in regards to this issue.

## 2024-06-12 ENCOUNTER — TELEPHONE (OUTPATIENT)
Dept: ORTHOPEDIC SURGERY | Age: 59
End: 2024-06-12

## 2024-06-12 NOTE — TELEPHONE ENCOUNTER
Discussed with pt and cardiology--will put off surgery for now and see Dr Belcher this week--will likely need GXT

## 2024-06-14 ENCOUNTER — OFFICE VISIT (OUTPATIENT)
Dept: CARDIOLOGY CLINIC | Age: 59
End: 2024-06-14
Payer: COMMERCIAL

## 2024-06-14 VITALS
SYSTOLIC BLOOD PRESSURE: 154 MMHG | DIASTOLIC BLOOD PRESSURE: 80 MMHG | WEIGHT: 315 LBS | BODY MASS INDEX: 48.15 KG/M2 | HEART RATE: 60 BPM

## 2024-06-14 DIAGNOSIS — R94.31 ABNORMAL EKG: ICD-10-CM

## 2024-06-14 DIAGNOSIS — Z01.810 PRE-OPERATIVE CARDIOVASCULAR EXAMINATION: ICD-10-CM

## 2024-06-14 DIAGNOSIS — Z01.818 PRE-OP EVALUATION: Primary | ICD-10-CM

## 2024-06-14 PROCEDURE — 3017F COLORECTAL CA SCREEN DOC REV: CPT | Performed by: INTERNAL MEDICINE

## 2024-06-14 PROCEDURE — 99204 OFFICE O/P NEW MOD 45 MIN: CPT | Performed by: INTERNAL MEDICINE

## 2024-06-14 PROCEDURE — 3079F DIAST BP 80-89 MM HG: CPT | Performed by: INTERNAL MEDICINE

## 2024-06-14 PROCEDURE — 3077F SYST BP >= 140 MM HG: CPT | Performed by: INTERNAL MEDICINE

## 2024-06-14 PROCEDURE — G8417 CALC BMI ABV UP PARAM F/U: HCPCS | Performed by: INTERNAL MEDICINE

## 2024-06-14 PROCEDURE — G8427 DOCREV CUR MEDS BY ELIG CLIN: HCPCS | Performed by: INTERNAL MEDICINE

## 2024-06-14 PROCEDURE — 1036F TOBACCO NON-USER: CPT | Performed by: INTERNAL MEDICINE

## 2024-06-14 NOTE — PROGRESS NOTES
Running Out of Food in the Last Year: Never true     Ran Out of Food in the Last Year: Never true   Transportation Needs: Unknown (5/1/2024)    PRAPARE - Transportation     Lack of Transportation (Medical): Not on file     Lack of Transportation (Non-Medical): No   Physical Activity: Not on file   Stress: Not on file   Social Connections: Not on file   Intimate Partner Violence: Not on file   Housing Stability: Unknown (5/1/2024)    Housing Stability Vital Sign     Unable to Pay for Housing in the Last Year: Not on file     Number of Places Lived in the Last Year: Not on file     Unstable Housing in the Last Year: No     FH reviewed, HTN, Fa with Mi, 60s    Vitals:    06/14/24 0956   BP: (!) 154/80   Pulse: 60     Wt 375      Review of Systems   Constitutional:  Negative for activity change and fatigue.   Respiratory:  Negative for apnea, cough, choking, chest tightness and shortness of breath.    Cardiovascular:  Negative for chest pain, palpitations and leg swelling.        No PND or orthopnea.  No tachycardia.   Gastrointestinal:  Negative for abdominal distention.   Musculoskeletal:  Negative for myalgias.   Neurological:  Negative for dizziness, syncope and light-headedness.   Psychiatric/Behavioral:  Negative for agitation and behavioral problems.    All other systems reviewed and are negative.         Objective   Physical Exam  Constitutional:       General: He is not in acute distress.     Appearance: Normal appearance. He is well-developed. He is obese.   HENT:      Head: Normocephalic and atraumatic.      Right Ear: External ear normal.      Left Ear: External ear normal.      Nose: Nose normal.   Neck:      Vascular: No JVD.   Cardiovascular:      Rate and Rhythm: Normal rate and regular rhythm.      Heart sounds: Normal heart sounds.      No gallop.   Pulmonary:      Effort: Pulmonary effort is normal. No respiratory distress.      Breath sounds: Normal breath sounds. No wheezing or rales.   Abdominal:

## 2024-06-17 ENCOUNTER — TELEPHONE (OUTPATIENT)
Dept: ORTHOPEDIC SURGERY | Age: 59
End: 2024-06-17

## 2024-06-17 NOTE — TELEPHONE ENCOUNTER
Error encounter, surgery cancelled at this time.   Julia Ruff   Orthopedic Nurse Navigator   Phone number: [phone]

## 2024-06-19 ENCOUNTER — HOSPITAL ENCOUNTER (OUTPATIENT)
Age: 59
Discharge: HOME | End: 2024-06-19
Attending: INTERNAL MEDICINE
Payer: COMMERCIAL

## 2024-06-19 ENCOUNTER — HOSPITAL ENCOUNTER (OUTPATIENT)
Age: 59
Discharge: HOME | End: 2024-06-21
Attending: INTERNAL MEDICINE
Payer: COMMERCIAL

## 2024-06-19 VITALS — BODY MASS INDEX: 40.43 KG/M2 | WEIGHT: 315 LBS | HEIGHT: 74 IN

## 2024-06-19 DIAGNOSIS — Z01.810 PRE-OPERATIVE CARDIOVASCULAR EXAMINATION: ICD-10-CM

## 2024-06-19 DIAGNOSIS — R94.31 ABNORMAL EKG: ICD-10-CM

## 2024-06-19 DIAGNOSIS — Z01.818 PRE-OP EVALUATION: ICD-10-CM

## 2024-06-19 PROCEDURE — 6360000002 HC RX W HCPCS: Performed by: INTERNAL MEDICINE

## 2024-06-19 PROCEDURE — 93017 CV STRESS TEST TRACING ONLY: CPT

## 2024-06-19 PROCEDURE — 78452 HT MUSCLE IMAGE SPECT MULT: CPT

## 2024-06-19 PROCEDURE — A9502 TC99M TETROFOSMIN: HCPCS | Performed by: INTERNAL MEDICINE

## 2024-06-19 PROCEDURE — 3430000000 HC RX DIAGNOSTIC RADIOPHARMACEUTICAL: Performed by: INTERNAL MEDICINE

## 2024-06-19 RX ORDER — REGADENOSON 0.08 MG/ML
0.4 INJECTION, SOLUTION INTRAVENOUS
Status: COMPLETED | OUTPATIENT
Start: 2024-06-19 | End: 2024-06-19

## 2024-06-19 RX ADMIN — TETROFOSMIN 36 MILLICURIE: 1.38 INJECTION, POWDER, LYOPHILIZED, FOR SOLUTION INTRAVENOUS at 10:00

## 2024-06-19 RX ADMIN — REGADENOSON 0.4 MG: 0.08 INJECTION, SOLUTION INTRAVENOUS at 10:00

## 2024-06-19 NOTE — NURSING NOTE
Pt completed stress portion of cardiac stress test. Patient complained of SOB and \" strange smell\" that resolved. Pt is discharged to nuclear department for final scan. Nuclear tech will remove PIV. Discharge instructions given to pt. Pt will be a two day stress test and instructed to return tomorrow by nuclear medicine Pt verbalizes understanding to discharge instructions.

## 2024-06-19 NOTE — DISCHARGE INSTR - COC
Continuity of Care Form    Patient Name: Mekhi Robles   :  1965  MRN:  [id number]    Admit date:  2024  Discharge date:  ***    Code Status Order: No Order   Advance Directives:     Admitting Physician:  No admitting provider for patient encounter.  PCP: Suleman Navarro MD    Discharging Nurse: ***  Discharging Hospital Unit/Room#: No information available for this encounter.  Discharging Unit Phone Number: ***    Emergency Contact:   Extended Emergency Contact Information  Primary Emergency Contact: Nikki Robles  Address: [address] [place] [address]  Home Phone: [phone]  Relation: Spouse  Secondary Emergency Contact: Nikki Robles  Address: [address] [place] [address]  Home Phone: [phone]  Relation: Spouse    Past Surgical History:  Past Surgical History:   Procedure Laterality Date    COLONOSCOPY  2017    q 10    DENTAL SURGERY      GASTRIC BAND  2008    KNEE SURGERY Bilateral  and       TOTAL KNEE ARTHROPLASTY Bilateral 2022    True       Immunization History:   Immunization History   Administered Date(s) Administered    COVID-19, MODERNA BLUE border, Primary or Immunocompromised, (age 12y+), IM, 100 mcg/0.5mL 2021, 04/15/2021, 2021    COVID-19, MODERNA Bivalent, (age 12y+), IM, 50 mcg/0.5 mL 2022    COVID-19, MODERNA, ( formula), (age 12y+), IM, 50mcg/0.5mL 2024    Influenza Vaccine, unspecified formulation 10/24/2018    Influenza Virus Vaccine 10/16/2015, 10/16/2019    Influenza, FLUCELVAX, (age 6 mo+), MDCK, PF, 0.5mL 2021    Pneumococcal, PPSV23, PNEUMOVAX 23, (age 2y+), SC/IM, 0.5mL 10/16/2015    TDaP, ADACEL (age 10y-64y), BOOSTRIX (age 10y+), IM, 0.5mL 2011, 2021    Td, unspecified formulation 2004       Active Problems:  Patient Active Problem List   Diagnosis Code    Primary cardiomyopathy (HCC) I42.9    Arthropathy

## 2024-06-20 ENCOUNTER — HOSPITAL ENCOUNTER (OUTPATIENT)
Age: 59
Discharge: HOME | End: 2024-06-20
Attending: INTERNAL MEDICINE
Payer: COMMERCIAL

## 2024-06-20 ENCOUNTER — ANESTHESIA EVENT (OUTPATIENT)
Dept: OPERATING ROOM | Age: 59
End: 2024-06-20
Payer: COMMERCIAL

## 2024-06-20 LAB
ECHO BSA: 2.98 M2
NUC STRESS EJECTION FRACTION: 45 %
NUC STRESS LV EDV: 213 ML (ref 67–155)
NUC STRESS LV ESV: 126 ML (ref 22–58)
NUC STRESS LV MASS: 187 G
NUC STRESS LV STROKE VOLUME: 87 ML
STRESS BASELINE DIAS BP: 93 MMHG
STRESS BASELINE HR: 79 BPM
STRESS BASELINE SYS BP: 144 MMHG
STRESS ESTIMATED WORKLOAD: 1 METS
STRESS PEAK DIAS BP: 91 MMHG
STRESS PEAK SYS BP: 171 MMHG
STRESS PERCENT HR ACHIEVED: 64 %
STRESS POST PEAK HR: 103 BPM
STRESS RATE PRESSURE PRODUCT: ABNORMAL BPM*MMHG
STRESS TARGET HR: 160 BPM

## 2024-06-20 PROCEDURE — A9502 TC99M TETROFOSMIN: HCPCS | Performed by: INTERNAL MEDICINE

## 2024-06-20 PROCEDURE — 3430000000 HC RX DIAGNOSTIC RADIOPHARMACEUTICAL: Performed by: INTERNAL MEDICINE

## 2024-06-20 RX ADMIN — TETROFOSMIN 33.8 MILLICURIE: 1.38 INJECTION, POWDER, LYOPHILIZED, FOR SOLUTION INTRAVENOUS at 08:00

## 2024-06-21 RX ORDER — ACETAMINOPHEN 325 MG/1
1000 TABLET ORAL ONCE
Status: CANCELLED | OUTPATIENT
Start: 2024-06-21 | End: 2024-06-21

## 2024-06-21 RX ORDER — SODIUM CHLORIDE 9 MG/ML
INJECTION, SOLUTION INTRAVENOUS PRN
Status: CANCELLED | OUTPATIENT
Start: 2024-06-21

## 2024-06-21 RX ORDER — SODIUM CHLORIDE 0.9 % (FLUSH) 0.9 %
5-40 SYRINGE (ML) INJECTION PRN
Status: CANCELLED | OUTPATIENT
Start: 2024-06-21

## 2024-06-21 RX ORDER — MELOXICAM 7.5 MG/1
7.5 TABLET ORAL ONCE
Status: CANCELLED | OUTPATIENT
Start: 2024-06-21 | End: 2024-06-21

## 2024-06-21 RX ORDER — SODIUM CHLORIDE 0.9 % (FLUSH) 0.9 %
5-40 SYRINGE (ML) INJECTION EVERY 12 HOURS SCHEDULED
Status: CANCELLED | OUTPATIENT
Start: 2024-06-21

## 2024-06-21 NOTE — PROGRESS NOTES
Mekhi Robles    Age 59 y.o.    male    1965    MRN [id number]    6/27/2024  Arrival Time_____________  OR Time____________140 Min     Procedure(s):  LEFT TOTAL HIP ARTHROPLASTY, ANTERIOR APPROACH                      General   Surgeon(s):  Abram Alvarado, MD      DAY ADMIT ___  SDS/OP ___  OUTPT IN BED ___        Phone [phone] (home) [phone] (work)                 PCP _____________________ Phone_________________ Epic ( ) Epic CE ( ) Appt ________    NOTES: _________________________________________ Consult/Cardio _______________    ____________________________________________________________________________    ____________________________________________________________________________  PAT APPT DATE:________ TIME: ________  FAXED QAD: _______  (__) H&P w/ Hospitalist    (__) PAT orders in EPIC    (__) Meet with PAT nurse  __________________________________________________________________________  Preop Nurse phone screen complete: _____________  (__) CBC     (__) W/ DIFF ___________  (__) CT CHEST  __________   (__) Hgb A1C    ___________  (__) CHEST X RAY   __________  (__) LIPID PROFILE  ___________  (__) EKG   __________  (__) PT-INR / APTT  ___________  (__) PFT's   __________  (__) BMP   ___________  (__) CAROTIDS  __________  (__) CMP   ___________  (__) VEIN MAPPING  __________  (__) U/A   ___________  ( X ) HISTORY & PHYSICAL __________  (__) URINE C & S  ___________  (__) CARDIAC CLEARANCE __________  (__) U/A W/ FLEX  ___________  (__) PULM. CLEARANCE __________  (__) SERUM PREGNANCY ___________  (__) Preop Orders in EPIC __________  (__) TYPE & SCREEN __________repeat ( ) (__)  __________________ __________  (__) Albumin   ___________  (__)  __________________ __________  (__) TRANSFERRIN  ___________  (__)  __________________ __________  (__) LIVER PROFILE  ___________  (__) URINE PREG DOS __________  (__) MRSA NASAL SWAB ___________  (__) BLOOD SUGAR DOS __________  (__) SED

## 2024-06-24 NOTE — PROGRESS NOTES
Surgery Date and Time: 6/27/24 12:00 pm    Arrival Time: 10:00 am    The instructions given when and if a patient needs to stop oral intake prior to surgery varies. Follow the instructions you were given by your    Surgeon or RN during the Pre-op call.       __X__Nothing to eat or to drink after Midnight the night before the surgery. NO gum, mints, candy or ice chips day of surgery.                   __X___ Carbo-loading or ENHANCED RECOVERY instructions will be given to select patients.  Please do the following:     The evening before your surgery after dinner before midnight drink 40 ounces (2 - 20 ounce bottles) of Gatorade. If you are diabetic use sugar free.                       Only take the following medications with a small sip of water the morning of surgery:carvedilol and amlodipine                 Hold the following medications (per anesthesia or surgeon request): losartan          Last Dose: 6/26; take by 12 noon. To be held 24 hours prior to surgery      Aspirin, Ibuprofen, Advil, Naproxen, Vitamin E and other Anti-inflammatory products and supplements should be stopped for 5 -7days before surgery      or as directed by your physician.  May take celebrex but stop voltaren ointment    Check with your Surgeon/PCP/Cardiologist regarding stopping Plavix, Coumadin, Eliquis, Lovenox, Effient, Pradaxa, Xarelto, Fragmin or other blood thinners     and follow their instructions.  Medication:  N/A               Last dose:                - If you take a long acting-insulin, please ask your Primary Care Physician if you should decrease your dose the night before surgery.    - Do not smoke or vape, and do not drink any alcoholic beverages 24 hours prior to surgery, this includes NA Beer. Refrain from using any recreational drugs,     including non-prescribed prescription drugs.     -You may brush your teeth and gargle the morning of surgery.  DO NOT SWALLOW WATER.    -You MUST plan for a responsible adult to stay

## 2024-06-25 ENCOUNTER — TELEPHONE (OUTPATIENT)
Dept: FAMILY MEDICINE CLINIC | Age: 59
End: 2024-06-25

## 2024-06-25 NOTE — TELEPHONE ENCOUNTER
Pt would like a call to speak to Dr. Navarro regarding his surgery this Thursday and the stress test he got done last week

## 2024-06-27 ENCOUNTER — APPOINTMENT (OUTPATIENT)
Dept: GENERAL RADIOLOGY | Age: 59
End: 2024-06-27
Attending: STUDENT IN AN ORGANIZED HEALTH CARE EDUCATION/TRAINING PROGRAM
Payer: COMMERCIAL

## 2024-06-27 ENCOUNTER — HOSPITAL ENCOUNTER (OUTPATIENT)
Age: 59
Setting detail: OBSERVATION
Discharge: HOME | End: 2024-06-29
Attending: STUDENT IN AN ORGANIZED HEALTH CARE EDUCATION/TRAINING PROGRAM | Admitting: STUDENT IN AN ORGANIZED HEALTH CARE EDUCATION/TRAINING PROGRAM
Payer: COMMERCIAL

## 2024-06-27 ENCOUNTER — ANESTHESIA (OUTPATIENT)
Dept: OPERATING ROOM | Age: 59
End: 2024-06-27
Payer: COMMERCIAL

## 2024-06-27 DIAGNOSIS — M19.011 PRIMARY OSTEOARTHRITIS OF BOTH SHOULDERS: ICD-10-CM

## 2024-06-27 DIAGNOSIS — M19.012 PRIMARY OSTEOARTHRITIS OF BOTH SHOULDERS: ICD-10-CM

## 2024-06-27 DIAGNOSIS — Z96.642 S/P TOTAL LEFT HIP ARTHROPLASTY: Primary | ICD-10-CM

## 2024-06-27 LAB
ABO + RH BLD: NORMAL
ANTIBODY SCREEN: NORMAL
BLD GP AB SCN SERPL QL: NORMAL
GLUCOSE BLD-MCNC: 133 MG/DL (ref 70–99)
GLUCOSE BLD-MCNC: 136 MG/DL (ref 70–99)
PERFORMED ON: ABNORMAL
PERFORMED ON: ABNORMAL

## 2024-06-27 PROCEDURE — 3700000000 HC ANESTHESIA ATTENDED CARE: Performed by: STUDENT IN AN ORGANIZED HEALTH CARE EDUCATION/TRAINING PROGRAM

## 2024-06-27 PROCEDURE — 2709999900 HC NON-CHARGEABLE SUPPLY: Performed by: STUDENT IN AN ORGANIZED HEALTH CARE EDUCATION/TRAINING PROGRAM

## 2024-06-27 PROCEDURE — 6360000002 HC RX W HCPCS: Performed by: STUDENT IN AN ORGANIZED HEALTH CARE EDUCATION/TRAINING PROGRAM

## 2024-06-27 PROCEDURE — 2500000003 HC RX 250 WO HCPCS: Performed by: NURSE ANESTHETIST, CERTIFIED REGISTERED

## 2024-06-27 PROCEDURE — 6360000002 HC RX W HCPCS: Performed by: NURSE ANESTHETIST, CERTIFIED REGISTERED

## 2024-06-27 PROCEDURE — 3600000005 HC SURGERY LEVEL 5 BASE: Performed by: STUDENT IN AN ORGANIZED HEALTH CARE EDUCATION/TRAINING PROGRAM

## 2024-06-27 PROCEDURE — 86850 RBC ANTIBODY SCREEN: CPT

## 2024-06-27 PROCEDURE — A4217 STERILE WATER/SALINE, 500 ML: HCPCS | Performed by: STUDENT IN AN ORGANIZED HEALTH CARE EDUCATION/TRAINING PROGRAM

## 2024-06-27 PROCEDURE — 6360000002 HC RX W HCPCS: Performed by: ANESTHESIOLOGY

## 2024-06-27 PROCEDURE — 94761 N-INVAS EAR/PLS OXIMETRY MLT: CPT

## 2024-06-27 PROCEDURE — 86901 BLOOD TYPING SEROLOGIC RH(D): CPT

## 2024-06-27 PROCEDURE — 72170 X-RAY EXAM OF PELVIS: CPT

## 2024-06-27 PROCEDURE — 3600000015 HC SURGERY LEVEL 5 ADDTL 15MIN: Performed by: STUDENT IN AN ORGANIZED HEALTH CARE EDUCATION/TRAINING PROGRAM

## 2024-06-27 PROCEDURE — 6370000000 HC RX 637 (ALT 250 FOR IP): Performed by: ORTHOPAEDIC SURGERY

## 2024-06-27 PROCEDURE — 73502 X-RAY EXAM HIP UNI 2-3 VIEWS: CPT

## 2024-06-27 PROCEDURE — 86900 BLOOD TYPING SEROLOGIC ABO: CPT

## 2024-06-27 PROCEDURE — 3700000001 HC ADD 15 MINUTES (ANESTHESIA): Performed by: STUDENT IN AN ORGANIZED HEALTH CARE EDUCATION/TRAINING PROGRAM

## 2024-06-27 PROCEDURE — G0378 HOSPITAL OBSERVATION PER HR: HCPCS

## 2024-06-27 PROCEDURE — 6370000000 HC RX 637 (ALT 250 FOR IP): Performed by: ANESTHESIOLOGY

## 2024-06-27 PROCEDURE — 2580000003 HC RX 258: Performed by: NURSE ANESTHETIST, CERTIFIED REGISTERED

## 2024-06-27 PROCEDURE — 7100000000 HC PACU RECOVERY - FIRST 15 MIN: Performed by: STUDENT IN AN ORGANIZED HEALTH CARE EDUCATION/TRAINING PROGRAM

## 2024-06-27 PROCEDURE — C1776 JOINT DEVICE (IMPLANTABLE): HCPCS | Performed by: STUDENT IN AN ORGANIZED HEALTH CARE EDUCATION/TRAINING PROGRAM

## 2024-06-27 PROCEDURE — 27130 TOTAL HIP ARTHROPLASTY: CPT | Performed by: STUDENT IN AN ORGANIZED HEALTH CARE EDUCATION/TRAINING PROGRAM

## 2024-06-27 PROCEDURE — 2580000003 HC RX 258: Performed by: STUDENT IN AN ORGANIZED HEALTH CARE EDUCATION/TRAINING PROGRAM

## 2024-06-27 PROCEDURE — 7100000001 HC PACU RECOVERY - ADDTL 15 MIN: Performed by: STUDENT IN AN ORGANIZED HEALTH CARE EDUCATION/TRAINING PROGRAM

## 2024-06-27 PROCEDURE — 2700000000 HC OXYGEN THERAPY PER DAY

## 2024-06-27 PROCEDURE — 2720000010 HC SURG SUPPLY STERILE: Performed by: STUDENT IN AN ORGANIZED HEALTH CARE EDUCATION/TRAINING PROGRAM

## 2024-06-27 PROCEDURE — 2500000003 HC RX 250 WO HCPCS: Performed by: STUDENT IN AN ORGANIZED HEALTH CARE EDUCATION/TRAINING PROGRAM

## 2024-06-27 PROCEDURE — 6370000000 HC RX 637 (ALT 250 FOR IP): Performed by: STUDENT IN AN ORGANIZED HEALTH CARE EDUCATION/TRAINING PROGRAM

## 2024-06-27 DEVICE — STEM FEM STD OFFSET 5 HIP HA AVENIR COMPLETE: Type: IMPLANTABLE DEVICE | Site: HIP | Status: FUNCTIONAL

## 2024-06-27 DEVICE — IMPLANTABLE DEVICE
Type: IMPLANTABLE DEVICE | Site: HIP | Status: FUNCTIONAL
Brand: G7® ACETABULAR SYSTEM

## 2024-06-27 DEVICE — BIOLOX® DELTA, CERAMIC FEMORAL HEAD, S, Ø 40/-3.5, TAPER 12/14
Type: IMPLANTABLE DEVICE | Site: HIP | Status: FUNCTIONAL
Brand: BIOLOX® DELTA

## 2024-06-27 DEVICE — IMPLANTABLE DEVICE
Type: IMPLANTABLE DEVICE | Site: HIP | Status: FUNCTIONAL
Brand: G7® VIVACIT-E®

## 2024-06-27 RX ORDER — SODIUM CHLORIDE 0.9 % (FLUSH) 0.9 %
5-40 SYRINGE (ML) INJECTION PRN
Status: DISCONTINUED | OUTPATIENT
Start: 2024-06-27 | End: 2024-06-27 | Stop reason: HOSPADM

## 2024-06-27 RX ORDER — M-VIT,TX,IRON,MINS/CALC/FOLIC 27MG-0.4MG
1 TABLET ORAL DAILY
Status: DISCONTINUED | OUTPATIENT
Start: 2024-06-27 | End: 2024-06-29 | Stop reason: HOSPADM

## 2024-06-27 RX ORDER — SODIUM CHLORIDE 0.9 % (FLUSH) 0.9 %
5-40 SYRINGE (ML) INJECTION EVERY 12 HOURS SCHEDULED
Status: DISCONTINUED | OUTPATIENT
Start: 2024-06-27 | End: 2024-06-27 | Stop reason: HOSPADM

## 2024-06-27 RX ORDER — SODIUM CHLORIDE 9 MG/ML
INJECTION, SOLUTION INTRAVENOUS CONTINUOUS
Status: DISCONTINUED | OUTPATIENT
Start: 2024-06-27 | End: 2024-06-29

## 2024-06-27 RX ORDER — MORPHINE SULFATE 2 MG/ML
2 INJECTION, SOLUTION INTRAMUSCULAR; INTRAVENOUS
Status: DISCONTINUED | OUTPATIENT
Start: 2024-06-27 | End: 2024-06-29 | Stop reason: HOSPADM

## 2024-06-27 RX ORDER — PHENYLEPHRINE HCL IN 0.9% NACL 1 MG/10 ML
SYRINGE (ML) INTRAVENOUS PRN
Status: DISCONTINUED | OUTPATIENT
Start: 2024-06-27 | End: 2024-06-27 | Stop reason: SDUPTHER

## 2024-06-27 RX ORDER — DEXAMETHASONE SODIUM PHOSPHATE 4 MG/ML
INJECTION, SOLUTION INTRA-ARTICULAR; INTRALESIONAL; INTRAMUSCULAR; INTRAVENOUS; SOFT TISSUE PRN
Status: DISCONTINUED | OUTPATIENT
Start: 2024-06-27 | End: 2024-06-27 | Stop reason: SDUPTHER

## 2024-06-27 RX ORDER — EPHEDRINE SULFATE 50 MG/ML
INJECTION INTRAVENOUS PRN
Status: DISCONTINUED | OUTPATIENT
Start: 2024-06-27 | End: 2024-06-27 | Stop reason: SDUPTHER

## 2024-06-27 RX ORDER — SODIUM CHLORIDE 0.9 % (FLUSH) 0.9 %
5-40 SYRINGE (ML) INJECTION PRN
Status: DISCONTINUED | OUTPATIENT
Start: 2024-06-27 | End: 2024-06-29 | Stop reason: HOSPADM

## 2024-06-27 RX ORDER — MIDAZOLAM HYDROCHLORIDE 1 MG/ML
INJECTION INTRAMUSCULAR; INTRAVENOUS PRN
Status: DISCONTINUED | OUTPATIENT
Start: 2024-06-27 | End: 2024-06-27 | Stop reason: SDUPTHER

## 2024-06-27 RX ORDER — HYDROMORPHONE HYDROCHLORIDE 2 MG/ML
INJECTION, SOLUTION INTRAMUSCULAR; INTRAVENOUS; SUBCUTANEOUS PRN
Status: DISCONTINUED | OUTPATIENT
Start: 2024-06-27 | End: 2024-06-27 | Stop reason: SDUPTHER

## 2024-06-27 RX ORDER — SODIUM CHLORIDE 9 MG/ML
INJECTION, SOLUTION INTRAVENOUS PRN
Status: DISCONTINUED | OUTPATIENT
Start: 2024-06-27 | End: 2024-06-29 | Stop reason: HOSPADM

## 2024-06-27 RX ORDER — VANCOMYCIN HYDROCHLORIDE 1 G/20ML
INJECTION, POWDER, LYOPHILIZED, FOR SOLUTION INTRAVENOUS PRN
Status: DISCONTINUED | OUTPATIENT
Start: 2024-06-27 | End: 2024-06-27 | Stop reason: ALTCHOICE

## 2024-06-27 RX ORDER — PROPOFOL 10 MG/ML
INJECTION, EMULSION INTRAVENOUS PRN
Status: DISCONTINUED | OUTPATIENT
Start: 2024-06-27 | End: 2024-06-27 | Stop reason: SDUPTHER

## 2024-06-27 RX ORDER — AMLODIPINE BESYLATE 5 MG/1
10 TABLET ORAL DAILY
Status: DISCONTINUED | OUTPATIENT
Start: 2024-06-28 | End: 2024-06-29 | Stop reason: HOSPADM

## 2024-06-27 RX ORDER — TRANEXAMIC ACID 10 MG/ML
1000 INJECTION, SOLUTION INTRAVENOUS
Status: COMPLETED | OUTPATIENT
Start: 2024-06-27 | End: 2024-06-27

## 2024-06-27 RX ORDER — SODIUM CHLORIDE 9 MG/ML
INJECTION, SOLUTION INTRAVENOUS PRN
Status: DISCONTINUED | OUTPATIENT
Start: 2024-06-27 | End: 2024-06-27 | Stop reason: HOSPADM

## 2024-06-27 RX ORDER — OXYCODONE HYDROCHLORIDE 5 MG/1
5 TABLET ORAL PRN
Status: COMPLETED | OUTPATIENT
Start: 2024-06-27 | End: 2024-06-27

## 2024-06-27 RX ORDER — DEXAMETHASONE SODIUM PHOSPHATE 4 MG/ML
8 INJECTION, SOLUTION INTRA-ARTICULAR; INTRALESIONAL; INTRAMUSCULAR; INTRAVENOUS; SOFT TISSUE ONCE
Status: DISCONTINUED | OUTPATIENT
Start: 2024-06-27 | End: 2024-06-27 | Stop reason: HOSPADM

## 2024-06-27 RX ORDER — ROCURONIUM BROMIDE 10 MG/ML
INJECTION, SOLUTION INTRAVENOUS PRN
Status: DISCONTINUED | OUTPATIENT
Start: 2024-06-27 | End: 2024-06-27 | Stop reason: SDUPTHER

## 2024-06-27 RX ORDER — SODIUM CHLORIDE, SODIUM LACTATE, POTASSIUM CHLORIDE, CALCIUM CHLORIDE 600; 310; 30; 20 MG/100ML; MG/100ML; MG/100ML; MG/100ML
INJECTION, SOLUTION INTRAVENOUS CONTINUOUS
Status: DISCONTINUED | OUTPATIENT
Start: 2024-06-27 | End: 2024-06-27 | Stop reason: HOSPADM

## 2024-06-27 RX ORDER — ONDANSETRON 2 MG/ML
INJECTION INTRAMUSCULAR; INTRAVENOUS PRN
Status: DISCONTINUED | OUTPATIENT
Start: 2024-06-27 | End: 2024-06-27 | Stop reason: SDUPTHER

## 2024-06-27 RX ORDER — NALOXONE HYDROCHLORIDE 0.4 MG/ML
INJECTION, SOLUTION INTRAMUSCULAR; INTRAVENOUS; SUBCUTANEOUS PRN
Status: DISCONTINUED | OUTPATIENT
Start: 2024-06-27 | End: 2024-06-27 | Stop reason: HOSPADM

## 2024-06-27 RX ORDER — ONDANSETRON 2 MG/ML
4 INJECTION INTRAMUSCULAR; INTRAVENOUS
Status: DISCONTINUED | OUTPATIENT
Start: 2024-06-27 | End: 2024-06-27 | Stop reason: HOSPADM

## 2024-06-27 RX ORDER — MEPERIDINE HYDROCHLORIDE 50 MG/ML
12.5 INJECTION INTRAMUSCULAR; INTRAVENOUS; SUBCUTANEOUS EVERY 5 MIN PRN
Status: DISCONTINUED | OUTPATIENT
Start: 2024-06-27 | End: 2024-06-27 | Stop reason: HOSPADM

## 2024-06-27 RX ORDER — ACETAMINOPHEN 325 MG/1
650 TABLET ORAL EVERY 4 HOURS PRN
Status: DISCONTINUED | OUTPATIENT
Start: 2024-06-27 | End: 2024-06-29 | Stop reason: HOSPADM

## 2024-06-27 RX ORDER — LABETALOL HYDROCHLORIDE 5 MG/ML
5 INJECTION, SOLUTION INTRAVENOUS EVERY 10 MIN PRN
Status: DISCONTINUED | OUTPATIENT
Start: 2024-06-27 | End: 2024-06-27 | Stop reason: HOSPADM

## 2024-06-27 RX ORDER — LIDOCAINE HYDROCHLORIDE 10 MG/ML
2 INJECTION, SOLUTION INFILTRATION; PERINEURAL
Status: DISCONTINUED | OUTPATIENT
Start: 2024-06-27 | End: 2024-06-27 | Stop reason: HOSPADM

## 2024-06-27 RX ORDER — OXYCODONE HYDROCHLORIDE 5 MG/1
10 TABLET ORAL PRN
Status: COMPLETED | OUTPATIENT
Start: 2024-06-27 | End: 2024-06-27

## 2024-06-27 RX ORDER — SODIUM CHLORIDE, SODIUM LACTATE, POTASSIUM CHLORIDE, CALCIUM CHLORIDE 600; 310; 30; 20 MG/100ML; MG/100ML; MG/100ML; MG/100ML
INJECTION, SOLUTION INTRAVENOUS CONTINUOUS PRN
Status: DISCONTINUED | OUTPATIENT
Start: 2024-06-27 | End: 2024-06-27 | Stop reason: SDUPTHER

## 2024-06-27 RX ORDER — CARVEDILOL 6.25 MG/1
12.5 TABLET ORAL 2 TIMES DAILY WITH MEALS
Status: DISCONTINUED | OUTPATIENT
Start: 2024-06-27 | End: 2024-06-29 | Stop reason: HOSPADM

## 2024-06-27 RX ORDER — OXYCODONE HYDROCHLORIDE 5 MG/1
10 TABLET ORAL EVERY 4 HOURS PRN
Status: DISCONTINUED | OUTPATIENT
Start: 2024-06-27 | End: 2024-06-29 | Stop reason: HOSPADM

## 2024-06-27 RX ORDER — MAGNESIUM SULFATE IN WATER 40 MG/ML
2000 INJECTION, SOLUTION INTRAVENOUS ONCE
Status: COMPLETED | OUTPATIENT
Start: 2024-06-27 | End: 2024-06-27

## 2024-06-27 RX ORDER — ACETAMINOPHEN 500 MG
1000 TABLET ORAL ONCE
Status: DISCONTINUED | OUTPATIENT
Start: 2024-06-27 | End: 2024-06-27 | Stop reason: HOSPADM

## 2024-06-27 RX ORDER — MIDAZOLAM HYDROCHLORIDE 1 MG/ML
1 INJECTION INTRAMUSCULAR; INTRAVENOUS ONCE
Status: COMPLETED | OUTPATIENT
Start: 2024-06-27 | End: 2024-06-27

## 2024-06-27 RX ORDER — OXYCODONE HYDROCHLORIDE 5 MG/1
5 TABLET ORAL ONCE
Status: COMPLETED | OUTPATIENT
Start: 2024-06-27 | End: 2024-06-27

## 2024-06-27 RX ORDER — SODIUM CHLORIDE 0.9 % (FLUSH) 0.9 %
5-40 SYRINGE (ML) INJECTION EVERY 12 HOURS SCHEDULED
Status: DISCONTINUED | OUTPATIENT
Start: 2024-06-27 | End: 2024-06-29 | Stop reason: HOSPADM

## 2024-06-27 RX ORDER — LIDOCAINE HYDROCHLORIDE 10 MG/ML
1 INJECTION, SOLUTION EPIDURAL; INFILTRATION; INTRACAUDAL; PERINEURAL
Status: DISCONTINUED | OUTPATIENT
Start: 2024-06-27 | End: 2024-06-27 | Stop reason: HOSPADM

## 2024-06-27 RX ORDER — ACETAMINOPHEN 325 MG/1
650 TABLET ORAL EVERY 6 HOURS
Status: DISCONTINUED | OUTPATIENT
Start: 2024-06-27 | End: 2024-06-29 | Stop reason: HOSPADM

## 2024-06-27 RX ORDER — OXYCODONE HYDROCHLORIDE 5 MG/1
5 TABLET ORAL EVERY 4 HOURS PRN
Status: DISCONTINUED | OUTPATIENT
Start: 2024-06-27 | End: 2024-06-29 | Stop reason: HOSPADM

## 2024-06-27 RX ORDER — DIPHENHYDRAMINE HYDROCHLORIDE 50 MG/ML
12.5 INJECTION INTRAMUSCULAR; INTRAVENOUS
Status: COMPLETED | OUTPATIENT
Start: 2024-06-27 | End: 2024-06-27

## 2024-06-27 RX ORDER — MELOXICAM 7.5 MG/1
7.5 TABLET ORAL ONCE
Status: DISCONTINUED | OUTPATIENT
Start: 2024-06-27 | End: 2024-06-27 | Stop reason: HOSPADM

## 2024-06-27 RX ORDER — LOSARTAN POTASSIUM 100 MG/1
100 TABLET ORAL DAILY
Status: DISCONTINUED | OUTPATIENT
Start: 2024-06-27 | End: 2024-06-29 | Stop reason: HOSPADM

## 2024-06-27 RX ORDER — PANTOPRAZOLE SODIUM 40 MG/1
40 TABLET, DELAYED RELEASE ORAL DAILY PRN
Status: DISCONTINUED | OUTPATIENT
Start: 2024-06-27 | End: 2024-06-29 | Stop reason: HOSPADM

## 2024-06-27 RX ORDER — MORPHINE SULFATE 4 MG/ML
4 INJECTION, SOLUTION INTRAMUSCULAR; INTRAVENOUS
Status: DISCONTINUED | OUTPATIENT
Start: 2024-06-27 | End: 2024-06-29 | Stop reason: HOSPADM

## 2024-06-27 RX ORDER — DEXAMETHASONE SODIUM PHOSPHATE 10 MG/ML
8 INJECTION INTRAMUSCULAR; INTRAVENOUS EVERY 8 HOURS
Status: COMPLETED | OUTPATIENT
Start: 2024-06-27 | End: 2024-06-28

## 2024-06-27 RX ORDER — MAGNESIUM HYDROXIDE 1200 MG/15ML
LIQUID ORAL CONTINUOUS PRN
Status: COMPLETED | OUTPATIENT
Start: 2024-06-27 | End: 2024-06-27

## 2024-06-27 RX ORDER — KETAMINE HCL IN NACL, ISO-OSM 100MG/10ML
SYRINGE (ML) INJECTION PRN
Status: DISCONTINUED | OUTPATIENT
Start: 2024-06-27 | End: 2024-06-27 | Stop reason: SDUPTHER

## 2024-06-27 RX ADMIN — DEXAMETHASONE SODIUM PHOSPHATE 8 MG: 10 INJECTION INTRAMUSCULAR; INTRAVENOUS at 20:12

## 2024-06-27 RX ADMIN — Medication 3000 MG: at 11:57

## 2024-06-27 RX ADMIN — ROCURONIUM BROMIDE 100 MG: 50 INJECTION, SOLUTION INTRAVENOUS at 11:38

## 2024-06-27 RX ADMIN — HYDROMORPHONE HYDROCHLORIDE 0.5 MG: 1 INJECTION, SOLUTION INTRAMUSCULAR; INTRAVENOUS; SUBCUTANEOUS at 14:14

## 2024-06-27 RX ADMIN — DEXAMETHASONE SODIUM PHOSPHATE 4 MG: 4 INJECTION, SOLUTION INTRAMUSCULAR; INTRAVENOUS at 12:10

## 2024-06-27 RX ADMIN — PROPOFOL 50 MG: 10 INJECTION, EMULSION INTRAVENOUS at 13:35

## 2024-06-27 RX ADMIN — OXYCODONE 5 MG: 5 TABLET ORAL at 15:06

## 2024-06-27 RX ADMIN — EPHEDRINE SULFATE 10 MG: 50 INJECTION INTRAVENOUS at 13:21

## 2024-06-27 RX ADMIN — MORPHINE SULFATE 4 MG: 4 INJECTION, SOLUTION INTRAMUSCULAR; INTRAVENOUS at 17:32

## 2024-06-27 RX ADMIN — PROPOFOL 300 MG: 10 INJECTION, EMULSION INTRAVENOUS at 11:37

## 2024-06-27 RX ADMIN — SODIUM CHLORIDE: 9 INJECTION, SOLUTION INTRAVENOUS at 16:44

## 2024-06-27 RX ADMIN — DIPHENHYDRAMINE HYDROCHLORIDE 12.5 MG: 50 INJECTION, SOLUTION INTRAMUSCULAR; INTRAVENOUS at 14:29

## 2024-06-27 RX ADMIN — OXYCODONE 5 MG: 5 TABLET ORAL at 15:27

## 2024-06-27 RX ADMIN — Medication 200 MCG: at 12:18

## 2024-06-27 RX ADMIN — MIDAZOLAM 1 MG: 1 INJECTION INTRAMUSCULAR; INTRAVENOUS at 14:45

## 2024-06-27 RX ADMIN — CARVEDILOL 12.5 MG: 6.25 TABLET, FILM COATED ORAL at 17:25

## 2024-06-27 RX ADMIN — MIDAZOLAM 2 MG: 1 INJECTION INTRAMUSCULAR; INTRAVENOUS at 11:18

## 2024-06-27 RX ADMIN — TRANEXAMIC ACID 1000 MG: 10 INJECTION, SOLUTION INTRAVENOUS at 11:57

## 2024-06-27 RX ADMIN — ONDANSETRON 4 MG: 2 INJECTION INTRAMUSCULAR; INTRAVENOUS at 12:10

## 2024-06-27 RX ADMIN — OXYCODONE 10 MG: 5 TABLET ORAL at 21:52

## 2024-06-27 RX ADMIN — EPHEDRINE SULFATE 15 MG: 50 INJECTION INTRAVENOUS at 13:10

## 2024-06-27 RX ADMIN — SUGAMMADEX 400 MG: 100 INJECTION, SOLUTION INTRAVENOUS at 13:21

## 2024-06-27 RX ADMIN — Medication 2 AMPULE: at 10:40

## 2024-06-27 RX ADMIN — CEFAZOLIN 3000 MG: 10 INJECTION, POWDER, FOR SOLUTION INTRAVENOUS at 20:08

## 2024-06-27 RX ADMIN — Medication 25 MG: at 13:42

## 2024-06-27 RX ADMIN — SODIUM CHLORIDE, SODIUM LACTATE, POTASSIUM CHLORIDE, AND CALCIUM CHLORIDE: .6; .31; .03; .02 INJECTION, SOLUTION INTRAVENOUS at 11:18

## 2024-06-27 RX ADMIN — ACETAMINOPHEN 650 MG: 325 TABLET ORAL at 23:50

## 2024-06-27 RX ADMIN — METHOCARBAMOL 1000 MG: 100 INJECTION INTRAMUSCULAR; INTRAVENOUS at 13:03

## 2024-06-27 RX ADMIN — SODIUM CHLORIDE, PRESERVATIVE FREE 10 ML: 5 INJECTION INTRAVENOUS at 20:13

## 2024-06-27 RX ADMIN — PROPOFOL 50 MG: 10 INJECTION, EMULSION INTRAVENOUS at 13:45

## 2024-06-27 RX ADMIN — MAGNESIUM SULFATE IN WATER 2000 MG: 40 INJECTION, SOLUTION INTRAVENOUS at 11:57

## 2024-06-27 RX ADMIN — HYDROMORPHONE HYDROCHLORIDE 2 MG: 2 INJECTION, SOLUTION INTRAMUSCULAR; INTRAVENOUS; SUBCUTANEOUS at 11:37

## 2024-06-27 RX ADMIN — SODIUM CHLORIDE, SODIUM LACTATE, POTASSIUM CHLORIDE, AND CALCIUM CHLORIDE: .6; .31; .03; .02 INJECTION, SOLUTION INTRAVENOUS at 13:18

## 2024-06-27 RX ADMIN — HYDROMORPHONE HYDROCHLORIDE 0.5 MG: 1 INJECTION, SOLUTION INTRAMUSCULAR; INTRAVENOUS; SUBCUTANEOUS at 14:24

## 2024-06-27 RX ADMIN — EPHEDRINE SULFATE 15 MG: 50 INJECTION INTRAVENOUS at 12:22

## 2024-06-27 RX ADMIN — LOSARTAN POTASSIUM 100 MG: 100 TABLET, FILM COATED ORAL at 17:25

## 2024-06-27 ASSESSMENT — PAIN DESCRIPTION - DESCRIPTORS
DESCRIPTORS: ACHING
DESCRIPTORS: ACHING
DESCRIPTORS: ACHING;DISCOMFORT
DESCRIPTORS: ACHING
DESCRIPTORS: ACHING;SORE

## 2024-06-27 ASSESSMENT — PAIN DESCRIPTION - LOCATION
LOCATION: HIP

## 2024-06-27 ASSESSMENT — PAIN DESCRIPTION - ORIENTATION
ORIENTATION: LEFT

## 2024-06-27 ASSESSMENT — PAIN SCALES - WONG BAKER
WONGBAKER_NUMERICALRESPONSE: NO HURT
WONGBAKER_NUMERICALRESPONSE: HURTS EVEN MORE
WONGBAKER_NUMERICALRESPONSE: HURTS LITTLE MORE

## 2024-06-27 ASSESSMENT — PAIN SCALES - GENERAL
PAINLEVEL_OUTOF10: 2
PAINLEVEL_OUTOF10: 8
PAINLEVEL_OUTOF10: 7
PAINLEVEL_OUTOF10: 7
PAINLEVEL_OUTOF10: 2
PAINLEVEL_OUTOF10: 8

## 2024-06-27 ASSESSMENT — PAIN DESCRIPTION - PAIN TYPE
TYPE: SURGICAL PAIN
TYPE: SURGICAL PAIN

## 2024-06-27 ASSESSMENT — PAIN - FUNCTIONAL ASSESSMENT
PAIN_FUNCTIONAL_ASSESSMENT: ACTIVITIES ARE NOT PREVENTED
PAIN_FUNCTIONAL_ASSESSMENT: 0-10
PAIN_FUNCTIONAL_ASSESSMENT: PREVENTS OR INTERFERES SOME ACTIVE ACTIVITIES AND ADLS

## 2024-06-27 NOTE — DISCHARGE INSTRUCTIONS
No activity restrictions    Ice to front of the hip when able    Tylenol and Anti-inflammatory for pain control, scheduled    Oxycodone as needed    PT as scheduled    Water proof dressing to remain on until follow up, OK to shower    Follow up with Dr. Alvarado in 2-3 weeks     Patient's mother calls back and is agreeable to having labs drawn first.  She would like results from labs prior to starting the Prednisone, but is also agreeable to the Prednisone.  Patient's mother transferred to scheduling to assist.  Lab orders entered at this time.  Writer did call patient's mother per Dr. Hillman request and informed her that the ANANT could lead to multiple other tests if it comes out positive.  Patient's mother verbalized understanding and had no further questions.

## 2024-06-27 NOTE — PROGRESS NOTES
Patient admitted to [facility] bay 6. Consents verified. Patient NPO since 2100. Patient belongings to remain on PACU cart for procedure.

## 2024-06-27 NOTE — OP NOTE
Operative Note      Patient: Mekhi Robles  YOB: 1965  MRN: [id number]    Date of Procedure: 6/27/2024    Pre-Op Diagnosis Codes:     * Primary osteoarthritis of left hip [M16.12]    Post-Op Diagnosis: Same       Procedure(s):  LEFT TOTAL HIP ARTHROPLASTY, ANTERIOR APPROACH    Surgeon(s):  Abram Alvarado MD    Assistant:   Surgical Assistant: Fady Arrington Joseph    Anesthesia: General    Estimated Blood Loss (mL): 300     Complications: None    Specimens:   * No specimens in log *    Implants:  Implant Name Type Inv. Item Serial No.  Lot No. LRB No. Used Action   LINER ACET NEUT F 40 MM VIVACIT-E G7 - [id number]  LINER ACET NEUT F 40 MM VIVACIT-E G7  [employer]- [id number] Left 1 Implanted   SHELL ACET SZ F [id number] 4 H OSSEOTI LIMIT H 2 MOBILITY G7 - [id number]  SHELL ACET SZ F [id number] 4 H OSSEOTI LIMIT H 2 MOBILITY G7  [employer]- [id number] Left 1 Implanted   HEAD FEM 3.5- MM 12/14 40 MM HIP TAPR BIOLOX DELT - [id number]  HEAD FEM 3.5- MM 12/14 40 MM HIP TAPR BIOLOX DELT  [employer]- [id number] Left 1 Implanted   STEM FEM STD OFFSET 5 HIP HA AVENIR COMPLETE - [id number]  STEM FEM STD OFFSET 5 HIP HA AVENIR COMPLETE  [employer]- [id number] Left 1 Implanted         Drains: * No LDAs found *    Findings:  Infection Present At Time Of Surgery (PATOS) (choose all levels that have infection present):  No infection present  Other Findings: sevre OA    Detailed Description of Procedure:   This is a 58 y/o M that was evaluated by myself in office for L hip osteoarthritis. They were noted to have significant limitations to activities of daily living despite conservative treatments. Risks benefits limitations and alternatives to L anterior hip replacement were discussed with the patient who did wish to proceed.    The patient was met in the preoperative holding area last-minute questions were answered, the L hip was  The foot of the leg was externally rotated and the hip was extended.  Retractors were placed exposing the proximal femur.  A release of the superior capsule was performed to allow hip extension and external rotation exposure of the proximal femur.  The table mounted femoral elevator was attached. once this exposure was sufficient the proximal femur was prepared for the stem in standard fashion with a lateral box osteotome, canal finder and sequential broaching up to a size 5 which had good bony stability.     We then proceeded to trial taking into account soft tissue tension stability and leg length and offset on fluoroscopy.  We found these parameters were optimal with a STD neck and 40 (-3.5) head.  Calcar reaming was performed as necessary for the collared stem.  The canal was irrigated with dilute Betadine and saline.  The final stem was seated into the femur noting appropriate contact between the collar and calcar.  We again trialed with the 40 (-3.5) head which was confirmed to have excellent stability and soft tissue tension.  The hip was then dislocated and the trunnion was irrigated and dried and the final head was impacted and the hip was reduced.  Final fluoroscopy images confirmed appropriate position of all implants.  The wound was thoroughly irrigated with dilute Betadine and saline.  Local anesthesia was administered with the ortho mix solution.  1 g of vancomycin powder was left intra-articularly and closure then ensued.  This was #2 strata fix for the fascia, 2 0 strata fix for the subcutaneous tissues and 3-0 Monocryl for the skin.  A pernio glue and occlusive dressing were applied.  The patient was awoken from anesthesia and taken to PACU in stable condition.    Postoperatively the patient can weight-bear as tolerated with no restrictions.  They will work with physical therapy and have pain controlled with minimal narcotics prior to discharge. Eliquis x 1 mo For DVT prophylaxis.  Plan to discharge

## 2024-06-27 NOTE — PROGRESS NOTES
Pt brought to PACU. Report obtained from OR RN and anesthesia. Pt placed on monitor SR and O2 at  NRB. Left hip dressing C/D/I

## 2024-06-27 NOTE — PLAN OF CARE
Problem: Pain  Goal: Verbalizes/displays adequate comfort level or baseline comfort level  Outcome: Progressing   Pain frequently assessed. See MAR. Staff assisting with repositioning. Ice packs applied intermittently. Elevation

## 2024-06-27 NOTE — DISCHARGE INSTR - COC
Active Problem List   Diagnosis Code    Primary cardiomyopathy (HCC) I42.9    Arthropathy M12.9    Morbid obesity ([facility]) E66.01    DANIELLE on CPAP G47.33    H/O: Bell's palsy Z86.69    Low back pain M54.50    Essential hypertension, benign I10    GERD (gastroesophageal reflux disease) K21.9    Osteoarthritis of left knee M17.12    Status post total left knee replacement Z96.652    Primary localized osteoarthritis of right knee M17.11    Epididymal cyst N50.3    Urinary urgency R39.15    Primary osteoarthritis of left hip M16.12    S/P total left hip arthroplasty Z96.642       Isolation/Infection:   Isolation            No Isolation          Patient Infection Status       None to display            Nurse Assessment:  Last Vital Signs: BP (!) 175/95   Pulse 74   Temp 97.5 °F (36.4 °C) (Axillary)   Resp 16   Ht 1.88 m (6' 2\")   Wt (!) 170.1 kg (375 lb)   SpO2 96%   BMI 48.15 kg/m²     Last documented pain score (0-10 scale): Pain Level: 8  Last Weight:   Wt Readings from Last 1 Encounters:   06/27/24 (!) 170.1 kg (375 lb)     Mental Status:  oriented and alert    IV Access:  none    Nursing Mobility/ADLs:  Walking   Assisted  Transfer  Assisted  Bathing  Independent  Dressing  Independent  Toileting  Independent  Feeding  Independent  Med Admin  Independent  Med Delivery   whole    Wound Care Documentation and Therapy:  Incision 06/27/24 Hip Left;Anterior (Active)   Dressing Status Clean;Dry;Intact 06/27/24 1647   Dressing/Treatment Dry dressing 06/27/24 1647   Drainage Amount None (dry) 06/27/24 1647   Number of days: 0        Elimination:  Continence:   Bowel: Yes  Bladder: Yes  Urinary Catheter: None   Colostomy/Ileostomy/Ileal Conduit: No       Date of Last BM:     Intake/Output Summary (Last 24 hours) at 6/27/2024 1820  Last data filed at 6/27/2024 1651  Gross per 24 hour   Intake 1610 ml   Output 125 ml   Net 1485 ml     No intake/output data recorded.    Safety Concerns:     At Risk for  Falls    Impairments/Disabilities:      Left hip replacement    Nutrition Therapy:  Current Nutrition Therapy:   - Oral Diet:  General    Routes of Feeding: Oral  Liquids: Thin Liquids  Daily Fluid Restriction: no  Last Modified Barium Swallow with Video (Video Swallowing Test): not done    Treatments at the Time of Hospital Discharge:   Respiratory Treatments:   Oxygen Therapy:  is not on home oxygen therapy.  Ventilator:    - No ventilator support    Rehab Therapies: Physical Therapy and Occupational Therapy  Weight Bearing Status/Restrictions: No weight bearing restrictions  Other Medical Equipment (for information only, NOT a DME order):  walker  Other Treatments:     Patient's personal belongings (please select all that are sent with patient):  Phone, clothes    RN SIGNATURE:  Electronically signed by Verónica Troncoso RN on 6/29/24 at 10:40 AM EDT    CASE MANAGEMENT/SOCIAL WORK SECTION    Inpatient Status Date: 6/27    Readmission Risk Assessment Score:  Readmission Risk              Risk of Unplanned Readmission:  0           Discharging to Facility/ Agency   Name: [facility]  Address:  Phone: [phone]  Fax:    Dialysis Facility (if applicable)   Name:  Address:  Dialysis Schedule:  Phone:  Fax:    / signature: Electronically signed by Lashawn Lowe RN on 6/29/24 at 10:16 AM EDT    PHYSICIAN SECTION    Prognosis: Good    Condition at Discharge: Stable    Rehab Potential (if transferring to Rehab): Good    Recommended Labs or Other Treatments After Discharge: PT/OT- WBAT to the LLE with assist device   Eliquis 2.5mg BID as DVT prophylaxis for 30 days postoperatively  Thigh high compression stockings to be placed daily and removed nightly for 30 days post op  Follow-up with Dr. Mera in 3 weeks postoperatively.  Call [facility] orthopedics at [phone] to schedule an appointment or with any questions.      Physician Certification: I certify the above information and

## 2024-06-27 NOTE — ANESTHESIA PRE PROCEDURE
Department of Anesthesiology  Preprocedure Note       Name:  Mekhi Robles   Age:  59 y.o.  :  1965                                          MRN:  [id number]         Date:  2024      Surgeon: Surgeon(s):  Abram Alvarado MD    Procedure: Procedure(s):  LEFT TOTAL HIP ARTHROPLASTY, ANTERIOR APPROACH    Medications prior to admission:   Prior to Admission medications    Medication Sig Start Date End Date Taking? Authorizing Provider   losartan (COZAAR) 100 MG tablet Take 1 tablet by mouth daily 6/10/24   Suleman Navarro MD   amLODIPine (NORVASC) 10 MG tablet Take 1 tablet by mouth daily 6/10/24   Suleman Navarro MD   acetaminophen (TYLENOL) 500 MG tablet Take 2 tablets by mouth every 6 hours as needed for Pain    ProviderJohan MD   celecoxib (CELEBREX) 200 MG capsule Take 1 capsule by mouth daily 24   Cody Sanz MD   OMEPRAZOLE PO Take by mouth daily as needed (GERD) OTC    Provider, MD Johan   carvedilol (COREG) 6.25 MG tablet TAKE 2 TABLETS BY MOUTH TWICE  DAILY WITH MEALS  Patient taking differently: Take 2 tablets by mouth 2 times daily (with meals) TAKE 2 TABLETS BY MOUTH  TWICE DAILY WITH MEALS 24   Suleman Navarro MD   Multiple Vitamins-Minerals (THERAPEUTIC MULTIVITAMIN-MINERALS) tablet Take 1 tablet by mouth daily    ProviderJohan MD       Current medications:    Current Facility-Administered Medications   Medication Dose Route Frequency Provider Last Rate Last Admin    ortho mix injection   Injection On Call Abram Alvarado MD         Current Outpatient Medications   Medication Sig Dispense Refill    losartan (COZAAR) 100 MG tablet Take 1 tablet by mouth daily 90 tablet 3    amLODIPine (NORVASC) 10 MG tablet Take 1 tablet by mouth daily 90 tablet 3    acetaminophen (TYLENOL) 500 MG tablet Take 2 tablets by mouth every 6 hours as needed for Pain      celecoxib (CELEBREX) 200 MG capsule Take 1 capsule by mouth daily 60 capsule 3

## 2024-06-27 NOTE — CONSULTS
Hospital Medicine Consult History & Physical    Date of Service: Pt seen/examined in consultation on 6/27/2024   at the request of Abram Alvarado MD for medical management.    Chief Complaint: DANIELLE    Presenting Admission History:   59 y.o. male who presented to [facility] with Left hip OA.  PMHx significant for obstructive sleep apnea.  Patient compliant his CPAP at home.  Patient with history of left hip osteoarthritis.  Status post left hip arthroplasty with anterior approach by Dr. Reynoso.  Patient states pain is improved.  No nausea or vomiting.  He is feeling sleepy.  Using BiPAP currently.  No nausea or vomiting.  Patient denies chest pain.  Patient denies history of diabetes.  Patient with history of hypertension.  He is compliant with medications at home.    Past medical history:    Past Medical History:   Diagnosis Date    Arthritis     L hip, R hip ,jey shoulders    Chest pain 01/2011    [facility]--neg labs and stress echo    Essential hypertension, benign     GERD (gastroesophageal reflux disease)     H/O: Bell's palsy 2006    occ has sx    Morbid obesity (HCC)     DANIELLE on CPAP     Primary cardiomyopathy (HCC)     Unspecified sleep apnea     Wears glasses      Past Surgical History:   Procedure Laterality Date    COLONOSCOPY  02/2017    q 10    DENTAL SURGERY      GASTRIC BAND  2008    KNEE SURGERY Bilateral 1992 and  2005     TOTAL HIP ARTHROPLASTY Left 6/27/2024    LEFT TOTAL HIP ARTHROPLASTY, ANTERIOR APPROACH performed by Abram Alvarado MD at [facility] OR    TOTAL KNEE ARTHROPLASTY Bilateral 01/2022    True         Assessment/Plan:    Current Principal Problem:  Primary osteoarthritis of left hip    1.  Osteoarthritis of the left hip.  Status post left total hip arthroplasty with anterior approach.  Ortho following.  Pain management per orthopedic surgery.  Patient currently on as needed morphine sulfate.  Consider transitioning to oral pain medication.  2.  Hypertension.  Will continue patient  \"HCT\", \"PLT\" in the last 72 hours.  No results for input(s): \"NA\", \"K\", \"CL\", \"CO2\", \"BUN\", \"CREATININE\", \"CALCIUM\", \"MG\", \"PHOS\" in the last 72 hours.  No results for input(s): \"PROBNP\", \"TROPHS\" in the last 72 hours.  No results for input(s): \"LABA1C\" in the last 72 hours.  No results for input(s): \"AST\", \"ALT\", \"BILIDIR\", \"BILITOT\", \"ALKPHOS\" in the last 72 hours.  No results for input(s): \"INR\", \"LACTA\", \"TSH\" in the last 72 hours.    Consults:    IP CONSULT TO HOSPITALIST  IP CONSULT TO CASE MANAGEMENT  IP CONSULT TO DIETITIAN  IP CONSULT TO SOCIAL WORK     NELI THOMPSON MD

## 2024-06-27 NOTE — ANESTHESIA POSTPROCEDURE EVALUATION
CL                       102                 06/05/2024 03:15 PM        CO2                      27                  06/05/2024 03:15 PM        BUN                      17                  06/05/2024 03:15 PM        CREATININE               0.8                 06/05/2024 03:15 PM        GLUCOSE                  109 (H)             06/05/2024 03:15 PM   COAGS  Lab Results       Component                Value               Date/Time                  PROTIME                  13.4                06/05/2024 03:15 PM        INR                      1.00                06/05/2024 03:15 PM        APTT                     32.1                06/05/2024 03:15 PM     Intake & Output:  @24HRIO@    Nausea & Vomiting:  No    Level of Consciousness:  Awake    Pain Assessment:  Adequate analgesia    Anesthesia Complications:  No apparent anesthetic complications    SUMMARY      Vital signs stable  OK to discharge from Stage I post anesthesia care.  Care transferred from Anesthesiology department on discharge from perioperative area     No notable events documented.

## 2024-06-27 NOTE — H&P
Update History & Physical    The patient's History and Physical of May 10, 2024 was reviewed with the patient and I examined the patient. There was no change. The surgical site was confirmed by the patient and me.       Plan: The risks, benefits, expected outcome, and alternative to the recommended procedure have been discussed with the patient. Patient understands and wants to proceed with the procedure.     Electronically signed by Abram Alvarado MD on 6/27/2024 at 10:09 AM

## 2024-06-28 LAB
ANION GAP SERPL CALCULATED.3IONS-SCNC: 11 MMOL/L (ref 3–16)
BUN SERPL-MCNC: 21 MG/DL (ref 7–20)
CALCIUM SERPL-MCNC: 8.3 MG/DL (ref 8.3–10.6)
CHLORIDE SERPL-SCNC: 103 MMOL/L (ref 99–110)
CO2 SERPL-SCNC: 24 MMOL/L (ref 21–32)
CREAT SERPL-MCNC: 1.1 MG/DL (ref 0.8–1.3)
GFR SERPLBLD CREATININE-BSD FMLA CKD-EPI: 77 ML/MIN/{1.73_M2}
GLUCOSE SERPL-MCNC: 145 MG/DL (ref 70–99)
HCT VFR BLD AUTO: 34.7 % (ref 40.5–52.5)
HGB BLD-MCNC: 11.4 G/DL (ref 13.5–17.5)
POTASSIUM SERPL-SCNC: 3.8 MMOL/L (ref 3.5–5.1)
SODIUM SERPL-SCNC: 138 MMOL/L (ref 136–145)

## 2024-06-28 PROCEDURE — 85014 HEMATOCRIT: CPT

## 2024-06-28 PROCEDURE — 96374 THER/PROPH/DIAG INJ IV PUSH: CPT

## 2024-06-28 PROCEDURE — 6370000000 HC RX 637 (ALT 250 FOR IP): Performed by: STUDENT IN AN ORGANIZED HEALTH CARE EDUCATION/TRAINING PROGRAM

## 2024-06-28 PROCEDURE — 6370000000 HC RX 637 (ALT 250 FOR IP): Performed by: ORTHOPAEDIC SURGERY

## 2024-06-28 PROCEDURE — 85018 HEMOGLOBIN: CPT

## 2024-06-28 PROCEDURE — 6370000000 HC RX 637 (ALT 250 FOR IP): Performed by: NURSE PRACTITIONER

## 2024-06-28 PROCEDURE — 97530 THERAPEUTIC ACTIVITIES: CPT

## 2024-06-28 PROCEDURE — 99024 POSTOP FOLLOW-UP VISIT: CPT | Performed by: PHYSICIAN ASSISTANT

## 2024-06-28 PROCEDURE — 97116 GAIT TRAINING THERAPY: CPT

## 2024-06-28 PROCEDURE — 6370000000 HC RX 637 (ALT 250 FOR IP)

## 2024-06-28 PROCEDURE — 6360000002 HC RX W HCPCS: Performed by: STUDENT IN AN ORGANIZED HEALTH CARE EDUCATION/TRAINING PROGRAM

## 2024-06-28 PROCEDURE — G0378 HOSPITAL OBSERVATION PER HR: HCPCS

## 2024-06-28 PROCEDURE — 97162 PT EVAL MOD COMPLEX 30 MIN: CPT

## 2024-06-28 PROCEDURE — 80048 BASIC METABOLIC PNL TOTAL CA: CPT

## 2024-06-28 PROCEDURE — 2580000003 HC RX 258: Performed by: STUDENT IN AN ORGANIZED HEALTH CARE EDUCATION/TRAINING PROGRAM

## 2024-06-28 PROCEDURE — 96376 TX/PRO/DX INJ SAME DRUG ADON: CPT

## 2024-06-28 PROCEDURE — 97535 SELF CARE MNGMENT TRAINING: CPT

## 2024-06-28 PROCEDURE — 97166 OT EVAL MOD COMPLEX 45 MIN: CPT

## 2024-06-28 RX ORDER — METHOCARBAMOL 500 MG/1
1000 TABLET, FILM COATED ORAL 3 TIMES DAILY PRN
Status: DISCONTINUED | OUTPATIENT
Start: 2024-06-28 | End: 2024-06-29 | Stop reason: HOSPADM

## 2024-06-28 RX ORDER — ACETAMINOPHEN 500 MG
1000 TABLET ORAL EVERY 8 HOURS
Qty: 120 TABLET | Refills: 3 | Status: SHIPPED | OUTPATIENT
Start: 2024-06-28

## 2024-06-28 RX ORDER — OXYCODONE HYDROCHLORIDE 5 MG/1
5 TABLET ORAL EVERY 6 HOURS PRN
Qty: 28 TABLET | Refills: 0 | Status: SHIPPED | OUTPATIENT
Start: 2024-06-28 | End: 2024-07-05

## 2024-06-28 RX ORDER — SENNOSIDES A AND B 8.6 MG/1
1 TABLET, FILM COATED ORAL NIGHTLY
Status: DISCONTINUED | OUTPATIENT
Start: 2024-06-28 | End: 2024-06-29 | Stop reason: HOSPADM

## 2024-06-28 RX ORDER — ONDANSETRON 2 MG/ML
4 INJECTION INTRAMUSCULAR; INTRAVENOUS EVERY 6 HOURS PRN
Status: DISCONTINUED | OUTPATIENT
Start: 2024-06-28 | End: 2024-06-29 | Stop reason: HOSPADM

## 2024-06-28 RX ORDER — CELECOXIB 200 MG/1
200 CAPSULE ORAL 2 TIMES DAILY
Qty: 60 CAPSULE | Refills: 3 | Status: SHIPPED | OUTPATIENT
Start: 2024-06-28

## 2024-06-28 RX ORDER — POLYETHYLENE GLYCOL 3350 17 G/17G
17 POWDER, FOR SOLUTION ORAL DAILY
Status: DISCONTINUED | OUTPATIENT
Start: 2024-06-28 | End: 2024-06-29 | Stop reason: HOSPADM

## 2024-06-28 RX ADMIN — AMLODIPINE BESYLATE 10 MG: 5 TABLET ORAL at 08:44

## 2024-06-28 RX ADMIN — CEFAZOLIN 3000 MG: 10 INJECTION, POWDER, FOR SOLUTION INTRAVENOUS at 03:47

## 2024-06-28 RX ADMIN — ACETAMINOPHEN 650 MG: 325 TABLET ORAL at 03:44

## 2024-06-28 RX ADMIN — OXYCODONE 10 MG: 5 TABLET ORAL at 21:20

## 2024-06-28 RX ADMIN — OXYCODONE 10 MG: 5 TABLET ORAL at 07:13

## 2024-06-28 RX ADMIN — OXYCODONE 10 MG: 5 TABLET ORAL at 12:08

## 2024-06-28 RX ADMIN — APIXABAN 2.5 MG: 2.5 TABLET, FILM COATED ORAL at 21:20

## 2024-06-28 RX ADMIN — PANTOPRAZOLE SODIUM 40 MG: 40 TABLET, DELAYED RELEASE ORAL at 13:36

## 2024-06-28 RX ADMIN — SENNOSIDES 8.6 MG: 8.6 TABLET, FILM COATED ORAL at 21:20

## 2024-06-28 RX ADMIN — ACETAMINOPHEN 650 MG: 325 TABLET ORAL at 16:36

## 2024-06-28 RX ADMIN — METHOCARBAMOL 1000 MG: 500 TABLET ORAL at 21:19

## 2024-06-28 RX ADMIN — CARVEDILOL 12.5 MG: 6.25 TABLET, FILM COATED ORAL at 08:44

## 2024-06-28 RX ADMIN — DEXAMETHASONE SODIUM PHOSPHATE 8 MG: 10 INJECTION INTRAMUSCULAR; INTRAVENOUS at 03:45

## 2024-06-28 RX ADMIN — POLYETHYLENE GLYCOL 3350 17 G: 17 POWDER, FOR SOLUTION ORAL at 16:35

## 2024-06-28 RX ADMIN — ACETAMINOPHEN 650 MG: 325 TABLET ORAL at 08:45

## 2024-06-28 RX ADMIN — LOSARTAN POTASSIUM 100 MG: 100 TABLET, FILM COATED ORAL at 08:45

## 2024-06-28 RX ADMIN — Medication 1 TABLET: at 08:43

## 2024-06-28 RX ADMIN — APIXABAN 2.5 MG: 2.5 TABLET, FILM COATED ORAL at 07:15

## 2024-06-28 RX ADMIN — ALUMINUM HYDROXIDE AND MAGNESIUM HYDROXIDE 20 ML: 200; 200 SUSPENSION ORAL at 16:35

## 2024-06-28 RX ADMIN — DEXAMETHASONE SODIUM PHOSPHATE 8 MG: 10 INJECTION INTRAMUSCULAR; INTRAVENOUS at 12:01

## 2024-06-28 RX ADMIN — ACETAMINOPHEN 650 MG: 325 TABLET ORAL at 21:20

## 2024-06-28 ASSESSMENT — PAIN DESCRIPTION - DESCRIPTORS
DESCRIPTORS: ACHING;SPASM
DESCRIPTORS: ACHING;CRAMPING
DESCRIPTORS: ACHING;SPASM
DESCRIPTORS: ACHING;SORE

## 2024-06-28 ASSESSMENT — PAIN SCALES - GENERAL
PAINLEVEL_OUTOF10: 2
PAINLEVEL_OUTOF10: 2
PAINLEVEL_OUTOF10: 0
PAINLEVEL_OUTOF10: 7
PAINLEVEL_OUTOF10: 2
PAINLEVEL_OUTOF10: 2
PAINLEVEL_OUTOF10: 7
PAINLEVEL_OUTOF10: 2

## 2024-06-28 ASSESSMENT — PAIN DESCRIPTION - LOCATION
LOCATION: HIP

## 2024-06-28 ASSESSMENT — PAIN DESCRIPTION - ORIENTATION
ORIENTATION: LEFT

## 2024-06-28 ASSESSMENT — PAIN - FUNCTIONAL ASSESSMENT
PAIN_FUNCTIONAL_ASSESSMENT: PREVENTS OR INTERFERES SOME ACTIVE ACTIVITIES AND ADLS
PAIN_FUNCTIONAL_ASSESSMENT: PREVENTS OR INTERFERES SOME ACTIVE ACTIVITIES AND ADLS

## 2024-06-28 NOTE — DISCHARGE SUMMARY
Department of Orthopedic Surgery  Physician Assistant   Discharge Summary    The Mekhi Robles is a 59 y.o. male admitted for left hip osteoarthritis.  Mekhi Robles was admitted to the floor following His recovery in the PACU.     Discharge Diagnosis  left total hip arthroplasty    Current Inpatient Medications    Current Facility-Administered Medications: therapeutic multivitamin-minerals 1 tablet, 1 tablet, Oral, Daily  carvedilol (COREG) tablet 12.5 mg, 12.5 mg, Oral, BID WC  losartan (COZAAR) tablet 100 mg, 100 mg, Oral, Daily  amLODIPine (NORVASC) tablet 10 mg, 10 mg, Oral, Daily  pantoprazole (PROTONIX) tablet 40 mg, 40 mg, Oral, Daily PRN  0.9 % sodium chloride infusion, , IntraVENous, Continuous  sodium chloride flush 0.9 % injection 5-40 mL, 5-40 mL, IntraVENous, 2 times per day  sodium chloride flush 0.9 % injection 5-40 mL, 5-40 mL, IntraVENous, PRN  0.9 % sodium chloride infusion, , IntraVENous, PRN  acetaminophen (TYLENOL) tablet 650 mg, 650 mg, Oral, Q6H  morphine (PF) injection 2 mg, 2 mg, IntraVENous, Q2H PRN **OR** morphine sulfate (PF) injection 4 mg, 4 mg, IntraVENous, Q2H PRN  apixaban (ELIQUIS) tablet 2.5 mg, 2.5 mg, Oral, BID  acetaminophen (TYLENOL) tablet 650 mg, 650 mg, Oral, Q4H PRN  oxyCODONE (ROXICODONE) immediate release tablet 5 mg, 5 mg, Oral, Q4H PRN **OR** oxyCODONE (ROXICODONE) immediate release tablet 10 mg, 10 mg, Oral, Q4H PRN    Post-operatively the patient’s diet was advanced as tolerated and their dressing was changed on POD #1.  The incision is dressing in place, clean, dry, and intact with no signs of infection.  The patient remained neurovascularly intact in the left lower and had intact pulses distally.  Patient’s calf remained soft and showed no evidence of DVT.  The patient was able to move their left lower extremity without any problems post-operatively.  Physical therapy and occupational therapy were consulted and began working with the patient post-operatively.  The

## 2024-06-28 NOTE — PROGRESS NOTES
hand placement. Pt reported some nausea while ambulating, but it passed. His pain only flares up while moving it, and he is careful with movement. He will benefit from skilled PT intervention to address his impairments and increase his functional mobility to baseline. PT recommends discharge to home with 24/7 supervision/assist and HHPT.   Treatment Diagnosis: Decreased functional mobility  Therapy Prognosis: Good  Decision Making: Medium Complexity  Requires PT Follow-Up: Yes  Activity Tolerance  Activity Tolerance: Patient tolerated evaluation without incident;Patient limited by pain     Plan   Physical Therapy Plan  General Plan: 2 times a day 7 days a week  Current Treatment Recommendations: Strengthening, ROM, Balance training, Functional mobility training, Transfer training, Stair training, Gait training, Endurance training, Pain management, Home exercise program, Safety education & training, Patient/Caregiver education & training, Therapeutic activities  Safety Devices  Type of Devices: All fall risk precautions in place, Call light within reach, Chair alarm in place, Left in chair, Patient at risk for falls, Gait belt, Nurse notified  Restraints  Restraints Initially in Place: No     Restrictions  Restrictions/Precautions  Restrictions/Precautions: Fall Risk, Up as Tolerated, Weight Bearing  Lower Extremity Weight Bearing Restrictions  Left Lower Extremity Weight Bearing: Weight Bearing As Tolerated  Position Activity Restriction  Other position/activity restrictions: no positional restrictions per Dr. Alvarado's op-note; thigh high albino hose, IV     Subjective   Pain: 2/10 pain in L hip. 4-5/10 L hip pain with movement.  General  Chart Reviewed: Yes  Patient assessed for rehabilitation services?: Yes  Response To Previous Treatment: Not applicable  Family / Caregiver Present: No  Referring Practitioner: Abram Alvarado MD  Referral Date : 06/27/24  Diagnosis: Primary osteoarthritis of left hip; s/p left  sitting on the side of a flat bed without using bedrails?: A Little  How much help is needed moving to and from a bed to a chair?: A Lot  How much help is needed standing up from a chair using your arms?: A Lot  How much help is needed walking in hospital room?: A Lot  How much help is needed climbing 3-5 steps with a railing?: Total  AM-PAC Inpatient Mobility Raw Score : 13  AM-PAC Inpatient T-Scale Score : 36.74  Mobility Inpatient CMS 0-100% Score: 64.91  Mobility Inpatient CMS G-Code Modifier : CL    Goals  Short Term Goals  Time Frame for Short Term Goals: 7 days (7/05) unless otherwise stated  Short Term Goal 1: In 3 days, pt will perform 15 reps of 4 BLE TE. (7/01)  Short Term Goal 2: Pt will perform bed mobility with supervision.  Short Term Goal 3: Pt will perform all functional transfers with SBA.  Short Term Goal 4: Pt will ambulate 75ft with LRAD and SBA.  Short Term Goal 5: Pt will ascend/descend 4 stairs with SBA and use of R handrail.  Patient Goals   Patient Goals : \"Get rid of this pain\"     Education  Patient Education  Education Given To: Patient  Education Provided: Role of Therapy;Plan of Care;Home Exercise Program;Precautions;Transfer Training;Equipment  Education Provided Comments: Educated on anterior hip precautions, HEP for REINA, transfer/gait training with RW, and car transfers s/p REINA  Education Method: Verbal  Barriers to Learning: None  Education Outcome: Verbalized understanding    Patient Educated in safety with car transfers and  dispensed instruction on car transfers with use of assistive device with patient demonstrating:  [x] Verbalizing understanding of appropriate technique, maintaining any ordered precautions for car transfer training s/p TJR  [] Yates with simulated car transfer with walker  [x] He/she requires assist and will have someone at discharge to help with transfers with patient verbalizing understanding of any ordered TJR precautions employing appropriate

## 2024-06-28 NOTE — CONSULTS
RD received consult for BMI greater than 35. Pt reports good appetite, eating meals well. Provided general healthy nutrition education handout and encouraged lean protein intake. No additional questions at this time.    Will continue to monitor per nutrition standards of care.     Monik Addison, MS, RD, LD on 6/28/2024 at 12:01 PM  Office: [phone]

## 2024-06-28 NOTE — FLOWSHEET NOTE
Messaged on call ortho about Physical Therapy's eval and recommendation for pt not to discharge today. Response received that ortho will reassess tomorrow for DC.

## 2024-06-28 NOTE — PROGRESS NOTES
V2.0    [facility] [place] Progress Note      Name:  Mekhi Robles /Age/Sex: 1965  (59 y.o. male)   MRN & CSN:  [id number] & [id number] Encounter Date/Time: 2024 7:06 AM EDT   Location:  05 PCP: Suleman Navarro MD     Attending:Abram Alvarado MD       Hospital Day: 2    Assessment and Recommendations   Mekhi Robles is a 59 y.o. male with pmh of DANIELLE who presents with Primary osteoarthritis of left hip    Interval update: Patient states that his hip is better but does hurt when he gets up and moves.  He states current pain is 2/10 but when he gets up to move it is 4/10.  He denies any current chest pain, shortness of breath, nausea/vomiting at this time.      Plan:   Osteoarthritis of the left hip  -Status post left total hip arthroplasty with anterior approach.    -Ortho following  -Pain management per orthopedic surgery  -Patient currently on as needed morphine sulfate  -Consider transitioning to oral pain medication  -X-ray demonstrated L total hip arthroplasty and normal alignment  -Postop day 1 left anterior replacement  -Plan for DC home today per Ortho  -Eliquis for a month for DVT prophylaxis  -Follow-up with Ortho in 2 to 3 weeks for wound check    HTN  -Continue home meds: amlodipine, carvedilol and losartan  -Continue to monitor    DVT prophylaxis  -Continue Eliquis 2.5 mg    DANIELLE  -Patient will continue with home CPAP with home settings  -Monitor for excessive sedation  -Patient to use CPAP when sleeping      Diet ADULT DIET; Regular   DVT Prophylaxis [] Lovenox, []  Heparin, [] SCDs, [] Ambulation,  [x] Eliquis, [] Xarelto  [] Coumadin   Code Status Full Code   Disposition From: Home  Expected Disposition: Home  Estimated Date of Discharge: 1 to 2 days per Ortho  Patient requires continued admission due to s/p left total hip arthroplasty   Surrogate Decision Maker/ POA       Personally reviewed Lab Studies and Imaging         Subjective:     Chief Complaint: DANIELLE    Mekhi Robles is a 59  carvedilol  12.5 mg Oral BID WC    losartan  100 mg Oral Daily    amLODIPine  10 mg Oral Daily    sodium chloride flush  5-40 mL IntraVENous 2 times per day    acetaminophen  650 mg Oral Q6H    dexAMETHasone  8 mg IntraVENous Q8H    apixaban  2.5 mg Oral BID      Infusions:    sodium chloride 125 mL/hr at 06/27/24 1644    sodium chloride       PRN Meds: pantoprazole, 40 mg, Daily PRN  sodium chloride flush, 5-40 mL, PRN  sodium chloride, , PRN  morphine, 2 mg, Q2H PRN   Or  morphine, 4 mg, Q2H PRN  acetaminophen, 650 mg, Q4H PRN  oxyCODONE, 5 mg, Q4H PRN   Or  oxyCODONE, 10 mg, Q4H PRN        Labs and Imaging   XR PELVIS (1-2 VIEWS)    Result Date: 6/27/2024  EXAMINATION: ONE XRAY VIEW OF THE PELVIS 6/27/2024 3:12 pm COMPARISON: None. HISTORY: ORDERING SYSTEM PROVIDED HISTORY: POST-OP TECHNOLOGIST PROVIDED HISTORY: Please take True low set AP Pelvis in PACU. Reason for exam:->POST-OP Reason for Exam: post-op FINDINGS: Lower pelvis radiographs.  Left total hip arthroplasty which is normal in alignment.  No fracture or dislocation identified.  No soft tissue abnormality identified.     Left total hip arthroplasty which is normal in alignment.     XR HIP 2-3 VW W PELVIS LEFT    Result Date: 6/27/2024  Radiology exam is complete. No Radiologist dictation. Please follow up with ordering provider.     FLUORO FOR SURGICAL PROCEDURES    Result Date: 6/27/2024  Fluoro used for positioning purposes only.  No radiologist’s report.       CBC:   Recent Labs     06/28/24  0540   HGB 11.4*     BMP:    Recent Labs     06/28/24  0540      K 3.8      CO2 24   BUN 21*   CREATININE 1.1   GLUCOSE 145*     Hepatic: No results for input(s): \"AST\", \"ALT\", \"BILITOT\", \"ALKPHOS\" in the last 72 hours.    Invalid input(s): \"ALB\"  Lipids:   Lab Results   Component Value Date/Time    CHOL 152 10/23/2019 02:01 PM    HDL 60 10/23/2019 02:01 PM    HDL 46 01/26/2011 11:15 AM    TRIG 90 10/23/2019 02:01 PM     Hemoglobin A1C:   Lab Results

## 2024-06-28 NOTE — CARE COORDINATION
Case Management Assessment  Initial Evaluation    Date/Time of Evaluation: 6/28/2024 11:51 AM  Assessment Completed by: EZRA HENRIQUEZ RN    If patient is discharged prior to next notation, then this note serves as note for discharge by case management.    Patient Name: Mekhi Robles                   YOB: 1965  Diagnosis: Primary osteoarthritis of left hip [M16.12]  S/P total left hip arthroplasty [Z96.642]                   Date / Time: 6/27/2024  9:50 AM    Patient Admission Status: Observation   Readmission Risk (Low < 19, Mod (19-27), High > 27): No data recorded  Current PCP: Suleman Navarro MD  PCP verified by CM? Yes    Chart Reviewed: Yes      History Provided by: Patient  Patient Orientation: Alert and Oriented    Patient Cognition: Alert    Hospitalization in the last 30 days (Readmission):  No    If yes, Readmission Assessment in  Navigator will be completed.    Advance Directives:      Code Status: Full Code   Patient's Primary Decision Maker is: Legal Next of Kin      Discharge Planning:    Patient lives with: Spouse/Significant Other Type of Home: House  Primary Care Giver: Self  Patient Support Systems include: Spouse/Significant Other   Current Financial resources: None  Current community resources: None  Current services prior to admission: Durable Medical Equipment            Current DME: Cane, Shower Chair, Walker            Type of Home Care services:  OT, PT    ADLS  Prior functional level: Independent in ADLs/IADLs  Current functional level: Assistance with the following:, Cooking, Housework, Shopping, Dressing, Bathing, Mobility    PT AM-PAC:   /24  OT AM-PAC: 19 /24    Family can provide assistance at DC: Yes  Would you like Case Management to discuss the discharge plan with any other family members/significant others, and if so, who? No  Plans to Return to Present Housing: Yes  Other Identified Issues/Barriers to RETURNING to current housing: None at this time    Potential Assistance needed at discharge: Home Care            Potential DME:    Patient expects to discharge to: House  Plan for transportation at discharge: Family    Financial    Payor: [place] [employer] / Plan: [employer] - CHOICE PLU / Product Type: *No Product type* /     Does insurance require precert for SNF: Yes    Potential assistance Purchasing Medications: No  Meds-to-Beds request: Yes      [facility] - [place], [place] - [address] - P [phone] - F [phone]  [address] [address]  Phone: [phone] Fax: [phone]    [facility] - [place], [place] - [address] [phone] - F [phone]  [address]  [address]  [facility] [address]  Phone: [phone] Fax: [phone]    [facility] - [place], [place] - [address] -  [phone] - F [phone]  [address]  [facility] [address]  Phone: [phone] Fax: [phone]      Notes:    Factors facilitating achievement of predicted outcomes: Family support, Motivated, Cooperative, Pleasant, Sense of humor, Good insight into deficits, Has needed Durable Medical Equipment at home, and Knowledge about rehab    Barriers to discharge: NONE at this time    Additional Case Management Notes: Pt IPTA in a 2 story house w/spouse. Pt can live on main floor during recovery. Owns a RW,Shower chair and cane. Pt works and drives. Active PCP and Insurance. Will follow for needs as they arise.    D/c order noted. Referral pend for [facility]. Will follow for needs as they arise.    The Plan for Transition of Care is related to the following treatment goals of Primary osteoarthritis of left hip [M16.12]  S/P total left hip arthroplasty [Z96.642]    IF APPLICABLE: The Patient and/or patient representative Mekhi and his family were provided with a choice of provider and agrees with the discharge

## 2024-06-28 NOTE — PLAN OF CARE
Problem: Discharge Planning  Goal: Discharge to home or other facility with appropriate resources  Outcome: Progressing     Problem: Pain  Goal: Verbalizes/displays adequate comfort level or baseline comfort level  6/27/2024 2143 by Maria Del Carmen Mann  Outcome: Progressing  6/27/2024 1650 by Maribell Buchanan RN  Outcome: Progressing     Problem: ABCDS Injury Assessment  Goal: Absence of physical injury  Outcome: Progressing     Problem: Safety - Adult  Goal: Free from fall injury  Outcome: Progressing

## 2024-06-28 NOTE — CARE COORDINATION
[facility] accepted and will provide PT/OT. Electronically signed by EZRA HENRIQUEZ RN on 6/28/2024 at 1:12 PM

## 2024-06-28 NOTE — CARE COORDINATION
Per PT pt \"buckled\" while using therapy steps. Pt will not d/c today. Plan d/c home this weekend w/[facility]. Electronically signed by EZRA HENRIQUEZ RN on 6/28/2024 at 2:17 PM     SUBJECTIVE:  The patient Amado Zayas is a 77 year old male.  The patient is hear for evaluation of dyspnea and COPD.    Patient is a 77-year-old male with history of advanced Alzheimer's history of heart failure history of cardiac arrhythmia history of pacemaker history of being on Xarelto therapy gets short of breath on exertion in view of this advised to see us patient is being followed by multiple consultants and primary already        Review of Systems   Constitutional: Negative for activity change, appetite change, chills, diaphoresis, fatigue, fever and unexpected weight change.   HENT: Negative for congestion, mouth sores, nosebleeds, postnasal drip, rhinorrhea, sinus pressure, sinus pain, sneezing, sore throat, trouble swallowing and voice change.    Respiratory: Positive for cough, shortness of breath and wheezing. Negative for apnea, choking, chest tightness and stridor.    Cardiovascular: Negative for chest pain, palpitations and leg swelling.   Gastrointestinal: Negative for abdominal distention and abdominal pain.   Skin: Negative for pallor, rash and wound.   Allergic/Immunologic: Negative for environmental allergies, food allergies and immunocompromised state.       CURRENT MEDS:  Current Outpatient Medications   Medication Sig Dispense Refill   • amLODIPine (NORVASC) 2.5 MG tablet Take 1 tablet by mouth daily. 90 tablet 1   • lisinopril (ZESTRIL) 40 MG tablet Take 1 tablet by mouth daily. 90 tablet 3   • hydrochlorothiazide (HYDRODIURIL) 25 MG tablet Take 1 tablet by mouth daily. 90 tablet 2   • atorvastatin (LIPITOR) 40 MG tablet Take 1 tablet by mouth daily. 90 tablet 3   • rivaroxaban (XARELTO) 20 MG Tab Take 1 tablet by mouth daily (with dinner). 90 tablet 3   • apixaBAN (ELIQUIS) 5 MG Tab Take 1 tablet by mouth 2 times daily. 60 tablet 0   • Madeline, Morinda citrifolia, (MADELINE JUICE PO) 1 cup daily     • memantine (NAMENDA) 10 MG tablet Take 1 tablet by mouth 2 times daily. Do not start before  April 16, 2021. 60 tablet 11   • sertraline (ZOLOFT) 50 MG tablet TAKE 1 TABLET BY MOUTH EVERY DAY 90 tablet 1   • COD LIVER OIL PO Take 415 mg by mouth daily. Takes 4 tablets a day     • Ascorbic Acid (vitamin C) 1000 MG tablet Take 1,000 mg by mouth daily. Takes 2 tablets daily     • OREGANO PO Pt and spouse unable to see dose on bottle     • metformin (GLUCOPHAGE) 1000 MG tablet Take 1 tablet by mouth 2 times daily (with meals). 180 tablet 1   • tamsulosin (FLOMAX) 0.4 MG Cap Take 1 capsule by mouth daily after a meal. 90 capsule 1   • dutasteride (AVODART) 0.5 MG capsule Take 1 capsule by mouth daily. (Patient taking differently: Take 0.5 mg by mouth daily. Indications: Benign Enlargement of Prostate ) 90 capsule 1   • donepezil (ARICEPT) 10 MG tablet Take 1 tablet by mouth nightly. 90 tablet 1   • ipratropium-albuterol (DUONEB) 0.5-2.5 (3) MG/3ML nebulizer solution Take 3 mLs by nebulization every 6 hours as needed for Wheezing. 360 mL 11     No current facility-administered medications for this visit.       HISTORIES:    Past Medical History:   Diagnosis Date   • BPH (benign prostatic hyperplasia)    • Diabetes mellitus (CMS/HCC)    • Essential (primary) hypertension    • High cholesterol    • OA (osteoarthritis)    • Pneumonia    • Sleep apnea    • Uncomplicated senile dementia (CMS/HCC)    • Ventral hernia without obstruction or gangrene 5/9/2016      Past Surgical History:   Procedure Laterality Date   • Joint replacement      Both Hips Replaced   • Shoulder surgery        ALLERGIES:  No Known Allergies   Social History     Tobacco Use   • Smoking status: Former Smoker   • Smokeless tobacco: Never Used   • Tobacco comment: only for 2 years    Substance Use Topics   • Alcohol use: Yes     Alcohol/week: 12.0 standard drinks     Types: 12 Cans of beer per week     Comment: Occasionally - 12 pack beer a week      Family History   Problem Relation Age of Onset   • Patient is unaware of any medical problems  Mother    • Patient is unaware of any medical problems Father             OBJECTIVE:  Visit Vitals  BP (!) 150/76 (BP Location: LUE - Left upper extremity, Patient Position: Sitting, Cuff Size: Large Adult)   Pulse (!) 50   Ht 5' 10\" (1.778 m)   Wt 127 kg (280 lb)   SpO2 98%   BMI 40.18 kg/m²       Physical Exam   Constitutional: He appears well-developed and well-nourished. No distress.   Well-developed male distress comfortable overweight patient has sleep apnea not using the machine properly   HENT:   Head: Normocephalic.   Mouth/Throat: Oropharynx is clear and moist.   Neck: No JVD present. No tracheal deviation present. No thyromegaly present.   Cardiovascular: Normal rate, regular rhythm, normal heart sounds and intact distal pulses. Exam reveals no gallop.   No murmur heard.  Pulmonary/Chest: No accessory muscle usage or stridor. No respiratory distress. He has no decreased breath sounds. He has no wheezes. He has no rhonchi. He has no rales.   Decreased breath sounds on both sides crackles and wheeze bilaterally   Musculoskeletal:      Cervical back: Normal range of motion and neck supple.      Comments: Trace peripheral edema   Lymphadenopathy:     He has no cervical adenopathy.   Nursing note and vitals reviewed.       ASSESSMENT:  Patient appears to have multiple problems.    Appears to have advanced dementia but is still functional very forgetful    Current problem probably secondary to COPD with bronchospasm  PLAN:  Patient advised to use the CPAP mask regularly    Advised to stay on all current medications.    Due to dementia I do not think patient can use inhalers but at least we can give him an nebulizer machine to use DuoNeb at least 3 times a day.    Order for nebulizer machine to be delivered by home care services placed in the computer.    Prescription for DuoNeb sent to the pharmacy.    I do not see a new chest x-ray on the patient advisable to get new chest x-ray.    Wife informed to bring the  patient in couple months for reevaluation  5/13/2021    Alexis Baires MD

## 2024-06-28 NOTE — PROGRESS NOTES
Department of Orthopedic Surgery  Progress Note      SUBJECTIVE: No issues overnight.  Pain controlled.  Working with therapy today.-Some issues contrasting the leg urinalysis but overall things are going well.    OBJECTIVE    Physical dressing clean dry and intact.  No neurovascular point distally.      VITALS:  BP (!) 166/96   Pulse 81   Temp 97.9 °F (36.6 °C) (Oral)   Resp 18   Ht 1.88 m (6' 2\")   Wt (!) 170.1 kg (375 lb)   SpO2 96%   BMI 48.15 kg/m²   24HR INTAKE/OUTPUT:      Intake/Output Summary (Last 24 hours) at 6/28/2024 0952  Last data filed at 6/28/2024 0111  Gross per 24 hour   Intake 1610 ml   Output 700 ml   Net 910 ml     URINARY CATHETER OUTPUT ([place]):         Data    CBC:   Lab Results   Component Value Date/Time    WBC 7.1 06/05/2024 03:15 PM    RBC 3.67 06/05/2024 03:15 PM    HGB 11.4 06/28/2024 05:40 AM    HCT 34.7 06/28/2024 05:40 AM    MCV 98.1 06/05/2024 03:15 PM    MCH 34.2 06/05/2024 03:15 PM    MCHC 34.9 06/05/2024 03:15 PM    RDW 14.4 06/05/2024 03:15 PM     06/05/2024 03:15 PM    MPV 8.8 06/05/2024 03:15 PM     BMP:    Lab Results   Component Value Date/Time     06/28/2024 05:40 AM    K 3.8 06/28/2024 05:40 AM     06/28/2024 05:40 AM    CO2 24 06/28/2024 05:40 AM    BUN 21 06/28/2024 05:40 AM    CREATININE 1.1 06/28/2024 05:40 AM    CALCIUM 8.3 06/28/2024 05:40 AM    GFRAA >60 05/18/2022 10:37 AM    GFRAA >60 08/17/2012 11:35 AM    LABGLOM 77 06/28/2024 05:40 AM    GLUCOSE 145 06/28/2024 05:40 AM     Current Inpatient Medications    Current Facility-Administered Medications: therapeutic multivitamin-minerals 1 tablet, 1 tablet, Oral, Daily  carvedilol (COREG) tablet 12.5 mg, 12.5 mg, Oral, BID WC  losartan (COZAAR) tablet 100 mg, 100 mg, Oral, Daily  amLODIPine (NORVASC) tablet 10 mg, 10 mg, Oral, Daily  pantoprazole (PROTONIX) tablet 40 mg, 40 mg, Oral, Daily PRN  0.9 % sodium chloride infusion, , IntraVENous, Continuous  sodium chloride flush 0.9 % injection

## 2024-06-28 NOTE — PLAN OF CARE
Pt resting in bed quietly. Bed in lowest position, wheels locked, side rails up X2, non skid socks on. Bed check alarm engaged. Pt instructed not to get out of bed on own, to use call light for staff assistance when ambulating or other needs. Pt verbalizes understanding. Call light within reach. Will continue to monitor.     Problem: Safety - Adult  Goal: Free from fall injury  6/28/2024 1113 by Joyce Jarquin, RN  Outcome: Progressing    Problem: ABCDS Injury Assessment  Goal: Absence of physical injury  6/28/2024 1113 by Joyce Jarquin, RN  Outcome: Progressing       Pt rates pain level using 0-10 pain scale. Pain meds administered as ordered per MAR. Pt instructed to use call light for increasing pain or ineffective pain management. Pt verbalizes understanding. Call light within reach. Will continue to monitor.     Problem: Pain  Goal: Verbalizes/displays adequate comfort level or baseline comfort level  6/28/2024 1113 by Joyce Jarquin, RN  Outcome: Progressing  Flowsheets (Taken 6/27/2024 2350 by Maria Del Carmen Mann)  Verbalizes/displays adequate comfort level or baseline comfort level: Encourage patient to monitor pain and request assistance     Problem: Pain  Goal: Verbalizes/displays adequate comfort level or baseline comfort level  6/28/2024 1113 by Joyce Jarquin, RN  Outcome: Progressing  Verbalizes/displays adequate comfort level or baseline comfort level: Encourage patient to monitor pain and request assistance

## 2024-06-28 NOTE — PROGRESS NOTES
Occupational Therapy  Facility/Department: [facility] [address] - MED SURG/ORTHO  Occupational Therapy Initial Assessment and Treatment Note    Name: Mekhi Robles  : 1965  MRN: [id number]  Date of Service: 2024    Discharge Recommendations:  Continue to assess pending progress (anticipate home 24hrSPV and HHOT)  OT Equipment Recommendations  Equipment Needed: Yes  Mobility Devices: ADL Assistive Devices  ADL Assistive Devices: Shower Chair with back (will need shower chair with back if pt does not have one (states he did have one, but may have given it to a friend))     If pt is unable to be seen after this session, please let this note serve as discharge summary.  Please see case management note for discharge disposition.  Thank you.    Patient Diagnosis(es): The primary encounter diagnosis was S/P total left hip arthroplasty. A diagnosis of Primary osteoarthritis of both shoulders was also pertinent to this visit.  Past Medical History:  has a past medical history of Arthritis, Chest pain, Essential hypertension, benign, GERD (gastroesophageal reflux disease), H/O: Bell's palsy, Morbid obesity (HCC), DANIELLE on CPAP, Primary cardiomyopathy (HCC), Unspecified sleep apnea, and Wears glasses.  Past Surgical History:  has a past surgical history that includes Gastric Band (); knee surgery (Bilateral,  and   ); Colonoscopy (2017); Total knee arthroplasty (Bilateral, 2022); Dental surgery; and Total hip arthroplasty (Left, 2024).       Assessment   Performance deficits / Impairments: Decreased functional mobility ;Decreased ADL status;Decreased balance;Decreased posture  Assessment: Pt was seen for OT eval s/p L REINA on . At baseline, pt lives with his wife and 2 adult children. He is IND with ADLs, IADLs, functional transfers, and functional mobility. Today, pt required SBA for bed mobility, Janet for sit<>stand transfers, and Janet for stand-step transfer from EOB to chair using RW. He required Janet

## 2024-06-28 NOTE — PROGRESS NOTES
Physical Therapy  Facility/Department: [facility] - MED SURG/ORTHO  Daily Treatment Note  NAME: Mekhi Robles  : 1965  MRN: [id number]    Date of Service: 2024    Discharge Recommendations:  24 hour supervision or assist, Home with Home health PT   PT Equipment Recommendations  Equipment Needed: No  Other: Pt has RW at home    Patient Diagnosis(es): The primary encounter diagnosis was S/P total left hip arthroplasty. A diagnosis of Primary osteoarthritis of both shoulders was also pertinent to this visit.    Assessment   Assessment: During this session, pt required mod assist for sit<>stand transfers, but exemplified decreased pain. Training 1 stair was attempted. Pt was educated on appropriate technique for ascending/descending stairs s/p LTHA. Pt was able to get R leg onto the stair, but LLE gave out, requiring max assist x2 to get the pt seated back into the chair. Pt performed seated LE HEP in chair. Pt expressed concerns about being discharged home at this time due to inability to ascend/descend stairs. RN notified. He will continue to benefit from skilled PT intervention to address his impairments, progress towards his goals, and increase his functional mobility to baseline so he can safely return home. PT recommends discharge to home with 24hr supervision/assist and HHPT.   Activity Tolerance: Patient limited by pain  Equipment Needed: No  Other: Pt has RW at home     Plan    Physical Therapy Plan  General Plan: 2 times a day 7 days a week  Current Treatment Recommendations: Strengthening;ROM;Balance training;Functional mobility training;Transfer training;Stair training;Gait training;Endurance training;Pain management;Home exercise program;Safety education & training;Patient/Caregiver education & training;Therapeutic activities     Restrictions  Restrictions/Precautions  Restrictions/Precautions: Fall Risk, Up as Tolerated, Weight Bearing  Lower Extremity Weight Bearing Restrictions  Left Lower

## 2024-06-29 VITALS
TEMPERATURE: 97.8 F | WEIGHT: 315 LBS | HEART RATE: 88 BPM | BODY MASS INDEX: 40.43 KG/M2 | DIASTOLIC BLOOD PRESSURE: 79 MMHG | RESPIRATION RATE: 16 BRPM | SYSTOLIC BLOOD PRESSURE: 155 MMHG | OXYGEN SATURATION: 97 % | HEIGHT: 74 IN

## 2024-06-29 PROCEDURE — G0378 HOSPITAL OBSERVATION PER HR: HCPCS

## 2024-06-29 PROCEDURE — 97110 THERAPEUTIC EXERCISES: CPT

## 2024-06-29 PROCEDURE — 6370000000 HC RX 637 (ALT 250 FOR IP): Performed by: STUDENT IN AN ORGANIZED HEALTH CARE EDUCATION/TRAINING PROGRAM

## 2024-06-29 PROCEDURE — 99024 POSTOP FOLLOW-UP VISIT: CPT | Performed by: PHYSICIAN ASSISTANT

## 2024-06-29 PROCEDURE — 97530 THERAPEUTIC ACTIVITIES: CPT

## 2024-06-29 PROCEDURE — 2580000003 HC RX 258: Performed by: STUDENT IN AN ORGANIZED HEALTH CARE EDUCATION/TRAINING PROGRAM

## 2024-06-29 PROCEDURE — 6370000000 HC RX 637 (ALT 250 FOR IP): Performed by: ORTHOPAEDIC SURGERY

## 2024-06-29 PROCEDURE — 97116 GAIT TRAINING THERAPY: CPT

## 2024-06-29 RX ADMIN — CARVEDILOL 12.5 MG: 6.25 TABLET, FILM COATED ORAL at 09:46

## 2024-06-29 RX ADMIN — APIXABAN 2.5 MG: 2.5 TABLET, FILM COATED ORAL at 09:49

## 2024-06-29 RX ADMIN — AMLODIPINE BESYLATE 10 MG: 5 TABLET ORAL at 09:46

## 2024-06-29 RX ADMIN — LOSARTAN POTASSIUM 100 MG: 100 TABLET, FILM COATED ORAL at 09:46

## 2024-06-29 RX ADMIN — OXYCODONE 5 MG: 5 TABLET ORAL at 13:24

## 2024-06-29 RX ADMIN — OXYCODONE 5 MG: 5 TABLET ORAL at 07:11

## 2024-06-29 RX ADMIN — Medication 1 TABLET: at 09:46

## 2024-06-29 RX ADMIN — ACETAMINOPHEN 650 MG: 325 TABLET ORAL at 09:46

## 2024-06-29 RX ADMIN — SODIUM CHLORIDE, PRESERVATIVE FREE 10 ML: 5 INJECTION INTRAVENOUS at 09:48

## 2024-06-29 ASSESSMENT — PAIN - FUNCTIONAL ASSESSMENT: PAIN_FUNCTIONAL_ASSESSMENT: PREVENTS OR INTERFERES SOME ACTIVE ACTIVITIES AND ADLS

## 2024-06-29 ASSESSMENT — PAIN SCALES - GENERAL
PAINLEVEL_OUTOF10: 0
PAINLEVEL_OUTOF10: 6
PAINLEVEL_OUTOF10: 4

## 2024-06-29 ASSESSMENT — PAIN DESCRIPTION - DESCRIPTORS: DESCRIPTORS: ACHING;SPASM

## 2024-06-29 ASSESSMENT — PAIN DESCRIPTION - ORIENTATION: ORIENTATION: LEFT

## 2024-06-29 ASSESSMENT — PAIN DESCRIPTION - LOCATION: LOCATION: HIP

## 2024-06-29 NOTE — PROGRESS NOTES
Physical Therapy  Facility/Department: [facility] C5 - MED SURG/ORTHO  Daily Treatment Note  NAME: Mekhi Robles  : 1965  MRN: [id number]    Date of Service: 2024    Discharge Recommendations:  24 hour supervision or assist, Home with Home health PT   PT Equipment Recommendations  Equipment Needed: No  Other: Pt has RW at home  If pt discharges prior to next PT session this note will serve as discharge summary.   Patient Diagnosis(es): The primary encounter diagnosis was S/P total left hip arthroplasty. A diagnosis of Primary osteoarthritis of both shoulders was also pertinent to this visit.    Assessment   Assessment: Pt is POD 2 after anterior approach L THR, no SLR, WBAT. Pt agrees to therapy, rates pain 1-2/10 L hip and reports numbness L superior/lateral thigh area. Pt performed bed mob with min assist for LLE when exiting bed to his right. Transfers sit><stand CG. Pt amb 110 ft with RW, CG and cues for sequence and body positioning within walker frame. Pt was tired with ambulation. He participated in 15-20 reps LE Ex. He voices understanding of no SLR and WBAT as well as general safety and up with staff assist only. Pt will benefit from skilled PT to address deficits. Recommend home with 24 hr assist initially and home PT  Activity Tolerance: Patient tolerated treatment well  Equipment Needed: No  Other: Pt has RW at home     Plan    Physical Therapy Plan  General Plan: 2 times a day 7 days a week  Current Treatment Recommendations: Strengthening;ROM;Balance training;Functional mobility training;Transfer training;Stair training;Gait training;Endurance training;Pain management;Home exercise program;Safety education & training;Patient/Caregiver education & training;Therapeutic activities     Restrictions  Restrictions/Precautions  Restrictions/Precautions: Fall Risk, Up as Tolerated, Weight Bearing  Lower Extremity Weight Bearing Restrictions  Left Lower Extremity Weight Bearing: Weight Bearing As

## 2024-06-29 NOTE — PROGRESS NOTES
& training     Restrictions  Restrictions/Precautions  Restrictions/Precautions: Fall Risk, Up as Tolerated, Weight Bearing  Lower Extremity Weight Bearing Restrictions  Left Lower Extremity Weight Bearing: Weight Bearing As Tolerated  Position Activity Restriction  Other position/activity restrictions: no positional restrictions per Dr. Alvarado's op-note; thigh high albino hose, IV    Subjective   General  Chart Reviewed: Yes  Patient assessed for rehabilitation services?: Yes  Family / Caregiver Present: No  Referring Practitioner: Abram Alvarado MD  Diagnosis: L REINA  Subjective  Subjective: Pt resting in bed upon OT arrival, agreeable to eval and treatment  General Comment  Comments: RN cleared pt for OT; pt in therapy gym with PT for stair training; pt agreeable to OT  Pt reports 1-2/10 pain L hip at rest      Objective   Temp: 97.8 °F (36.6 °C)  Pulse: 88  Heart Rate Source: Monitor  Respirations: 16  SpO2: 97 %  O2 Device: None (Room air)  BP: (!) 155/79  MAP (Calculated): 104  BP Location: Right lower arm  Patient Position: Sitting             Safety Devices  Type of Devices: Gait belt;Nurse notified;Left in bed;Call light within reach;Patient at risk for falls;Bed alarm in place  Restraints  Restraints Initially in Place: No          ADL  Toileting: Modified independent   Toileting Skilled Clinical Factors: per pt report (using urinal)       Activity Tolerance  Activity Tolerance: Patient tolerated treatment well  Bed mobility  Supine to Sit: Unable to assess (already up in w/c)  Sit to Supine: Minimal assistance (for lifting Left LE into bed)  Scooting: Modified independent  Transfers  Sit to stand: Stand by assistance  Stand to sit: Stand by assistance  Transfer Comments: min cues for safe hand placement     Orientation  Overall Orientation Status: Within Functional Limits  Orientation Level: Oriented X4                  Education Given To: Patient  Education Provided: Role of Therapy;Plan of  ankle pain/injury

## 2024-06-29 NOTE — PROGRESS NOTES
Pt provided with discharge instructions. All questions answered. Wife will provide transportation.

## 2024-06-29 NOTE — PLAN OF CARE
Problem: Musculoskeletal - Adult  Goal: Return mobility to safest level of function  6/29/2024 0931 by Verónica Troncoso RN  Flowsheets (Taken 6/29/2024 0931)  Return Mobility to Safest Level of Function: Assess patient stability and activity tolerance for standing, transferring and ambulating with or without assistive devices  6/28/2024 2304 by Maria Del Carmen Mann  Outcome: Progressing     Problem: Pain  Goal: Verbalizes/displays adequate comfort level or baseline comfort level  Recent Flowsheet Documentation  Taken 6/29/2024 0931 by Verónica Troncoso RN  Verbalizes/displays adequate comfort level or baseline comfort level:   Encourage patient to monitor pain and request assistance   Administer analgesics based on type and severity of pain and evaluate response   Assess pain using appropriate pain scale

## 2024-06-29 NOTE — PROGRESS NOTES
V2.0    [facility] [place] Progress [place]      Name:  Mekhi Robles /Age/Sex: 1965  (59 y.o. male)   MRN & CSN:  [id number] & [id number] Encounter Date/Time: 2024 7:06 AM EDT   Location:   PCP: Suleman Navarro MD     Attending:Abram Alvarado MD       Hospital Day: 3    Assessment and Recommendations   Mekhi Robles is a 59 y.o. male with pmh of DANIELLE who presents with Primary osteoarthritis of left hip    Interval update: Patient was seen sitting in chair.  He states he is doing well but working on the steps was a problem.  He states he has discussed with his wife how to use the handrails and a strap for getting up into the house to the garage.  He is looking forward to going home if possible.    Plan:   Osteoarthritis of the left hip  -Status post left total hip arthroplasty with anterior approach.    -Ortho following  -Pain management per orthopedic surgery  -Patient currently on as needed morphine sulfate  -Consider transitioning to oral pain medication  -X-ray demonstrated L total hip arthroplasty and normal alignment  -Postop day 1 left anterior replacement  -Plan for DC home today per Ortho  -Eliquis for a month for DVT prophylaxis  -Follow-up with Ortho in 2 to 3 weeks for wound check  -Patient \"buckled\" per PT while using therapy steps, DC delayed on  possible DC on , Ortho to reevaluate  -Patient to be DC'd with follow-up in 2 weeks per Ortho    HTN  -Continue home meds: amlodipine, carvedilol and losartan  -Continue to monitor    DVT prophylaxis  -Continue Eliquis 2.5 mg    DANIELLE  -Patient will continue with home CPAP with home settings  -Monitor for excessive sedation  -Patient to use CPAP when sleeping      Diet ADULT DIET; Regular   DVT Prophylaxis [] Lovenox, []  Heparin, [] SCDs, [] Ambulation,  [x] Eliquis, [] Xarelto  [] Coumadin   Code Status Full Code   Disposition From: Home  Expected Disposition: [facility]  Estimated Date of Discharge: 1 to 2 days per Ortho  Patient

## 2024-06-29 NOTE — PROGRESS NOTES
Physical Therapy  Facility/Department: [facility] C5 - MED SURG/ORTHO  Daily Treatment Note  NAME: Mekhi Robles  : 1965  MRN: [id number]    Date of Service: 2024    Discharge Recommendations:  24 hour supervision or assist, Home with Home health PT   PT Equipment Recommendations  Equipment Needed: No  Other: Pt has RW at home  If pt discharges prior to next PT session this note will serve as discharge summary.     Patient Diagnosis(es): The primary encounter diagnosis was S/P total left hip arthroplasty. A diagnosis of Primary osteoarthritis of both shoulders was also pertinent to this visit.    Assessment   Assessment: Pt is POD2 post L THR, ant approach, WBAT, No SLR. Pt agreeable to therapy, eager to try stairs, rates pain 1-2/10. This afternoon pt requried only stand by assistance for bed mob, transfers and ambulation with RW.  He negotiated a six inch step 4 x using B rails with Contact Guard. Pt plans to discharge home with family this afternoon and voices all questions answered.  Activity Tolerance: Patient tolerated treatment well  Equipment Needed: No  Other: Pt has RW at home     Plan    Physical Therapy Plan  General Plan: 2 times a day 7 days a week  Current Treatment Recommendations: Strengthening;ROM;Balance training;Functional mobility training;Transfer training;Stair training;Gait training;Endurance training;Pain management;Home exercise program;Safety education & training;Patient/Caregiver education & training;Therapeutic activities     Restrictions  Restrictions/Precautions  Restrictions/Precautions: Fall Risk, Up as Tolerated, Weight Bearing  Lower Extremity Weight Bearing Restrictions  Left Lower Extremity Weight Bearing: Weight Bearing As Tolerated  Position Activity Restriction  Other position/activity restrictions: no positional restrictions per Dr. Alvarado's op-note; thigh high albino hose, ELEAZAR     Subjective    Subjective  Subjective: Pt agrees to therapy, plans to discharge home around  2pm  Pain: Pt reports 1-2/10 pain L hip at rest  Orientation  Overall Orientation Status: Within Functional Limits  Orientation Level: Oriented X4     Objective   Vitals  Pulse: 88  Heart Rate Source: Monitor  BP: (!) 155/79  BP Location: Right lower arm  MAP (Calculated): 104  SpO2: 97 %  O2 Device: None (Room air)  Bed Mobility Training  Bed Mobility Training: Yes  Overall Level of Assistance: Stand-by assistance;Additional time;Adaptive equipment (HOB partially elevated, use of bed rail)  Interventions: Verbal cues;Tactile cues;Safety awareness training  Supine to Sit: Stand-by assistance  Sit to Supine: Stand-by assistance;Additional time;Adaptive equipment  Balance  Sitting: Intact  Standing: Impaired;With support  Transfer Training  Transfer Training: Yes  Overall Level of Assistance: Stand-by assistance;Additional time  Sit to Stand: Stand-by assistance;Additional time  Stand to Sit: Contact-guard assistance;Additional time  Gait Training  Left Side Weight Bearing: As tolerated  Gait  Gait Training: Yes  Left Side Weight Bearing: As tolerated  Overall Level of Assistance: Stand-by assistance  Distance (ft):  (65 ft + 15 ft (pt amb 110 ft during morning session))  Assistive Device: Walker, rolling;Gait belt  Interventions: Safety awareness training;Verbal cues  Speed/Parul: Slow  Step Length: Left lengthened;Right shortened  Stance: Left decreased  Gait Abnormalities: Step to gait  Rail Use: Both  Stairs - Level of Assistance: Contact-guard assistance  Number of Stairs Trained: 4     PT Exercises  Exercise Treatment: Omitted during pm session, oral review. Focus of pm session was stair negotiation and gait  Other Specialty Interventions  Other Treatments/Modalities: MORGAN hose donned  Safety Devices  Type of Devices: Gait belt;All fall risk precautions in place;Patient at risk for falls;Bed alarm in place;Left in bed;Call light within reach;Nurse notified       Goals  Short Term Goals  Time Frame for Short

## 2024-06-29 NOTE — PLAN OF CARE
Problem: Discharge Planning  Goal: Discharge to home or other facility with appropriate resources  6/28/2024 1645 by Joyce Jarquin, RN  Outcome: Completed  6/28/2024 1113 by Joyce Jarquin RN  Outcome: Progressing  Flowsheets (Taken 6/27/2024 2351 by Maria Del Carmen Mann)  Discharge to home or other facility with appropriate resources: Identify barriers to discharge with patient and caregiver     Problem: Pain  Goal: Verbalizes/displays adequate comfort level or baseline comfort level  6/28/2024 1645 by Joyce Jarquin, RN  Outcome: Completed  6/28/2024 1113 by Joyce Jarquin, RN  Outcome: Progressing  Flowsheets (Taken 6/27/2024 2350 by Maria Del Carmen Mann)  Verbalizes/displays adequate comfort level or baseline comfort level: Encourage patient to monitor pain and request assistance     Problem: ABCDS Injury Assessment  Goal: Absence of physical injury  6/28/2024 1645 by Joyce Jarquin, RN  Outcome: Completed  6/28/2024 1113 by Joyce Jarquin, RN  Outcome: Progressing     Problem: Safety - Adult  Goal: Free from fall injury  6/28/2024 1645 by Joyce Jarquin, RN  Outcome: Completed  6/28/2024 1113 by Joyce Jarquin RN  Outcome: Progressing

## 2024-07-01 ENCOUNTER — TELEPHONE (OUTPATIENT)
Dept: FAMILY MEDICINE CLINIC | Age: 59
End: 2024-07-01

## 2024-07-01 NOTE — TELEPHONE ENCOUNTER
Care Transitions Initial Follow Up Call    Outreach made within 2 business days of discharge: Yes    Patient: Mekhi Robles Patient : 1965   MRN: [id number]  Reason for Admission: There are no discharge diagnoses documented for the most recent discharge.  Discharge Date: 24       Spoke with: Planned orthopedic surg. Followed by ortho nurse navigator     Discharge department/facility:     [place] [facility] Interactive Patient Contact:  Was patient able to fill all prescriptions:   Was patient instructed to bring all medications to the follow-up visit:   Is patient taking all medications as directed in the discharge summary?   Does patient understand their discharge instructions:   Does patient have questions or concerns that need addressed prior to 7-14 day follow up office visit:     Scheduled appointment with PCP within 7-14 days    Follow Up  Future Appointments   Date Time Provider Department Center   7/10/2024  9:15 AM Abram Alvarado MD [facility]   2024  9:00 AM Bear Amin MD [facility] Mindy Schmidt, MA

## 2024-07-02 ENCOUNTER — TELEPHONE (OUTPATIENT)
Dept: FAMILY MEDICINE CLINIC | Age: 59
End: 2024-07-02

## 2024-07-02 NOTE — TELEPHONE ENCOUNTER
Care Transitions Initial Follow Up Call    Outreach made within 2 business days of discharge: Yes    Patient: Mekhi Robles Patient : 1965   MRN: [id number]  Reason for Admission: There are no discharge diagnoses documented for the most recent discharge.  Discharge Date: 24       Spoke with:  Planned orthopedic surg. Followed by ortho nurse navigator     Discharge department/facility:     [facility] Interactive Patient Contact:  Was patient able to fill all prescriptions:   Was patient instructed to bring all medications to the follow-up visit:   Is patient taking all medications as directed in the discharge summary?   Does patient understand their discharge instructions:   Does patient have questions or concerns that need addressed prior to 7-14 day follow up office visit:     Scheduled appointment with PCP within 7-14 days    Follow Up  Future Appointments   Date Time Provider Department Center   7/10/2024  9:15 AM Abram Alvarado MD [facility]   2024  9:00 AM Bear Amin MD [facility] Mindy Schmidt, MA

## 2024-07-05 ENCOUNTER — TELEPHONE (OUTPATIENT)
Dept: ORTHOPEDIC SURGERY | Age: 59
End: 2024-07-05

## 2024-07-05 NOTE — TELEPHONE ENCOUNTER
Medical Facility Question     Facility Name: [facility]  Contact Name: QAMAR  Contact Number: [phone]  Request or Information:       THE FACILITY REP WANTED TO LET DR STATON KNOW THAT THE PT CANCELED HIS HOME THERAPY TODAY BECAUSE HE FELT ACHY, SORE AND NOT IN THE MOOD.

## 2024-07-10 ENCOUNTER — OFFICE VISIT (OUTPATIENT)
Dept: ORTHOPEDIC SURGERY | Age: 59
End: 2024-07-10

## 2024-07-10 DIAGNOSIS — Z96.642 S/P TOTAL LEFT HIP ARTHROPLASTY: Primary | ICD-10-CM

## 2024-07-10 PROCEDURE — 99024 POSTOP FOLLOW-UP VISIT: CPT | Performed by: STUDENT IN AN ORGANIZED HEALTH CARE EDUCATION/TRAINING PROGRAM

## 2024-07-10 RX ORDER — OXYCODONE HYDROCHLORIDE 5 MG/1
5 TABLET ORAL EVERY 6 HOURS PRN
Qty: 28 TABLET | Refills: 0 | Status: SHIPPED | OUTPATIENT
Start: 2024-07-10 | End: 2024-07-17

## 2024-07-10 NOTE — PROGRESS NOTES
History: 59-year-old male presents for follow-up after left anterior hip replacement June 27.  He is been doing well, minimal pain.  Doing well with outpatient therapy.  Still using the walker for ambulation most the time.  Noting some weakness in feeling like he still has a ways to go with recovery.  Continue on Eliquis for DVT prophylaxis.  Using oxycodone sparingly.  No new issues otherwise.    Exam: Incisions healing very well without erythema or drainage.  There is minimal tenderness about the incision.  No neurovascular compromise distally.  Soft tissue swelling is well-controlled.    Imaging: Postop imaging reviewed with the patient    Assessment: 59-year-old male status post left hip replacement June 27, doing well.    Plan: I do think he will benefit from outpatient therapy for hip and thigh muscle strengthening, gait training, work on getting off assistive devices.  Oxycodone refilled today.  Follow-up in a month to assess progress with mobility.    Abram Alvarado MD

## 2024-07-12 ENCOUNTER — TELEPHONE (OUTPATIENT)
Dept: ORTHOPEDIC SURGERY | Age: 59
End: 2024-07-12

## 2024-07-12 ENCOUNTER — OFFICE VISIT (OUTPATIENT)
Dept: ORTHOPEDIC SURGERY | Age: 59
End: 2024-07-12

## 2024-07-12 VITALS — HEIGHT: 74 IN | BODY MASS INDEX: 40.43 KG/M2 | WEIGHT: 315 LBS

## 2024-07-12 DIAGNOSIS — M19.012 PRIMARY OSTEOARTHRITIS OF LEFT SHOULDER: Primary | ICD-10-CM

## 2024-07-12 RX ORDER — BUPIVACAINE HYDROCHLORIDE 2.5 MG/ML
30 INJECTION, SOLUTION INFILTRATION; PERINEURAL ONCE
Status: COMPLETED | OUTPATIENT
Start: 2024-07-12 | End: 2024-07-12

## 2024-07-12 RX ORDER — TRIAMCINOLONE ACETONIDE 40 MG/ML
40 INJECTION, SUSPENSION INTRA-ARTICULAR; INTRAMUSCULAR ONCE
Status: COMPLETED | OUTPATIENT
Start: 2024-07-12 | End: 2024-07-12

## 2024-07-12 RX ORDER — LIDOCAINE HYDROCHLORIDE 10 MG/ML
5 INJECTION, SOLUTION INFILTRATION; PERINEURAL ONCE
Status: COMPLETED | OUTPATIENT
Start: 2024-07-12 | End: 2024-07-12

## 2024-07-12 RX ADMIN — BUPIVACAINE HYDROCHLORIDE 75 MG: 2.5 INJECTION, SOLUTION INFILTRATION; PERINEURAL at 09:35

## 2024-07-12 RX ADMIN — TRIAMCINOLONE ACETONIDE 40 MG: 40 INJECTION, SUSPENSION INTRA-ARTICULAR; INTRAMUSCULAR at 09:36

## 2024-07-12 RX ADMIN — LIDOCAINE HYDROCHLORIDE 5 ML: 10 INJECTION, SOLUTION INFILTRATION; PERINEURAL at 09:36

## 2024-07-12 NOTE — PROGRESS NOTES
Dr Bear Amin      Date /Time 7/12/2024             9:14 AM EDT  Name Mekhi Robles             1965   [facility]  MRN [id number]                Chief Complaint   Patient presents with    Shoulder Pain     NP Left Shoulder  Referred by Dr. Sanz        History of Present Illness    Mekhi Robles is a 59 y.o. male who presents with  left Shoulder pain.    Sent in consultation by Suleman Navarro MD, .      Athletic/exercise activity: no sports.  Injury Mechanism:  none.  Worker's Comp. & legal issues:   none.  Previous Treatments: Ice, Heat, and NSAIDs    Patient presents to the office today for new problem.  Patient sent over by Dr. Cody Sanz.  Patient here with a chief complaint of left shoulder pain.  Patient's pain is over the anterior and posterior aspects.  Increased pain with activities and improvement with rest.  No specific injury or trauma.  Patient has no cervical pain or radicular symptoms.  Dr. Sanz did perform an intra-articular cortisone injection with only mild relief.  Patient has also been participating in rotator cuff strengthening exercises directed by Dr. Sanz.    Past Medical History  Past Medical History:   Diagnosis Date    Arthritis     L hip, R hip ,jey shoulders    Chest pain 01/2011    [facility]--neg labs and stress echo    Essential hypertension, benign     GERD (gastroesophageal reflux disease)     H/O: Bell's palsy 2006    occ has sx    Morbid obesity (HCC)     DANIELLE on CPAP     Primary cardiomyopathy (HCC)     Unspecified sleep apnea     Wears glasses      Past Surgical History:   Procedure Laterality Date    COLONOSCOPY  02/2017    q 10    DENTAL SURGERY      GASTRIC BAND  2008    KNEE SURGERY Bilateral 1992 and  2005     TOTAL HIP ARTHROPLASTY Left 6/27/2024    LEFT TOTAL HIP ARTHROPLASTY, ANTERIOR APPROACH performed by Abram Alvarado MD at [facility] OR    TOTAL KNEE ARTHROPLASTY Bilateral 01/2022    True     Social History     Tobacco Use

## 2024-07-16 ENCOUNTER — HOSPITAL ENCOUNTER (OUTPATIENT)
Dept: PHYSICAL THERAPY | Age: 59
Setting detail: THERAPIES SERIES
Discharge: HOME | End: 2024-07-16
Payer: COMMERCIAL

## 2024-07-16 PROCEDURE — 97530 THERAPEUTIC ACTIVITIES: CPT | Performed by: PHYSICAL THERAPIST

## 2024-07-16 PROCEDURE — 97161 PT EVAL LOW COMPLEX 20 MIN: CPT | Performed by: PHYSICAL THERAPIST

## 2024-07-16 PROCEDURE — 97110 THERAPEUTIC EXERCISES: CPT | Performed by: PHYSICAL THERAPIST

## 2024-07-16 NOTE — PLAN OF CARE
Gait (74674)    Dry Needle 1-2 muscle (17772)     Aquatic Therex (02597)    Dry Needle 3+ muscle (66515)     Iontophoresis (93229)    VASO (47437)     Ultrasound (74274)    Group Therapy (59354)     Estim Attended (91075)    Canalith Repositioning (32843)     Other:    Other:    Total Timed Code Tx Minutes  2  1     Total Treatment Minutes 50        Charge Justification:  (02465) THERAPEUTIC EXERCISE - Provided verbal/tactile cueing for activities related to strengthening, flexibility, endurance, ROM performed to prevent loss of range of motion, maintain or improve muscular strength or increase flexibility, following either an injury or surgery.   (73913) HOME EXERCISE PROGRAM - Reviewed/Progressed HEP activities related to strengthening, flexibility, endurance, ROM performed to prevent loss of range of motion, maintain or improve muscular strength or increase flexibility, following either an injury or surgery.  (94735) THERAPEUTIC ACTIVITY - use of dynamic activities to improve functional performance. (Ex include squatting, ascending/descending stairs, walking, bending, lifting, catching, throwing, pushing, pulling, jumping.)  Direct, one on one contact, billed in 15-minute increments.    GOALS     Patient stated goal: reduce discomfort and improved strength  [] Progressing: [] Met: [] Not Met: [] Adjusted    Therapist goals for Patient:   Short Term Goals: To be achieved in: 2 weeks  1. Independent in HEP and progression per patient tolerance, in order to prevent re-injury.   [] Progressing: [] Met: [] Not Met: [] Adjusted  2. Patient will have a decrease in pain to <2/10 to facilitate improvement in movement, function, and ADLs as indicated by Functional Deficits.  [] Progressing: [] Met: [] Not Met: [] Adjusted    Long Term Goals: To be achieved in: 6 weeks  1. Disability index score of 20% or less for the WOMAC to assist with reaching prior level of function with activities such as walking.  [] Progressing:

## 2024-07-23 ENCOUNTER — HOSPITAL ENCOUNTER (OUTPATIENT)
Dept: PHYSICAL THERAPY | Age: 59
Setting detail: THERAPIES SERIES
Discharge: HOME | End: 2024-07-23
Payer: COMMERCIAL

## 2024-07-23 PROCEDURE — 97110 THERAPEUTIC EXERCISES: CPT | Performed by: PHYSICAL THERAPIST

## 2024-07-23 PROCEDURE — 97140 MANUAL THERAPY 1/> REGIONS: CPT | Performed by: PHYSICAL THERAPIST

## 2024-07-23 NOTE — FLOWSHEET NOTE
improving soft tissue extensibility and allowing for proper ROM for normal function with self care, mobility, lifting and ambulation    GOALS     Patient stated goal: reduce discomfort and improved strength  [x] Progressing: [] Met: [] Not Met: [] Adjusted    Therapist goals for Patient:   Short Term Goals: To be achieved in: 2 weeks  1. Independent in HEP and progression per patient tolerance, in order to prevent re-injury.   [x] Progressing: [] Met: [] Not Met: [] Adjusted  2. Patient will have a decrease in pain to <2/10 to facilitate improvement in movement, function, and ADLs as indicated by Functional Deficits.  [x] Progressing: [] Met: [] Not Met: [] Adjusted    Long Term Goals: To be achieved in: 6 weeks  1. Disability index score of 20% or less for the WOMAC to assist with reaching prior level of function with activities such as walking.  [x] Progressing: [] Met: [] Not Met: [] Adjusted  2. Patient will demonstrate increased AROM of L LE to WNLs without pain to allow for proper joint functioning to enable patient to ease with LE dressing and squatting.   [x] Progressing: [] Met: [] Not Met: [] Adjusted  3. Patient will demonstrate increased Strength of L LE and hip to at least 4+/5 throughout without pain to allow for proper functional mobility to enable patient to return to walk without AD and reciprocal stair ambulation.   [x] Progressing: [] Met: [] Not Met: [] Adjusted  4. Patient will return to sleep and RTW without increased symptoms or restriction.   [x] Progressing: [] Met: [] Not Met: [] Adjusted  5. Patient will report 75% improvement in pain and function    [x] Progressing: [] Met: [] Not Met: [] Adjusted     Overall Progression Towards Functional goals/ Treatment Progress Update:  [] Patient is progressing as expected towards functional goals listed.    [] Progression is slowed due to complexities/Impairments listed.  [] Progression has been slowed due to co-morbidities.  [x] Plan just

## 2024-07-24 ENCOUNTER — TELEPHONE (OUTPATIENT)
Dept: ORTHOPEDIC SURGERY | Age: 59
End: 2024-07-24

## 2024-07-24 NOTE — TELEPHONE ENCOUNTER
I’m a patient of Dr. Alvarado - I’m requesting a release request to work from home starting July 31.  The following information is required to support this release; the return to work note needs to state:    “Mekhi Robles may return to work 7/31/24 work from home only until 8/9/24.  He may return to work full duty on 8/12/24.”    Please return to Darcie Shay at fax number [phone], and email [email or web address].     Please make sure this is signed by Dr. Alvarado.       Thank you - Mekhi Robles  [phone]  ?

## 2024-07-25 ENCOUNTER — HOSPITAL ENCOUNTER (OUTPATIENT)
Dept: PHYSICAL THERAPY | Age: 59
Setting detail: THERAPIES SERIES
Discharge: HOME | End: 2024-07-25
Payer: COMMERCIAL

## 2024-07-25 PROCEDURE — 97110 THERAPEUTIC EXERCISES: CPT

## 2024-07-25 PROCEDURE — 97140 MANUAL THERAPY 1/> REGIONS: CPT

## 2024-07-25 NOTE — FLOWSHEET NOTE
[facility] - Outpatient Rehabilitation and Therapy [address] [place] [address] office: [phone] fax: [phone]     Physical Therapy: TREATMENT/PROGRESS NOTE   Patient: Mekhi Robles (59 y.o. male)   Examination Date: 2024   :  1965 MRN: [id number]   Visit #: 3   Insurance Allowable Auth Needed   MN []Yes    []No    Insurance: Payor: [employer] / Plan: [employer] - CHOICE PLU / Product Type: *No Product type* /   Insurance ID: [id number] - (Commercial)  Secondary Insurance (if applicable):    Treatment Diagnosis:   L THR with pain and weakness, gait disturbance   Medical Diagnosis:  S/P total left hip arthroplasty [Z96.642]   Referring Physician: Abram Alvarado MD PCP: Suleman Navarro MD     Plan of care signed (Y/N): Y    Date of Patient follow up with Physician:      Progress Report/POC: NO  POC update due: (10 visits /OR AUTH LIMITS, whichever is less)  2024                                             Precautions/ Contra-indications:           Latex allergy:  NO  Pacemaker:    NO  Contraindications for Manipulation: recent surgical history (relative)  Date of Surgery:   Other:    Red Flags:  None    C-SSRS Triggered by Intake questionnaire:   Patient answered 'NO' to both behavioral questions on intake.  No further screening warranted    Preferred Language for Healthcare:   [x] English       [] other:    History: Patient had L THR on 24 by Dr. Alvarado. He spent 2 days in hospital. Pt had home PT for 2 weeks. He presents to the clinic with SBQC and slow but steady gait. Patient states he is making progress this week.    SUBJECTIVE EXAMINATION     Patient stated complaint: 4 wks post op: Pt reports mostly stiffness/soreness and L leg just feels weak. But did feel good after last session. Drove himself here today, used a hook to lift his leg into the car. Doesn't use cane much at home. RTW (from home) next week.

## 2024-07-29 ENCOUNTER — HOSPITAL ENCOUNTER (OUTPATIENT)
Dept: PHYSICAL THERAPY | Age: 59
Setting detail: THERAPIES SERIES
Discharge: HOME | End: 2024-07-29
Payer: COMMERCIAL

## 2024-07-29 PROCEDURE — 97110 THERAPEUTIC EXERCISES: CPT

## 2024-07-29 PROCEDURE — 97112 NEUROMUSCULAR REEDUCATION: CPT

## 2024-07-30 NOTE — FLOWSHEET NOTE
[facility] - Outpatient Rehabilitation and Therapy [address] [place] [address] office: [phone] fax: [phone]     Physical Therapy: TREATMENT/PROGRESS NOTE   Patient: Mekhi Robles (59 y.o. male)   Examination Date: 2024   :  1965 MRN: [id number]   Visit #: Visit count could not be calculated. Make sure you are using a visit which is associated with an episode.   Insurance Allowable Auth Needed   MN []Yes    []No    Insurance: Payor: [employer] / Plan: [employer] - CHOICE PLU / Product Type: *No Product type* /   Insurance ID: [id number] - (Commercial)  Secondary Insurance (if applicable):    Treatment Diagnosis:   L THR with pain and weakness, gait disturbance   Medical Diagnosis:  S/P total left hip arthroplasty [Z96.642]   Referring Physician: Abram Alvarado MD PCP: Suleman Navarro MD     Plan of care signed (Y/N): Y    Date of Patient follow up with Physician:      Progress Report/POC: NO  POC update due: (10 visits /OR AUTH LIMITS, whichever is less)  2024                                             Precautions/ Contra-indications:           Latex allergy:  NO  Pacemaker:    NO  Contraindications for Manipulation: recent surgical history (relative)  Date of Surgery:   Other:    Red Flags:  None    C-SSRS Triggered by Intake questionnaire:   Patient answered 'NO' to both behavioral questions on intake.  No further screening warranted    Preferred Language for Healthcare:   [x] English       [] other:    History: Patient had L THR on 24 by Dr. Alvarado. He spent 2 days in hospital. Pt had home PT for 2 weeks. He presents to the clinic with SBQC and slow but steady gait. Patient states he is making progress this week.    SUBJECTIVE EXAMINATION     Patient stated complaint: Patient states he has noticed improvements over the weekend in terms of his ability to lift his left leg - he can now lift leg over the lip of

## 2024-08-05 ENCOUNTER — TELEPHONE (OUTPATIENT)
Dept: ORTHOPEDIC SURGERY | Age: 59
End: 2024-08-05

## 2024-08-05 NOTE — TELEPHONE ENCOUNTER
Surgery and/or Procedure Scheduling     Contact Name: Mekhi Robles   Surgical/Procedure Request: LEFT SHOULDER   Patient Contact Number: [phone]     PATIENT CALLING REQUESTING A CALL BACK FROM SOMEONE IN DR AMAYA'S OFFICE TO SCHEDULE SURGERY FOR HIS LEFT SHOULDER     PLEASE CALL THE PATIENT BACK AT THE ABOVE NUMBER

## 2024-08-06 ENCOUNTER — HOSPITAL ENCOUNTER (OUTPATIENT)
Dept: PHYSICAL THERAPY | Age: 59
Setting detail: THERAPIES SERIES
Discharge: HOME | End: 2024-08-06
Payer: COMMERCIAL

## 2024-08-06 PROCEDURE — 97530 THERAPEUTIC ACTIVITIES: CPT | Performed by: PHYSICAL THERAPIST

## 2024-08-06 PROCEDURE — 97110 THERAPEUTIC EXERCISES: CPT | Performed by: PHYSICAL THERAPIST

## 2024-08-06 NOTE — FLOWSHEET NOTE
[facility] - Outpatient Rehabilitation and Therapy [address], [place] [address] office: [phone] fax: [phone]     Physical Therapy: TREATMENT/PROGRESS NOTE   Patient: Mekhi Robles (59 y.o. male)   Examination Date: 2024   :  1965 MRN: [id number]   Visit #: 5   Insurance Allowable Auth Needed   MN []Yes    []No    Insurance: Payor: [employer] / Plan: [employer] - CHOICE PLU / Product Type: *No Product type* /   Insurance ID: [id number] - (Commercial)  Secondary Insurance (if applicable):    Treatment Diagnosis:   L THR with pain and weakness, gait disturbance   Medical Diagnosis:  S/P total left hip arthroplasty [Z96.642]   Referring Physician: Abram Alvarado MD PCP: Suleman Navarro MD     Plan of care signed (Y/N): Y    Date of Patient follow up with Physician:      Progress Report/POC: NO  POC update due: (10 visits /OR AUTH LIMITS, whichever is less)  2024                                             Precautions/ Contra-indications:           Latex allergy:  NO  Pacemaker:    NO  Contraindications for Manipulation: recent surgical history (relative)  Date of Surgery:   Other:    Red Flags:  None    C-SSRS Triggered by Intake questionnaire:   Patient answered 'NO' to both behavioral questions on intake.  No further screening warranted    Preferred Language for Healthcare:   [x] English       [] other:    History: Patient had L THR on 24 by Dr. Alvarado. He spent 2 days in hospital. Pt had home PT for 2 weeks. He presents to the clinic with SBQC and slow but steady gait. Patient states he is making progress this week.    SUBJECTIVE EXAMINATION     Patient stated complaint: Patient states he has noticed improvements over the weekend in his ability to lift his left leg. He can tell that he is getting stronger.     Test used Initial score  2024   Pain Summary VAS 2/10 2/10 knees   Functional questionnaire

## 2024-08-07 ENCOUNTER — OFFICE VISIT (OUTPATIENT)
Dept: ORTHOPEDIC SURGERY | Age: 59
End: 2024-08-07

## 2024-08-07 VITALS — WEIGHT: 315 LBS | HEIGHT: 74 IN | BODY MASS INDEX: 40.43 KG/M2

## 2024-08-07 DIAGNOSIS — Z09 POSTOP CHECK: Primary | ICD-10-CM

## 2024-08-07 DIAGNOSIS — Z96.642 S/P TOTAL LEFT HIP ARTHROPLASTY: ICD-10-CM

## 2024-08-07 PROCEDURE — 99024 POSTOP FOLLOW-UP VISIT: CPT | Performed by: PHYSICIAN ASSISTANT

## 2024-08-08 ENCOUNTER — HOSPITAL ENCOUNTER (OUTPATIENT)
Dept: PHYSICAL THERAPY | Age: 59
Setting detail: THERAPIES SERIES
Discharge: HOME | End: 2024-08-08
Payer: COMMERCIAL

## 2024-08-08 PROCEDURE — 97530 THERAPEUTIC ACTIVITIES: CPT | Performed by: PHYSICAL THERAPIST

## 2024-08-08 PROCEDURE — 97110 THERAPEUTIC EXERCISES: CPT | Performed by: PHYSICAL THERAPIST

## 2024-08-08 NOTE — FLOWSHEET NOTE
[facility] - Outpatient Rehabilitation and Therapy [address] [place] [address] office: [phone] fax: [phone]     Physical Therapy: TREATMENT/PROGRESS NOTE   Patient: Mekhi Robles (59 y.o. male)   Examination Date: 2024   :  1965 MRN: [id number]   Visit #: 7   Insurance Allowable Auth Needed   MN []Yes    []No    Insurance: Payor: [employer] / Plan: [employer] - CHOICE PLU / Product Type: *No Product type* /   Insurance ID: [id number] - (Commercial)  Secondary Insurance (if applicable):    Treatment Diagnosis:   L THR with pain and weakness, gait disturbance   Medical Diagnosis:  S/P total left hip arthroplasty [Z96.642]   Referring Physician: Abram Alvarado MD PCP: Suleman Navarro MD     Plan of care signed (Y/N): Y    Date of Patient follow up with Physician:      Progress Report/POC: NO  POC update due: (10 visits /OR AUTH LIMITS, whichever is less)  2024                                             Precautions/ Contra-indications:           Latex allergy:  NO  Pacemaker:    NO  Contraindications for Manipulation: recent surgical history (relative)  Date of Surgery:   Other:    Red Flags:  None    C-SSRS Triggered by Intake questionnaire:   Patient answered 'NO' to both behavioral questions on intake.  No further screening warranted    Preferred Language for Healthcare:   [x] English       [] other:    History: Patient had L THR on 24 by Dr. Alvarado. He spent 2 days in hospital. Pt had home PT for 2 weeks. He presents to the clinic with SBQC and slow but steady gait. Patient states he is making progress this week.    SUBJECTIVE EXAMINATION     Patient stated complaint: Patient states he can tell that he is getting stronger. Reciprocal stair ambulation remains difficult.      Test used Initial score  2024   Pain Summary VAS 2/10 2/10 knees   Functional questionnaire WOMAC 33/ 34%    Other:

## 2024-08-10 NOTE — PROGRESS NOTES
Encounter Diagnoses   Name Primary?    Postop check Yes    S/P total left hip arthroplasty            Medical decision making:  Patient's workup and evaluation were reviewed with the patient in the office today.  Imaging was reviewed with the patient.  Patient was educated on total joint precautions.  He was instructed on continued physical therapy and a home exercise program.  He should follow-up as needed.     All the patient's questions were answered while in the clinic.  The patient is understanding of all instructions and agrees with the plan.      Treatment Plan:    Total joint precautions  Activity modification/Rest   Ice 20 minutes ever 1-2 hours PRN  Take medications as prescribed/instructed  Elevation   Lightweight exercise/low impact exercise  Appropriate diet/weight management      Follow up: As needed      Dr. Abram Alvarado MD also reviewed this patient's history, assisted in obtaining history from the patient, conducting a physical exam, reviewed imaging, provided patient education and further coordinating care.          Venu Olvera PA-C    Physician Assistant - Certified  Orthopedic Surgery   [facility]    08/10/24 2:48 PM      This dictation was performed with a verbal recognition program (DRAGON) and it was checked for errors.  It is possible that there are still dictated errors within this office note.  If so, please bring any errors to my attention for an addendum.  All efforts were made to ensure that this office note is accurate.

## 2024-08-13 ENCOUNTER — HOSPITAL ENCOUNTER (OUTPATIENT)
Dept: PHYSICAL THERAPY | Age: 59
Setting detail: THERAPIES SERIES
End: 2024-08-13
Payer: COMMERCIAL

## 2024-08-14 ENCOUNTER — HOSPITAL ENCOUNTER (OUTPATIENT)
Dept: PHYSICAL THERAPY | Age: 59
Setting detail: THERAPIES SERIES
Discharge: HOME | End: 2024-08-14
Payer: COMMERCIAL

## 2024-08-14 PROCEDURE — 97530 THERAPEUTIC ACTIVITIES: CPT

## 2024-08-14 PROCEDURE — 97110 THERAPEUTIC EXERCISES: CPT

## 2024-08-14 NOTE — FLOWSHEET NOTE
(15691)    Group Therapy (96667)     Estim Attended (41899)    Canalith Repositioning (07124)     Other:    Other:    Total Timed Code Tx Minutes 39 3       Total Treatment Minutes 39        Charge Justification:  (74582) THERAPEUTIC EXERCISE - Provided verbal/tactile cueing for activities related to strengthening, flexibility, endurance, ROM performed to prevent loss of range of motion, maintain or improve muscular strength or increase flexibility, following either an injury or surgery.   (11239) HOME EXERCISE PROGRAM - Reviewed/Progressed HEP activities related to strengthening, flexibility, endurance, ROM performed to prevent loss of range of motion, maintain or improve muscular strength or increase flexibility, following either an injury or surgery.    GOALS     Patient stated goal: reduce discomfort and improved strength  [x] Progressing: [] Met: [] Not Met: [] Adjusted    Therapist goals for Patient:   Short Term Goals: To be achieved in: 2 weeks  1. Independent in HEP and progression per patient tolerance, in order to prevent re-injury.   [] Progressing: [x] Met: [] Not Met: [] Adjusted  2. Patient will have a decrease in pain to <2/10 to facilitate improvement in movement, function, and ADLs as indicated by Functional Deficits.  [] Progressing: [x] Met: [] Not Met: [] Adjusted    Long Term Goals: To be achieved in: 6 weeks  1. Disability index score of 20% or less for the WOMAC to assist with reaching prior level of function with activities such as walking.  [x] Progressing: [] Met: [] Not Met: [] Adjusted  2. Patient will demonstrate increased AROM of L LE to WNLs without pain to allow for proper joint functioning to enable patient to ease with LE dressing and squatting.   [x] Progressing: [] Met: [] Not Met: [] Adjusted  3. Patient will demonstrate increased Strength of L LE and hip to at least 4+/5 throughout without pain to allow for proper functional mobility to enable patient to return to walk without

## 2024-08-16 ENCOUNTER — APPOINTMENT (OUTPATIENT)
Dept: PHYSICAL THERAPY | Age: 59
End: 2024-08-16
Payer: COMMERCIAL

## 2024-08-20 ENCOUNTER — TELEPHONE (OUTPATIENT)
Dept: ORTHOPEDIC SURGERY | Age: 59
End: 2024-08-20

## 2024-08-20 ENCOUNTER — HOSPITAL ENCOUNTER (OUTPATIENT)
Dept: PHYSICAL THERAPY | Age: 59
Setting detail: THERAPIES SERIES
Discharge: HOME | End: 2024-08-20
Payer: COMMERCIAL

## 2024-08-20 PROCEDURE — 97110 THERAPEUTIC EXERCISES: CPT | Performed by: PHYSICAL THERAPIST

## 2024-08-20 PROCEDURE — 97530 THERAPEUTIC ACTIVITIES: CPT | Performed by: PHYSICAL THERAPIST

## 2024-08-20 NOTE — FLOWSHEET NOTE
orange TB hip abd    Access Code: [email or web address]  URL: [email or web address]  Date: 08/08/2024  Prepared by: Coleen Mendoza  Exercises  - Single Leg Knee Extension with Weight Machine  - 1 x daily - 7 x weekly - 2 sets - 10 reps  - Hamstring Curl with Weight Machine  - 1 x daily - 7 x weekly - 2 sets - 10 reps  - Full Leg Press  - 1 x daily - 7 x weekly - 2 sets - 10 reps  - Hip Abduction Machine  - 1 x daily - 7 x weekly - 2 sets - 10 reps  - Hip Adduction Machine  - 1 x daily - 7 x weekly - 2 sets - 10 reps    ASSESSMENT   Today's Assessment:  Pt tolerated today's treatment session well. He has made steady gains, and will continue to gain strength with cont HEP and retuning to the gym. Patient would prefer DC at this time. 3/5 LTG met.        Medical Necessity Documentation:  I certify that this patient meets the below criteria necessary for medical necessity for care and/or justification of therapy services:  The patient has functional impairments and/or activity limitations and would benefit from continued outpatient therapy services to address the deficits outlined in the patients goals    Return to Play: NA    Prognosis for POC: [x] Good [] Fair  [] Poor    Patient requires continued skilled intervention: [x] Yes  [] No      CHARGE CAPTURE     PT CHARGE GRID   CPT Code (TIMED) minutes # CPT Code (UNTIMED) #     Therex (30079)  25 2  EVAL:LOW (84947 - Typically 20 minutes face-to-face)     Neuromusc. Re-ed (45557)    Re-Eval (24274)     Manual (04199)    Estim Unattended (06893)     Ther. Act (73359) 18 1  Mech. Traction (18547)     Gait (17866)    Dry Needle 1-2 muscle (89133)     Aquatic Therex (21125)    Dry Needle 3+ muscle (20561)     Iontophoresis (65453)    VASO (54760)     Ultrasound (65417)    Group Therapy (59442)     Estim Attended (07619)    Canalith Repositioning (06856)     Other:    Other:    Total Timed Code Tx Minutes 43 3       Total Treatment Minutes 43        Charge

## 2024-08-23 NOTE — TELEPHONE ENCOUNTER
Per Note of  8/5 with same title     Patient needs an appointment   
Surgery and/or Procedure Scheduling     Contact Name: Mehki Robles   Surgical/Procedure Request: LEFT SHOULDER   Patient Contact Number: [phone]    
none

## 2024-08-30 ENCOUNTER — OFFICE VISIT (OUTPATIENT)
Dept: ORTHOPEDIC SURGERY | Age: 59
End: 2024-08-30
Payer: COMMERCIAL

## 2024-08-30 ENCOUNTER — TELEPHONE (OUTPATIENT)
Dept: ORTHOPEDIC SURGERY | Age: 59
End: 2024-08-30

## 2024-08-30 VITALS — WEIGHT: 315 LBS | BODY MASS INDEX: 40.43 KG/M2 | HEIGHT: 74 IN

## 2024-08-30 DIAGNOSIS — M19.012 PRIMARY OSTEOARTHRITIS OF LEFT SHOULDER: Primary | ICD-10-CM

## 2024-08-30 PROCEDURE — G8428 CUR MEDS NOT DOCUMENT: HCPCS | Performed by: ORTHOPAEDIC SURGERY

## 2024-08-30 PROCEDURE — 99213 OFFICE O/P EST LOW 20 MIN: CPT | Performed by: ORTHOPAEDIC SURGERY

## 2024-08-30 PROCEDURE — 1036F TOBACCO NON-USER: CPT | Performed by: ORTHOPAEDIC SURGERY

## 2024-08-30 PROCEDURE — G8417 CALC BMI ABV UP PARAM F/U: HCPCS | Performed by: ORTHOPAEDIC SURGERY

## 2024-08-30 PROCEDURE — 3017F COLORECTAL CA SCREEN DOC REV: CPT | Performed by: ORTHOPAEDIC SURGERY

## 2024-08-30 NOTE — TELEPHONE ENCOUNTER
No Pre-Cert Required for MRI- Lf Shoulder   Patients Ins is [facility]   Patients insurance policy does not require a Pre Auth   Letter in Media

## 2024-08-30 NOTE — PROGRESS NOTES
Dr Bear Amin      Date /Time 8/30/2024             9:14 AM EDT  Name Mekhi Robles             1965   [facility]  [facility]  MRN [id number]                Chief Complaint   Patient presents with    Follow-up     Ck Left Shoulder (Cortisone 07/12/2024) (MRI vs SX)          History of Present Illness    Mekhi Robles is a 59 y.o. male who presents with  left Shoulder pain.    Sent in consultation by Suleman Navarro MD, .      Athletic/exercise activity: no sports.  Injury Mechanism:  none.  Worker's Comp. & legal issues:   none.  Previous Treatments: Ice, Heat, and NSAIDs    Patient presents to the office today for a follow-up visit.  Patient being treated for advanced left shoulder osteoarthritis.  Patient did receive an intra-articular cortisone injection on July 12, 2024.  The injection did work well for period of time but pain has returned without any new injury or trauma.    Previous history: Patient presents to the office today for new problem.  Patient sent over by Dr. Cody Sanz.  Patient here with a chief complaint of left shoulder pain.  Patient's pain is over the anterior and posterior aspects.  Increased pain with activities and improvement with rest.  No specific injury or trauma.  Patient has no cervical pain or radicular symptoms.  Dr. Sanz did perform an intra-articular cortisone injection with only mild relief.  Patient has also been participating in rotator cuff strengthening exercises directed by Dr. Sanz.    Past Medical History  Past Medical History:   Diagnosis Date    Arthritis     L hip, R hip ,jey shoulders    Chest pain 01/2011    [facility]--neg labs and stress echo    Essential hypertension, benign     GERD (gastroesophageal reflux disease)     H/O: Bell's palsy 2006    occ has sx    Morbid obesity (HCC)     DANIELLE on CPAP     Primary cardiomyopathy (HCC)     Unspecified sleep apnea     Wears glasses      Past Surgical History:   Procedure Laterality Date     4  4    Internal Rotation 4  4    Provocative Signs/Tests  [] All Neg   [x] +      [] -  [] All Neg   [x] +      [] -   Rotator Cuff Signs  [] All Neg  [] Not tested   [] All Neg  [] Not tested    Neer  [x]  []Not tested   [x]  []Not tested    Pagan  [x]  []Not tested   [x]  []Not tested    Painful arc  [x]  []Not tested   [x]  []Not tested    Greater tuberosity tenderness  []  []Not tested   []  []Not tested    Drop arm  []  []Not tested  []  []Not tested   Superior Escape  []  []Not tested   []  []Not tested    ER Lag  []  []Not tested   []  []Not tested    Belly press  []  []Not tested   []  []Not tested    Lift-off  []  []Not tested   []  []Not tested    Bear hug  []  []Not tested   []  []Not tested    Biceps/Labral Signs  [] All Neg  [] Not tested   [] All Neg  [] Not tested    Bender's  [x]  []Not tested   [x]  []Not tested    Speed's  [x]  []Not tested   [x]  []Not tested    Dynamic Load Shift/Shear  []  []Not tested   []  []Not tested    Clicking/Popping  []  []Not tested  []  []Not tested   Bicipital groove tenderness  []  []Not tested   []  []Not tested    Armaan  []  []Not tested   []  []Not tested    AC Joint Signs  [x] All Neg  [] Not tested   [x] All Neg  [] Not tested    AC joint tenderness  []  []Not tested   []  []Not tested    Cross-arm adduction pain  []  []Not tested   []  []Not tested    Instability Signs  [] All Neg  [] Not tested  [] All Neg  [] Not tested   General laxity (thumb/elbow)  []  []Not tested   []  []Not tested    Hyperabduction  []  []Not tested   []  []Not tested    Sulcus []Side   []ER    []Side   []ER       Anterior apprehension  []  []Not tested   []  []Not tested    Relocation  []   []Not tested  []  []Not tested     Imaging  Left Shoulder: [facility]  Previous Radiographs: X-rays were reviewed from May 17, 2024.  They demonstrate advanced left shoulder glenohumeral joint osteoarthritis.  There is bone-on-bone contact with large osteophytes and subchondral

## 2024-09-23 DIAGNOSIS — R60.1 ANASARCA: Primary | ICD-10-CM

## 2024-09-23 DIAGNOSIS — R35.0 URINARY FREQUENCY: ICD-10-CM

## 2024-09-24 DIAGNOSIS — R35.0 URINARY FREQUENCY: ICD-10-CM

## 2024-09-24 DIAGNOSIS — R60.1 ANASARCA: ICD-10-CM

## 2024-09-25 DIAGNOSIS — R73.9 HYPERGLYCEMIA: Primary | ICD-10-CM

## 2024-09-25 DIAGNOSIS — R73.9 HYPERGLYCEMIA: ICD-10-CM

## 2024-09-25 LAB
ALBUMIN SERPL-MCNC: 4 G/DL (ref 3.4–5)
ALBUMIN/GLOB SERPL: 1.5 {RATIO} (ref 1.1–2.2)
ALP SERPL-CCNC: 112 U/L (ref 40–129)
ALT SERPL-CCNC: 43 U/L (ref 10–40)
ANION GAP SERPL CALCULATED.3IONS-SCNC: 11 MMOL/L (ref 3–16)
AST SERPL-CCNC: 19 U/L (ref 15–37)
BASOPHILS # BLD: 0.1 K/UL (ref 0–0.2)
BASOPHILS NFR BLD: 0.7 %
BILIRUB SERPL-MCNC: 0.8 MG/DL (ref 0–1)
BUN SERPL-MCNC: 12 MG/DL (ref 7–20)
CALCIUM SERPL-MCNC: 9.5 MG/DL (ref 8.3–10.6)
CHLORIDE SERPL-SCNC: 105 MMOL/L (ref 99–110)
CO2 SERPL-SCNC: 27 MMOL/L (ref 21–32)
CREAT SERPL-MCNC: 0.8 MG/DL (ref 0.8–1.3)
DEPRECATED RDW RBC AUTO: 15.1 % (ref 12.4–15.4)
EOSINOPHIL # BLD: 0.1 K/UL (ref 0–0.6)
EOSINOPHIL NFR BLD: 1.1 %
EST. AVERAGE GLUCOSE BLD GHB EST-MCNC: 131.2 MG/DL
GFR SERPLBLD CREATININE-BSD FMLA CKD-EPI: >90 ML/MIN/{1.73_M2}
GLUCOSE SERPL-MCNC: 143 MG/DL (ref 70–99)
HBA1C MFR BLD: 6.2 %
HCT VFR BLD AUTO: 40.5 % (ref 40.5–52.5)
HGB BLD-MCNC: 13.5 G/DL (ref 13.5–17.5)
LYMPHOCYTES # BLD: 1.2 K/UL (ref 1–5.1)
LYMPHOCYTES NFR BLD: 14.8 %
MCH RBC QN AUTO: 33.5 PG (ref 26–34)
MCHC RBC AUTO-ENTMCNC: 33.3 G/DL (ref 31–36)
MCV RBC AUTO: 100.5 FL (ref 80–100)
MONOCYTES # BLD: 0.6 K/UL (ref 0–1.3)
MONOCYTES NFR BLD: 6.7 %
NEUTROPHILS # BLD: 6.4 K/UL (ref 1.7–7.7)
NEUTROPHILS NFR BLD: 76.7 %
NT-PROBNP SERPL-MCNC: 50 PG/ML (ref 0–124)
PLATELET # BLD AUTO: 190 K/UL (ref 135–450)
PMV BLD AUTO: 8.4 FL (ref 5–10.5)
POTASSIUM SERPL-SCNC: 4 MMOL/L (ref 3.5–5.1)
PROT SERPL-MCNC: 6.6 G/DL (ref 6.4–8.2)
RBC # BLD AUTO: 4.03 M/UL (ref 4.2–5.9)
SODIUM SERPL-SCNC: 143 MMOL/L (ref 136–145)
WBC # BLD AUTO: 8.3 K/UL (ref 4–11)

## 2024-09-30 ENCOUNTER — OFFICE VISIT (OUTPATIENT)
Dept: FAMILY MEDICINE CLINIC | Age: 59
End: 2024-09-30
Payer: COMMERCIAL

## 2024-09-30 VITALS
DIASTOLIC BLOOD PRESSURE: 80 MMHG | OXYGEN SATURATION: 95 % | BODY MASS INDEX: 40.43 KG/M2 | HEIGHT: 74 IN | HEART RATE: 96 BPM | SYSTOLIC BLOOD PRESSURE: 120 MMHG | WEIGHT: 315 LBS

## 2024-09-30 DIAGNOSIS — U07.1 COVID: ICD-10-CM

## 2024-09-30 DIAGNOSIS — R60.0 BILATERAL LEG EDEMA: ICD-10-CM

## 2024-09-30 DIAGNOSIS — M12.9 ARTHROPATHY: ICD-10-CM

## 2024-09-30 DIAGNOSIS — M79.89 LEG SWELLING: ICD-10-CM

## 2024-09-30 DIAGNOSIS — I42.9 PRIMARY CARDIOMYOPATHY (HCC): ICD-10-CM

## 2024-09-30 DIAGNOSIS — M79.89 LEG SWELLING: Primary | ICD-10-CM

## 2024-09-30 LAB — D-DIMER QUANTITATIVE: 1.57 UG/ML FEU (ref 0–0.6)

## 2024-09-30 PROCEDURE — 1036F TOBACCO NON-USER: CPT | Performed by: FAMILY MEDICINE

## 2024-09-30 PROCEDURE — 3079F DIAST BP 80-89 MM HG: CPT | Performed by: FAMILY MEDICINE

## 2024-09-30 PROCEDURE — G8427 DOCREV CUR MEDS BY ELIG CLIN: HCPCS | Performed by: FAMILY MEDICINE

## 2024-09-30 PROCEDURE — G8417 CALC BMI ABV UP PARAM F/U: HCPCS | Performed by: FAMILY MEDICINE

## 2024-09-30 PROCEDURE — 3017F COLORECTAL CA SCREEN DOC REV: CPT | Performed by: FAMILY MEDICINE

## 2024-09-30 PROCEDURE — 3074F SYST BP LT 130 MM HG: CPT | Performed by: FAMILY MEDICINE

## 2024-09-30 PROCEDURE — 99214 OFFICE O/P EST MOD 30 MIN: CPT | Performed by: FAMILY MEDICINE

## 2024-09-30 RX ORDER — FUROSEMIDE 40 MG
40 TABLET ORAL DAILY
Qty: 30 TABLET | Refills: 3 | Status: SHIPPED | OUTPATIENT
Start: 2024-09-30

## 2024-09-30 ASSESSMENT — PATIENT HEALTH QUESTIONNAIRE - PHQ9
SUM OF ALL RESPONSES TO PHQ9 QUESTIONS 1 & 2: 0
SUM OF ALL RESPONSES TO PHQ QUESTIONS 1-9: 0
2. FEELING DOWN, DEPRESSED OR HOPELESS: NOT AT ALL
1. LITTLE INTEREST OR PLEASURE IN DOING THINGS: NOT AT ALL
SUM OF ALL RESPONSES TO PHQ QUESTIONS 1-9: 0

## 2024-09-30 ASSESSMENT — ENCOUNTER SYMPTOMS
COUGH: 0
SORE THROAT: 0
ABDOMINAL PAIN: 0
CHANGE IN BOWEL HABIT: 0
SWOLLEN GLANDS: 0
VISUAL CHANGE: 0
NAUSEA: 0
VOMITING: 0

## 2024-09-30 NOTE — ASSESSMENT & PLAN NOTE
New, not at goal (unstable), changes made today: labs--? Gout vs swelling due to steroids and celebrex-stop celebrex-start lasix once daily

## 2024-09-30 NOTE — ASSESSMENT & PLAN NOTE
New, not at goal (unstable), changes made today: stop celebrex-check for gout-tx with 40 of lasix for 7 d-call me at end of week

## 2024-09-30 NOTE — PROGRESS NOTES
Skin:     General: Skin is warm and dry.      Coloration: Skin is not pale.      Findings: No erythema or rash.   Neurological:      Mental Status: He is alert and oriented to person, place, and time.      Cranial Nerves: No cranial nerve deficit.      Motor: No weakness or abnormal muscle tone.      Coordination: Coordination normal.      Gait: Gait normal.      Deep Tendon Reflexes: Reflexes normal.   Psychiatric:         Mood and Affect: Mood normal.         Behavior: Behavior normal.         Thought Content: Thought content normal.         Judgment: Judgment normal.       Orders Only on 09/25/2024   Component Date Value Ref Range Status    Hemoglobin A1C 09/24/2024 6.2  See comment % Final    Estimated Avg Glucose 09/24/2024 131.2  mg/dL Final   Orders Only on 09/24/2024   Component Date Value Ref Range Status    NT Pro-BNP 09/24/2024 50  0 - 124 pg/mL Final    WBC 09/24/2024 8.3  4.0 - 11.0 K/uL Final    RBC 09/24/2024 4.03 (L)  4.20 - 5.90 M/uL Final    Hemoglobin 09/24/2024 13.5  13.5 - 17.5 g/dL Final    Hematocrit 09/24/2024 40.5  40.5 - 52.5 % Final    MCV 09/24/2024 100.5 (H)  80.0 - 100.0 fL Final    MCH 09/24/2024 33.5  26.0 - 34.0 pg Final    MCHC 09/24/2024 33.3  31.0 - 36.0 g/dL Final    RDW 09/24/2024 15.1  12.4 - 15.4 % Final    Platelets 09/24/2024 190  135 - 450 K/uL Final    MPV 09/24/2024 8.4  5.0 - 10.5 fL Final    Neutrophils % 09/24/2024 76.7  % Final    Lymphocytes % 09/24/2024 14.8  % Final    Monocytes % 09/24/2024 6.7  % Final    Eosinophils % 09/24/2024 1.1  % Final    Basophils % 09/24/2024 0.7  % Final    Neutrophils Absolute 09/24/2024 6.4  1.7 - 7.7 K/uL Final    Lymphocytes Absolute 09/24/2024 1.2  1.0 - 5.1 K/uL Final    Monocytes Absolute 09/24/2024 0.6  0.0 - 1.3 K/uL Final    Eosinophils Absolute 09/24/2024 0.1  0.0 - 0.6 K/uL Final    Basophils Absolute 09/24/2024 0.1  0.0 - 0.2 K/uL Final    Sodium 09/24/2024 143  136 - 145 mmol/L Final    Potassium 09/24/2024 4.0  3.5 -

## 2024-10-01 LAB
CRP SERPL-MCNC: 27.2 MG/L (ref 0–5.1)
URATE SERPL-MCNC: 6.1 MG/DL (ref 3.5–7.2)

## 2024-10-15 ENCOUNTER — TELEPHONE (OUTPATIENT)
Dept: FAMILY MEDICINE CLINIC | Age: 59
End: 2024-10-15

## 2024-10-17 ENCOUNTER — OFFICE VISIT (OUTPATIENT)
Dept: FAMILY MEDICINE CLINIC | Age: 59
End: 2024-10-17
Payer: COMMERCIAL

## 2024-10-17 VITALS
OXYGEN SATURATION: 96 % | HEIGHT: 74 IN | DIASTOLIC BLOOD PRESSURE: 80 MMHG | SYSTOLIC BLOOD PRESSURE: 130 MMHG | BODY MASS INDEX: 40.43 KG/M2 | WEIGHT: 315 LBS | HEART RATE: 88 BPM

## 2024-10-17 DIAGNOSIS — Z96.642 S/P HIP REPLACEMENT, LEFT: ICD-10-CM

## 2024-10-17 DIAGNOSIS — U07.1 COVID: ICD-10-CM

## 2024-10-17 DIAGNOSIS — Z96.642 S/P TOTAL LEFT HIP ARTHROPLASTY: ICD-10-CM

## 2024-10-17 DIAGNOSIS — M79.89 LEG SWELLING: Primary | ICD-10-CM

## 2024-10-17 DIAGNOSIS — R60.0 BILATERAL LEG EDEMA: ICD-10-CM

## 2024-10-17 DIAGNOSIS — M79.89 LEG SWELLING: ICD-10-CM

## 2024-10-17 LAB
ANION GAP SERPL CALCULATED.3IONS-SCNC: 15 MMOL/L (ref 3–16)
BASOPHILS # BLD: 0.1 K/UL (ref 0–0.2)
BASOPHILS NFR BLD: 0.9 %
BUN SERPL-MCNC: 18 MG/DL (ref 7–20)
CALCIUM SERPL-MCNC: 9.8 MG/DL (ref 8.3–10.6)
CHLORIDE SERPL-SCNC: 103 MMOL/L (ref 99–110)
CO2 SERPL-SCNC: 27 MMOL/L (ref 21–32)
CREAT SERPL-MCNC: 1 MG/DL (ref 0.8–1.3)
CRP SERPL-MCNC: 20.4 MG/L (ref 0–5.1)
D-DIMER QUANTITATIVE: 1.71 UG/ML FEU (ref 0–0.6)
DEPRECATED RDW RBC AUTO: 15 % (ref 12.4–15.4)
EOSINOPHIL # BLD: 0.1 K/UL (ref 0–0.6)
EOSINOPHIL NFR BLD: 1.2 %
GFR SERPLBLD CREATININE-BSD FMLA CKD-EPI: 86 ML/MIN/{1.73_M2}
GLUCOSE SERPL-MCNC: 241 MG/DL (ref 70–99)
HCT VFR BLD AUTO: 37.7 % (ref 40.5–52.5)
HGB BLD-MCNC: 12.5 G/DL (ref 13.5–17.5)
LYMPHOCYTES # BLD: 1.3 K/UL (ref 1–5.1)
LYMPHOCYTES NFR BLD: 16.4 %
MCH RBC QN AUTO: 33.6 PG (ref 26–34)
MCHC RBC AUTO-ENTMCNC: 33.2 G/DL (ref 31–36)
MCV RBC AUTO: 101.2 FL (ref 80–100)
MONOCYTES # BLD: 0.6 K/UL (ref 0–1.3)
MONOCYTES NFR BLD: 7 %
NEUTROPHILS # BLD: 6 K/UL (ref 1.7–7.7)
NEUTROPHILS NFR BLD: 74.5 %
PLATELET # BLD AUTO: 296 K/UL (ref 135–450)
PMV BLD AUTO: 9 FL (ref 5–10.5)
POTASSIUM SERPL-SCNC: 3.7 MMOL/L (ref 3.5–5.1)
RBC # BLD AUTO: 3.72 M/UL (ref 4.2–5.9)
SODIUM SERPL-SCNC: 145 MMOL/L (ref 136–145)
WBC # BLD AUTO: 8.1 K/UL (ref 4–11)

## 2024-10-17 PROCEDURE — 3079F DIAST BP 80-89 MM HG: CPT | Performed by: FAMILY MEDICINE

## 2024-10-17 PROCEDURE — G8484 FLU IMMUNIZE NO ADMIN: HCPCS | Performed by: FAMILY MEDICINE

## 2024-10-17 PROCEDURE — 3075F SYST BP GE 130 - 139MM HG: CPT | Performed by: FAMILY MEDICINE

## 2024-10-17 PROCEDURE — G8417 CALC BMI ABV UP PARAM F/U: HCPCS | Performed by: FAMILY MEDICINE

## 2024-10-17 PROCEDURE — 3017F COLORECTAL CA SCREEN DOC REV: CPT | Performed by: FAMILY MEDICINE

## 2024-10-17 PROCEDURE — 1036F TOBACCO NON-USER: CPT | Performed by: FAMILY MEDICINE

## 2024-10-17 PROCEDURE — 99213 OFFICE O/P EST LOW 20 MIN: CPT | Performed by: FAMILY MEDICINE

## 2024-10-17 PROCEDURE — G8427 DOCREV CUR MEDS BY ELIG CLIN: HCPCS | Performed by: FAMILY MEDICINE

## 2024-10-17 ASSESSMENT — PATIENT HEALTH QUESTIONNAIRE - PHQ9
SUM OF ALL RESPONSES TO PHQ QUESTIONS 1-9: 0
SUM OF ALL RESPONSES TO PHQ9 QUESTIONS 1 & 2: 0
1. LITTLE INTEREST OR PLEASURE IN DOING THINGS: NOT AT ALL
2. FEELING DOWN, DEPRESSED OR HOPELESS: NOT AT ALL
SUM OF ALL RESPONSES TO PHQ QUESTIONS 1-9: 0

## 2024-10-17 NOTE — PROGRESS NOTES
achy    Bilateral leg edema   New, uncertain prognosis, changes made today: L>R--labs?DVT    S/P total left hip arthroplasty    ? DVT

## 2024-10-21 ENCOUNTER — HOSPITAL ENCOUNTER (OUTPATIENT)
Age: 59
Discharge: HOME | End: 2024-10-23
Payer: COMMERCIAL

## 2024-10-21 DIAGNOSIS — M79.89 LEG SWELLING: ICD-10-CM

## 2024-10-21 DIAGNOSIS — Z96.642 S/P HIP REPLACEMENT, LEFT: ICD-10-CM

## 2024-10-21 PROCEDURE — 93971 EXTREMITY STUDY: CPT

## 2024-10-21 PROCEDURE — 93971 EXTREMITY STUDY: CPT | Performed by: INTERNAL MEDICINE

## 2024-10-22 ENCOUNTER — PATIENT MESSAGE (OUTPATIENT)
Dept: FAMILY MEDICINE CLINIC | Age: 59
End: 2024-10-22

## 2024-10-22 DIAGNOSIS — M79.89 LEG SWELLING: Primary | ICD-10-CM

## 2024-10-22 DIAGNOSIS — M12.9 ARTHROPATHY: ICD-10-CM

## 2024-10-22 NOTE — TELEPHONE ENCOUNTER
No better with pain and or swelling--will do studies for CV diseases and continue current plan--NSAIDs and steroids can help but I fear they will cause water retention

## 2024-10-23 DIAGNOSIS — M12.9 ARTHROPATHY: ICD-10-CM

## 2024-10-23 DIAGNOSIS — M79.89 LEG SWELLING: ICD-10-CM

## 2024-10-24 LAB
ANA SER QL IA: NEGATIVE
ANION GAP SERPL CALCULATED.3IONS-SCNC: 13 MMOL/L (ref 3–16)
BASOPHILS # BLD: 0.1 K/UL (ref 0–0.2)
BASOPHILS NFR BLD: 1.1 %
BUN SERPL-MCNC: 15 MG/DL (ref 7–20)
CALCIUM SERPL-MCNC: 9.8 MG/DL (ref 8.3–10.6)
CCP IGG SERPL-ACNC: 0.8 U/ML (ref 0–2.9)
CHLORIDE SERPL-SCNC: 106 MMOL/L (ref 99–110)
CO2 SERPL-SCNC: 27 MMOL/L (ref 21–32)
CREAT SERPL-MCNC: 0.8 MG/DL (ref 0.8–1.3)
CRP SERPL-MCNC: 13.6 MG/L (ref 0–5.1)
DEPRECATED RDW RBC AUTO: 15.5 % (ref 12.4–15.4)
EOSINOPHIL # BLD: 0.2 K/UL (ref 0–0.6)
EOSINOPHIL NFR BLD: 1.9 %
GFR SERPLBLD CREATININE-BSD FMLA CKD-EPI: >90 ML/MIN/{1.73_M2}
GLUCOSE SERPL-MCNC: 95 MG/DL (ref 70–99)
HCT VFR BLD AUTO: 38.7 % (ref 40.5–52.5)
HGB BLD-MCNC: 13.2 G/DL (ref 13.5–17.5)
LYMPHOCYTES # BLD: 1.7 K/UL (ref 1–5.1)
LYMPHOCYTES NFR BLD: 18.3 %
MCH RBC QN AUTO: 34.4 PG (ref 26–34)
MCHC RBC AUTO-ENTMCNC: 34.1 G/DL (ref 31–36)
MCV RBC AUTO: 100.8 FL (ref 80–100)
MONOCYTES # BLD: 0.8 K/UL (ref 0–1.3)
MONOCYTES NFR BLD: 8.6 %
NEUTROPHILS # BLD: 6.4 K/UL (ref 1.7–7.7)
NEUTROPHILS NFR BLD: 70.1 %
PLATELET # BLD AUTO: 274 K/UL (ref 135–450)
PMV BLD AUTO: 9.8 FL (ref 5–10.5)
POTASSIUM SERPL-SCNC: 3.8 MMOL/L (ref 3.5–5.1)
RBC # BLD AUTO: 3.84 M/UL (ref 4.2–5.9)
RHEUMATOID FACT SER IA-ACNC: <10 IU/ML
SODIUM SERPL-SCNC: 146 MMOL/L (ref 136–145)
TSH SERPL DL<=0.005 MIU/L-ACNC: 0.58 UIU/ML (ref 0.27–4.2)
WBC # BLD AUTO: 9.2 K/UL (ref 4–11)

## 2024-11-07 ENCOUNTER — OFFICE VISIT (OUTPATIENT)
Dept: FAMILY MEDICINE CLINIC | Age: 59
End: 2024-11-07
Payer: COMMERCIAL

## 2024-11-07 VITALS
HEIGHT: 74 IN | OXYGEN SATURATION: 95 % | DIASTOLIC BLOOD PRESSURE: 82 MMHG | WEIGHT: 315 LBS | BODY MASS INDEX: 40.43 KG/M2 | HEART RATE: 72 BPM | SYSTOLIC BLOOD PRESSURE: 130 MMHG

## 2024-11-07 DIAGNOSIS — I10 ESSENTIAL HYPERTENSION, BENIGN: Primary | ICD-10-CM

## 2024-11-07 DIAGNOSIS — Z96.642 S/P HIP REPLACEMENT, LEFT: ICD-10-CM

## 2024-11-07 DIAGNOSIS — M12.9 ARTHROPATHY: ICD-10-CM

## 2024-11-07 PROCEDURE — 1036F TOBACCO NON-USER: CPT | Performed by: FAMILY MEDICINE

## 2024-11-07 PROCEDURE — 99213 OFFICE O/P EST LOW 20 MIN: CPT | Performed by: FAMILY MEDICINE

## 2024-11-07 PROCEDURE — 3017F COLORECTAL CA SCREEN DOC REV: CPT | Performed by: FAMILY MEDICINE

## 2024-11-07 PROCEDURE — G8484 FLU IMMUNIZE NO ADMIN: HCPCS | Performed by: FAMILY MEDICINE

## 2024-11-07 PROCEDURE — 3079F DIAST BP 80-89 MM HG: CPT | Performed by: FAMILY MEDICINE

## 2024-11-07 PROCEDURE — G8417 CALC BMI ABV UP PARAM F/U: HCPCS | Performed by: FAMILY MEDICINE

## 2024-11-07 PROCEDURE — 3075F SYST BP GE 130 - 139MM HG: CPT | Performed by: FAMILY MEDICINE

## 2024-11-07 PROCEDURE — G8427 DOCREV CUR MEDS BY ELIG CLIN: HCPCS | Performed by: FAMILY MEDICINE

## 2024-11-07 RX ORDER — AMLODIPINE BESYLATE 10 MG/1
5 TABLET ORAL DAILY
Qty: 90 TABLET | Refills: 3 | OUTPATIENT
Start: 2024-11-07

## 2024-11-07 RX ORDER — PREDNISONE 10 MG/1
20 TABLET ORAL DAILY
COMMUNITY
Start: 2024-10-25

## 2024-11-07 RX ORDER — NEBIVOLOL 5 MG/1
5 TABLET ORAL DAILY
Qty: 30 TABLET | Refills: 3 | Status: SHIPPED | OUTPATIENT
Start: 2024-11-07

## 2024-11-07 ASSESSMENT — ENCOUNTER SYMPTOMS
BACK PAIN: 0
EYE DISCHARGE: 0
ABDOMINAL DISTENTION: 0
SHORTNESS OF BREATH: 0
WHEEZING: 0
ORTHOPNEA: 0
EYE ITCHING: 0
EYE PAIN: 0
PHOTOPHOBIA: 0
COLOR CHANGE: 0
FACIAL SWELLING: 0
APNEA: 0
ABDOMINAL PAIN: 0
CHEST TIGHTNESS: 0
BLOOD IN STOOL: 0
ANAL BLEEDING: 0
STRIDOR: 0
RHINORRHEA: 0
CHOKING: 0
BLURRED VISION: 0
COUGH: 0
EYE REDNESS: 0
SINUS PRESSURE: 0

## 2024-11-07 NOTE — ASSESSMENT & PLAN NOTE
Chronic, not at goal (unstable), changes made today: dec norvasc by 1/2--stop coreg and start bystolic--con't lasix-see 6 weeks

## 2024-11-07 NOTE — PROGRESS NOTES
normal.         Behavior: Behavior normal.         Thought Content: Thought content normal.         Judgment: Judgment normal.         Assessment and Plan:     Essential hypertension, benign   Chronic, not at goal (unstable), changes made today: dec norvasc by 1/2--stop coreg and start bystolic--con't lasix-see 6 weeks    --PT ref for leg weakness

## 2024-11-19 ENCOUNTER — HOSPITAL ENCOUNTER (OUTPATIENT)
Dept: PHYSICAL THERAPY | Age: 59
Setting detail: THERAPIES SERIES
Discharge: HOME | End: 2024-11-19
Payer: COMMERCIAL

## 2024-11-19 PROCEDURE — 97161 PT EVAL LOW COMPLEX 20 MIN: CPT | Performed by: PHYSICAL THERAPIST

## 2024-11-19 PROCEDURE — 97110 THERAPEUTIC EXERCISES: CPT | Performed by: PHYSICAL THERAPIST

## 2024-11-19 NOTE — PLAN OF CARE
return to navigating stairs.   [] Progressing: [] Met: [] Not Met: [] Adjusted  4. Patient will return to walking, squatting with normal ADLs and sit-stands without increased symptoms or restriction.   [] Progressing: [] Met: [] Not Met: [] Adjusted  5. Patient will report 50% improvement in endurance with ADLs.   [] Progressing: [] Met: [] Not Met: [] Adjusted     Overall Progression Towards Functional goals/ Treatment Progress Update:  [] Patient is progressing as expected towards functional goals listed.    [] Progression is slowed due to complexities/Impairments listed.  [] Progression has been slowed due to co-morbidities.  [x] Plan just implemented, too soon (<30days) to assess goals progression   [] Goals require adjustment due to lack of progress  [] Patient is not progressing as expected and requires additional follow up with physician  [] Other:     TREATMENT PLAN     Frequency/Duration: 2x/week for 4-6 weeks for the following treatment interventions:    Interventions:  Therapeutic Exercise (02827) including: strength training, ROM, and functional mobility  Therapeutic Activities (31875) including: functional mobility training and education.  Neuromuscular Re-education (73462) activation and proprioception, including postural re-education.    Gait Training (10655) for normalization of ambulation patterns and AD training.   Manual Therapy (89280) as indicated to include: Passive Range of Motion, Gr I-IV mobilizations, and Soft Tissue Mobilization  Modalities as needed that may include: Cryotherapy and Electrical Stimulation  Patient education on joint protection, postural re-education, activity modification, and progression of HEP    Plan: POC initiated as per evaluation    Electronically Signed by FLORENCIO Macias OMT-c, CNS [id number]  Date: 11/19/2024     Note: Portions of this note have been templated and/or copied from initial evaluation, reassessments and prior notes for documentation

## 2024-11-26 ENCOUNTER — HOSPITAL ENCOUNTER (OUTPATIENT)
Dept: PHYSICAL THERAPY | Age: 59
Setting detail: THERAPIES SERIES
End: 2024-11-26
Payer: COMMERCIAL

## 2024-12-03 ENCOUNTER — HOSPITAL ENCOUNTER (OUTPATIENT)
Dept: PHYSICAL THERAPY | Age: 59
Setting detail: THERAPIES SERIES
Discharge: HOME | End: 2024-12-03
Payer: COMMERCIAL

## 2024-12-03 PROCEDURE — 97110 THERAPEUTIC EXERCISES: CPT | Performed by: PHYSICAL THERAPIST

## 2024-12-03 PROCEDURE — 97530 THERAPEUTIC ACTIVITIES: CPT | Performed by: PHYSICAL THERAPIST

## 2024-12-03 NOTE — FLOWSHEET NOTE
[facility] - Outpatient Rehabilitation and Therapy [address], [place] [address] office: [phone] fax: [phone]         Physical Therapy: TREATMENT/PROGRESS NOTE   Patient: Mekhi Robles (59 y.o. male)   Examination Date: 2024   :  1965 MRN: [id number]   Visit #: 2   Insurance Allowable Auth Needed   [place] []Yes    []No    Insurance: Payor: UNITED HE[employer]CARE / Plan: UNITED HEALTHCARE - CHOICE PLUS / Product Type: *No Product type* /   Insurance ID: [id number] - (Commercial)  Secondary Insurance (if applicable):    Treatment Diagnosis:   L LE Weakness & deconditioning after Covid   Medical Diagnosis:  S/P hip replacement, left [Z96.642]  Arthropathy [M12.9]   Referring Physician: Suleman Navarro MD PCP: Jesse Bennett MD     Plan of care signed (Y/N):     Date of Patient follow up with Physician:      Plan of Care Report: NO  POC update due: (10 visits /OR AUTH LIMITS, whichever is less)  2025                                             Medical History:  Comorbidities:  Osteoarthritis  Other Musculoskeletal Conditions: B TKR, L THR, L Shldr OA - needs TSR but has delayed  Relevant Medical History:                                          Precautions/ Contra-indications:           Latex allergy:  NO  Pacemaker:    NO  Contraindications for Manipulation: unhealthy/ multiple comorbidities   Date of Surgery:   Other:    Red Flags:  None    Suicide Screening:   The patient did not verbalize a primary behavioral concern, suicidal ideation, suicidal intent, or demonstrate suicidal behaviors.    Preferred Language for Healthcare:   [x] English       [] other:    SUBJECTIVE EXAMINATION     Patient stated complaint: Patient had L THR 24. He got Covid on 24 that last weeks and resulted in B LE weakness. Knees are buckling. Steps are difficult. He has been on Prednisone for 3 weeks now. Patient referred to PT by Dr. Navarro.       Test used Initial

## 2024-12-06 ENCOUNTER — HOSPITAL ENCOUNTER (OUTPATIENT)
Dept: PHYSICAL THERAPY | Age: 59
Setting detail: THERAPIES SERIES
Discharge: HOME | End: 2024-12-06
Payer: COMMERCIAL

## 2024-12-06 PROCEDURE — 97140 MANUAL THERAPY 1/> REGIONS: CPT

## 2024-12-06 PROCEDURE — 97530 THERAPEUTIC ACTIVITIES: CPT

## 2024-12-06 NOTE — FLOWSHEET NOTE
[facility] - Outpatient Rehabilitation and Therapy [address], [place] [address] office: [phone] fax: [phone]     Physical Therapy: TREATMENT/PROGRESS NOTE   Patient: Mekhi Robles (59 y.o. male)   Examination Date: 2024   :  1965 MRN: [id number]   Visit #: 3   Insurance Allowable Auth Needed   MN []Yes    []No    Insurance: Payor: UNITED HE[employer]CARE / Plan: UNITED HEALTHCARE - CHOICE PLUS / Product Type: *No Product type* /   Insurance ID: [id number] - (Commercial)  Secondary Insurance (if applicable):    Treatment Diagnosis:   L LE Weakness & deconditioning after Covid   Medical Diagnosis:  S/P hip replacement, left [Z96.642]  Arthropathy [M12.9]   Referring Physician: Suleman Navarro MD PCP: Jesse Bennett MD     Plan of care signed (Y/N): Y    Date of Patient follow up with Physician:      Plan of Care Report: NO  POC update due: (10 visits /OR AUTH LIMITS, whichever is less)  1/3/2025                                             Medical History:  Comorbidities:  Osteoarthritis  Other Musculoskeletal Conditions: B TKR, L THR, L Shldr OA - needs TSR but has delayed  Relevant Medical History:                                          Precautions/ Contra-indications:           Latex allergy:  NO  Pacemaker:    NO  Contraindications for Manipulation: unhealthy/ multiple comorbidities   Date of Surgery:   Other:    Red Flags:  None    Suicide Screening:   The patient did not verbalize a primary behavioral concern, suicidal ideation, suicidal intent, or demonstrate suicidal behaviors.    Preferred Language for Healthcare:   [x] English       [] other:    SUBJECTIVE EXAMINATION     Patient stated complaint: Pt reports that he felt fine after last session. Work has been busy. Doesn't know when his R knee is going to give out on him but pain isn't bad today.     Test used Initial score  24   Pain Summary VAS 10 210   Functional questionnaire

## 2024-12-10 ENCOUNTER — HOSPITAL ENCOUNTER (OUTPATIENT)
Dept: PHYSICAL THERAPY | Age: 59
Setting detail: THERAPIES SERIES
Discharge: HOME | End: 2024-12-10
Payer: COMMERCIAL

## 2024-12-10 PROCEDURE — 97140 MANUAL THERAPY 1/> REGIONS: CPT

## 2024-12-10 PROCEDURE — 97530 THERAPEUTIC ACTIVITIES: CPT

## 2024-12-10 NOTE — FLOWSHEET NOTE
Gait Training (37889) for normalization of ambulation patterns and AD training.   Manual Therapy (26875) as indicated to include: Passive Range of Motion, Gr I-IV mobilizations, and Soft Tissue Mobilization  Modalities as needed that may include: Cryotherapy and Electrical Stimulation  Patient education on joint protection, postural re-education, activity modification, and progression of HEP    Plan: Cont POC- Continue emphasis/focus on exercise progression and static and dynamic balance. Next visit plan to progress reps, add new exercises, and continue current phase     Electronically Signed by Isa Reyes, [id number]    Date: 12/10/2024     Note: Portions of this note have been templated and/or copied from initial evaluation, reassessments and prior notes for documentation efficiency.    Note: If patient does not return for scheduled/recommended follow up visits, this note will serve as a discharge from care along with the most recent update on progress.

## 2024-12-13 ENCOUNTER — HOSPITAL ENCOUNTER (OUTPATIENT)
Dept: PHYSICAL THERAPY | Age: 59
Setting detail: THERAPIES SERIES
End: 2024-12-13
Payer: COMMERCIAL

## 2024-12-17 ENCOUNTER — HOSPITAL ENCOUNTER (OUTPATIENT)
Dept: PHYSICAL THERAPY | Age: 59
Setting detail: THERAPIES SERIES
Discharge: HOME | End: 2024-12-17
Payer: COMMERCIAL

## 2024-12-17 PROCEDURE — 97530 THERAPEUTIC ACTIVITIES: CPT | Performed by: PHYSICAL THERAPIST

## 2024-12-17 PROCEDURE — 97110 THERAPEUTIC EXERCISES: CPT | Performed by: PHYSICAL THERAPIST

## 2024-12-17 NOTE — FLOWSHEET NOTE
re-education.    Gait Training (84997) for normalization of ambulation patterns and AD training.   Manual Therapy (17068) as indicated to include: Passive Range of Motion, Gr I-IV mobilizations, and Soft Tissue Mobilization  Modalities as needed that may include: Cryotherapy and Electrical Stimulation  Patient education on joint protection, postural re-education, activity modification, and progression of HEP    Plan: Cont POC- Continue emphasis/focus on exercise progression and static and dynamic balance. Next visit plan to progress reps, add new exercises, and continue current phase     Electronically Signed by Coleen Mendoza, [id number] [id number]      Date: 12/17/2024     Note: Portions of this note have been templated and/or copied from initial evaluation, reassessments and prior notes for documentation efficiency.    Note: If patient does not return for scheduled/recommended follow up visits, this note will serve as a discharge from care along with the most recent update on progress.

## 2024-12-19 ENCOUNTER — OFFICE VISIT (OUTPATIENT)
Dept: FAMILY MEDICINE CLINIC | Age: 59
End: 2024-12-19
Payer: COMMERCIAL

## 2024-12-19 VITALS
BODY MASS INDEX: 40.43 KG/M2 | SYSTOLIC BLOOD PRESSURE: 150 MMHG | HEIGHT: 74 IN | DIASTOLIC BLOOD PRESSURE: 90 MMHG | HEART RATE: 80 BPM | WEIGHT: 315 LBS | OXYGEN SATURATION: 95 %

## 2024-12-19 DIAGNOSIS — E66.01 MORBID OBESITY: Primary | ICD-10-CM

## 2024-12-19 DIAGNOSIS — M19.90 INFLAMMATORY ARTHRITIS: ICD-10-CM

## 2024-12-19 DIAGNOSIS — I10 ESSENTIAL HYPERTENSION, BENIGN: ICD-10-CM

## 2024-12-19 PROCEDURE — 3080F DIAST BP >= 90 MM HG: CPT | Performed by: FAMILY MEDICINE

## 2024-12-19 PROCEDURE — 99213 OFFICE O/P EST LOW 20 MIN: CPT | Performed by: FAMILY MEDICINE

## 2024-12-19 PROCEDURE — 1036F TOBACCO NON-USER: CPT | Performed by: FAMILY MEDICINE

## 2024-12-19 PROCEDURE — 3077F SYST BP >= 140 MM HG: CPT | Performed by: FAMILY MEDICINE

## 2024-12-19 PROCEDURE — G8427 DOCREV CUR MEDS BY ELIG CLIN: HCPCS | Performed by: FAMILY MEDICINE

## 2024-12-19 PROCEDURE — G8484 FLU IMMUNIZE NO ADMIN: HCPCS | Performed by: FAMILY MEDICINE

## 2024-12-19 PROCEDURE — 3017F COLORECTAL CA SCREEN DOC REV: CPT | Performed by: FAMILY MEDICINE

## 2024-12-19 PROCEDURE — G8417 CALC BMI ABV UP PARAM F/U: HCPCS | Performed by: FAMILY MEDICINE

## 2024-12-19 ASSESSMENT — PATIENT HEALTH QUESTIONNAIRE - PHQ9
SUM OF ALL RESPONSES TO PHQ9 QUESTIONS 1 & 2: 0
SUM OF ALL RESPONSES TO PHQ QUESTIONS 1-9: 0
1. LITTLE INTEREST OR PLEASURE IN DOING THINGS: NOT AT ALL
SUM OF ALL RESPONSES TO PHQ QUESTIONS 1-9: 0
SUM OF ALL RESPONSES TO PHQ QUESTIONS 1-9: 0
2. FEELING DOWN, DEPRESSED OR HOPELESS: NOT AT ALL
SUM OF ALL RESPONSES TO PHQ QUESTIONS 1-9: 0

## 2024-12-19 ASSESSMENT — ENCOUNTER SYMPTOMS
BLURRED VISION: 0
ORTHOPNEA: 0
SHORTNESS OF BREATH: 0

## 2024-12-19 NOTE — PROGRESS NOTES
Subjective:     Patient ID:Mekhi Rolbes is a 59 y.o. male.    Hypertension  This is a chronic problem. The current episode started more than 1 year ago. The problem is unchanged. The problem is controlled. Associated symptoms include peripheral edema. Pertinent negatives include no anxiety, blurred vision, chest pain, headaches, malaise/fatigue, neck pain, orthopnea, palpitations, PND, shortness of breath or sweats. Agents associated with hypertension include steroids. There are no known risk factors for coronary artery disease. Past treatments include diuretics, direct vasodilators, calcium channel blockers and angiotensin blockers. The current treatment provides moderate improvement. There are no compliance problems.  There is no history of angina, kidney disease, CAD/MI, CVA, heart failure, left ventricular hypertrophy, PVD or retinopathy. There is no history of chronic renal disease, coarctation of the aorta, hyperaldosteronism, hypercortisolism, hyperparathyroidism, a hypertension causing med, pheochromocytoma, renovascular disease, sleep apnea or a thyroid problem.   Arthritis  Presents for follow-up (inflammatory arthritis due to COVID) visit. He reports no pain, stiffness, joint swelling or joint warmth. The symptoms have been resolved. Affected locations include the left hip.       No Known Allergies    Current Outpatient Medications   Medication Sig Dispense Refill    predniSONE (DELTASONE) 10 MG tablet Take 2 tablets by mouth daily      nebivolol (BYSTOLIC) 5 MG tablet Take 1 tablet by mouth daily 30 tablet 3    amLODIPine (NORVASC) 10 MG tablet Take 0.5 tablets by mouth daily 90 tablet 3    furosemide (LASIX) 40 MG tablet Take 1 tablet by mouth daily 30 tablet 3    acetaminophen (TYLENOL) 500 MG tablet Take 2 tablets by mouth every 8 (eight) hours 120 tablet 3    losartan (COZAAR) 100 MG tablet Take 1 tablet by mouth daily 90 tablet 3    OMEPRAZOLE PO Take by mouth daily as needed (GERD) OTC

## 2024-12-20 ENCOUNTER — HOSPITAL ENCOUNTER (OUTPATIENT)
Dept: PHYSICAL THERAPY | Age: 59
Setting detail: THERAPIES SERIES
Discharge: HOME | End: 2024-12-20
Payer: COMMERCIAL

## 2024-12-20 PROCEDURE — 97110 THERAPEUTIC EXERCISES: CPT

## 2024-12-20 PROCEDURE — 97530 THERAPEUTIC ACTIVITIES: CPT

## 2024-12-20 NOTE — FLOWSHEET NOTE
ADLs as indicated by Functional Deficits.  [x] Progressing: [] Met: [] Not Met: [] Adjusted    Long Term Goals: To be achieved in: 6 weeks  1. Disability index score of 30-% or less for the WOMAC to assist with reaching prior level of function with activities such as walking community distances.  [x] Progressing: [] Met: [] Not Met: [] Adjusted  2. Patient will demonstrate increased AROM of hip to WNLs without pain to allow for proper joint functioning to enable patient to ease with sit-stands.   [x] Progressing: [] Met: [] Not Met: [] Adjusted  3. Patient will demonstrate increased Strength of B LE to at least 4+/5 throughout without pain to allow for proper functional mobility to enable patient to return to navigating stairs.   [x] Progressing: [] Met: [] Not Met: [] Adjusted  4. Patient will return to walking, squatting with normal ADLs and sit-stands without increased symptoms or restriction.   [x] Progressing: [] Met: [] Not Met: [] Adjusted  5. Patient will report 50% improvement in endurance with ADLs.   [x] Progressing: [] Met: [] Not Met: [] Adjusted     Overall Progression Towards Functional goals/ Treatment Progress Update:  [x] Patient is progressing as expected towards functional goals listed.    [] Progression is slowed due to complexities/Impairments listed.  [] Progression has been slowed due to co-morbidities.  [] Plan just implemented, too soon (<30days) to assess goals progression   [] Goals require adjustment due to lack of progress  [] Patient is not progressing as expected and requires additional follow up with physician  [] Other:     TREATMENT PLAN     Frequency/Duration: 2x/week for 4-6 weeks for the following treatment interventions:    Interventions:  Therapeutic Exercise (58514) including: strength training, ROM, and functional mobility  Therapeutic Activities (38237) including: functional mobility training and education.  Neuromuscular Re-education (01889) activation and proprioception,

## 2024-12-24 ENCOUNTER — HOSPITAL ENCOUNTER (OUTPATIENT)
Dept: PHYSICAL THERAPY | Age: 59
Setting detail: THERAPIES SERIES
Discharge: HOME | End: 2024-12-24
Payer: COMMERCIAL

## 2024-12-24 PROCEDURE — 97110 THERAPEUTIC EXERCISES: CPT

## 2024-12-24 PROCEDURE — 97530 THERAPEUTIC ACTIVITIES: CPT

## 2024-12-24 NOTE — FLOWSHEET NOTE
would benefit from continued outpatient therapy services to address the deficits outlined in the patients goals    Return to Play: NA    Prognosis for POC: [x] Good [] Fair  [] Poor    Patient requires continued skilled intervention: [x] Yes  [] No      CHARGE CAPTURE     PT CHARGE GRID   CPT Code (TIMED) minutes # CPT Code (UNTIMED) #     Therex (66580)  14 1  EVAL:LOW (23894 - Typically 20 minutes face-to-face)     Neuromusc. Re-ed (47190)    Re-Eval (65746)     Manual (03178)    Estim Unattended (82443)     Ther. Act (23143) 26 2  Mech. Traction (64134)     Gait (40528)    Dry Needle 1-2 muscle (45817)     Aquatic Therex (28387)    Dry Needle 3+ muscle (43124)     Iontophoresis (67347)    VASO (74906)     Ultrasound (45465)    Group Therapy (53955)     Estim Attended (27574)    Canalith Repositioning (46855)     Physical Performance Test (89028)    Custom orthotic ()     Other:    Other:    Total Timed Code Tx Minutes 40 3       Total Treatment Minutes 40        Charge Justification:  (93951) THERAPEUTIC EXERCISE - Provided verbal/tactile cueing for activities related to strengthening, flexibility, endurance, ROM performed to prevent loss of range of motion, maintain or improve muscular strength or increase flexibility, following either an injury or surgery.   (69476) THERAPEUTIC ACTIVITY - use of dynamic activities to improve functional performance. (Ex include squatting, ascending/descending stairs, walking, bending, lifting, catching, throwing, pushing, pulling, jumping.)  Direct, one on one contact, billed in 15-minute increments.    GOALS     Patient stated goal: improved LE strength, mobility, and flexibility  [x] Progressing: [] Met: [] Not Met: [] Adjusted    Therapist goals for Patient:   Short Term Goals: To be achieved in: 2 weeks  1. Independent in HEP and progression per patient tolerance, in order to prevent re-injury.   [] Progressing: [x] Met: [] Not Met: [] Adjusted  2. Patient will have a

## 2024-12-31 ENCOUNTER — HOSPITAL ENCOUNTER (OUTPATIENT)
Dept: PHYSICAL THERAPY | Age: 59
Setting detail: THERAPIES SERIES
Discharge: HOME | End: 2024-12-31
Payer: COMMERCIAL

## 2024-12-31 PROCEDURE — 97530 THERAPEUTIC ACTIVITIES: CPT

## 2024-12-31 PROCEDURE — 97110 THERAPEUTIC EXERCISES: CPT

## 2024-12-31 NOTE — FLOWSHEET NOTE
[facility] - Outpatient Rehabilitation and Therapy [address], [place] [address] office: [phone] fax: [phone]     Physical Therapy: TREATMENT/PROGRESS NOTE   Patient: Mekhi Robles (59 y.o. male)   Examination Date: 2024   :  1965 MRN: [id number]   Visit #: 8   Insurance Allowable Auth Needed   [place] []Yes    []No    Insurance: Payor: UNITED HEALTHCARE / Plan: UNITED HEALTHCARE - CHOICE PLUS / Product Type: *No Product type* /   Insurance ID: [id number] - (Commercial)  Secondary Insurance (if applicable):    Treatment Diagnosis:   L LE Weakness & deconditioning after Covid   Medical Diagnosis:  S/P hip replacement, left [Z96.642]  Arthropathy [M12.9]   Referring Physician: Suleman Navarro MD PCP: Jesse Bennett MD     Plan of care signed (Y/N): Y    Date of Patient follow up with Physician:      Plan of Care Report: NO  POC update due: (10 visits /OR AUTH LIMITS, whichever is less)  2025                                             Medical History:  Comorbidities:  Osteoarthritis  Other Musculoskeletal Conditions: B TKR, L THR, L Shldr OA - needs TSR but has delayed  Relevant Medical History:                                          Precautions/ Contra-indications:           Latex allergy:  NO  Pacemaker:    NO  Contraindications for Manipulation: unhealthy/ multiple comorbidities   Date of Surgery:   Other:    Red Flags:  None    Suicide Screening:   The patient did not verbalize a primary behavioral concern, suicidal ideation, suicidal intent, or demonstrate suicidal behaviors.    Preferred Language for Healthcare:   [x] English       [] other:    SUBJECTIVE EXAMINATION     Patient stated complaint: Pt reports that he did have some occasional random R knee giving out on him over [place], not painful feels tight. Was able to get work problem in [place] resolved so things have at least been quieter the past few days.      Test used Initial score  24

## 2025-01-03 ENCOUNTER — APPOINTMENT (OUTPATIENT)
Dept: PHYSICAL THERAPY | Age: 60
End: 2025-01-03
Payer: COMMERCIAL

## 2025-01-07 ENCOUNTER — HOSPITAL ENCOUNTER (OUTPATIENT)
Dept: PHYSICAL THERAPY | Age: 60
Setting detail: THERAPIES SERIES
End: 2025-01-07
Payer: COMMERCIAL

## 2025-01-10 ENCOUNTER — HOSPITAL ENCOUNTER (OUTPATIENT)
Dept: PHYSICAL THERAPY | Age: 60
Setting detail: THERAPIES SERIES
Discharge: HOME | End: 2025-01-10
Payer: COMMERCIAL

## 2025-01-10 PROCEDURE — 97530 THERAPEUTIC ACTIVITIES: CPT

## 2025-01-10 PROCEDURE — 97110 THERAPEUTIC EXERCISES: CPT

## 2025-01-10 PROCEDURE — 97112 NEUROMUSCULAR REEDUCATION: CPT

## 2025-01-10 NOTE — FLOWSHEET NOTE
services to address the deficits outlined in the patients goals    Return to Play: NA    Prognosis for POC: [x] Good [] Fair  [] Poor    Patient requires continued skilled intervention: [x] Yes  [] No      CHARGE CAPTURE     PT CHARGE GRID   CPT Code (TIMED) minutes # CPT Code (UNTIMED) #     Therex (89259)  16 1  EVAL:LOW (55902 - Typically 20 minutes face-to-face)     Neuromusc. Re-ed (02828) 8 1  Re-Eval (56490)     Manual (87752)    Estim Unattended (53922)     Ther. Act (98673) 16 1  Mech. Traction (40121)     Gait (19601)    Dry Needle 1-2 muscle (27936)     Aquatic Therex (56866)    Dry Needle 3+ muscle (61075)     Iontophoresis (08216)    VASO (73075)     Ultrasound (86974)    Group Therapy (30225)     Estim Attended (61201)    Canalith Repositioning (17748)     Physical Performance Test (28911)    Custom orthotic ()     Other:    Other:    Total Timed Code Tx Minutes 40 3       Total Treatment Minutes 40        Charge Justification:  (06801) THERAPEUTIC EXERCISE - Provided verbal/tactile cueing for activities related to strengthening, flexibility, endurance, ROM performed to prevent loss of range of motion, maintain or improve muscular strength or increase flexibility, following either an injury or surgery.   (75953) NEUROMUSCULAR RE-EDUCATION - Therapeutic procedure, 1 or more areas, each 15 minutes; neuromuscular reeducation of movement, balance, coordination, kinesthetic sense, posture, and/or proprioception for sitting and/or standing activities  (13215) THERAPEUTIC ACTIVITY - use of dynamic activities to improve functional performance. (Ex include squatting, ascending/descending stairs, walking, bending, lifting, catching, throwing, pushing, pulling, jumping.)  Direct, one on one contact, billed in 15-minute increments.    GOALS     Patient stated goal: improved LE strength, mobility, and flexibility  [x] Progressing: [] Met: [] Not Met: [] Adjusted    Therapist goals for Patient:   Short Term

## 2025-01-14 ENCOUNTER — HOSPITAL ENCOUNTER (OUTPATIENT)
Dept: PHYSICAL THERAPY | Age: 60
Setting detail: THERAPIES SERIES
Discharge: HOME | End: 2025-01-14
Payer: COMMERCIAL

## 2025-01-14 PROCEDURE — 97110 THERAPEUTIC EXERCISES: CPT

## 2025-01-14 PROCEDURE — 97112 NEUROMUSCULAR REEDUCATION: CPT

## 2025-01-14 PROCEDURE — 97530 THERAPEUTIC ACTIVITIES: CPT

## 2025-01-14 NOTE — FLOWSHEET NOTE
Goals: To be achieved in: 2 weeks  1. Independent in HEP and progression per patient tolerance, in order to prevent re-injury.   [] Progressing: [x] Met: [] Not Met: [] Adjusted  2. Patient will have a decrease in pain to <2/10 to facilitate improvement in movement, function, and ADLs as indicated by Functional Deficits.  [] Progressing: [x] Met: [] Not Met: [] Adjusted    Long Term Goals: To be achieved in: 6 weeks  1. Disability index score of 30-% or less for the WOMAC to assist with reaching prior level of function with activities such as walking community distances.  [x] Progressing: [] Met: [] Not Met: [] Adjusted  2. Patient will demonstrate increased AROM of hip to WNLs without pain to allow for proper joint functioning to enable patient to ease with sit-stands.   [x] Progressing: [] Met: [] Not Met: [] Adjusted  3. Patient will demonstrate increased Strength of B LE to at least 4+/5 throughout without pain to allow for proper functional mobility to enable patient to return to navigating stairs.   [x] Progressing: [] Met: [] Not Met: [] Adjusted  4. Patient will return to walking, squatting with normal ADLs and sit-stands without increased symptoms or restriction.   [x] Progressing: [] Met: [] Not Met: [] Adjusted  5. Patient will report 50% improvement in endurance with ADLs.   [x] Progressing: [] Met: [] Not Met: [] Adjusted     Overall Progression Towards Functional goals/ Treatment Progress Update:  [x] Patient is progressing as expected towards functional goals listed.    [] Progression is slowed due to complexities/Impairments listed.  [] Progression has been slowed due to co-morbidities.  [] Plan just implemented, too soon (<30days) to assess goals progression   [] Goals require adjustment due to lack of progress  [] Patient is not progressing as expected and requires additional follow up with physician  [] Other:     TREATMENT PLAN     Frequency/Duration: 2x/week for 4-6 weeks for the following

## 2025-01-17 ENCOUNTER — APPOINTMENT (OUTPATIENT)
Dept: PHYSICAL THERAPY | Age: 60
End: 2025-01-17
Payer: COMMERCIAL

## 2025-01-17 ENCOUNTER — OFFICE VISIT (OUTPATIENT)
Dept: FAMILY MEDICINE CLINIC | Age: 60
End: 2025-01-17
Payer: COMMERCIAL

## 2025-01-17 VITALS
OXYGEN SATURATION: 96 % | WEIGHT: 315 LBS | BODY MASS INDEX: 40.43 KG/M2 | SYSTOLIC BLOOD PRESSURE: 138 MMHG | HEART RATE: 91 BPM | HEIGHT: 74 IN | DIASTOLIC BLOOD PRESSURE: 88 MMHG

## 2025-01-17 DIAGNOSIS — M19.90 INFLAMMATORY ARTHRITIS: ICD-10-CM

## 2025-01-17 DIAGNOSIS — I42.9 PRIMARY CARDIOMYOPATHY (HCC): Primary | ICD-10-CM

## 2025-01-17 LAB
ANION GAP SERPL CALCULATED.3IONS-SCNC: 10 MMOL/L (ref 3–16)
BASOPHILS # BLD: 0 K/UL (ref 0–0.2)
BASOPHILS NFR BLD: 0.3 %
BUN SERPL-MCNC: 15 MG/DL (ref 7–20)
CALCIUM SERPL-MCNC: 9.6 MG/DL (ref 8.3–10.6)
CHLORIDE SERPL-SCNC: 105 MMOL/L (ref 99–110)
CO2 SERPL-SCNC: 28 MMOL/L (ref 21–32)
CREAT SERPL-MCNC: 0.9 MG/DL (ref 0.8–1.3)
DEPRECATED RDW RBC AUTO: 14 % (ref 12.4–15.4)
EOSINOPHIL # BLD: 0.1 K/UL (ref 0–0.6)
EOSINOPHIL NFR BLD: 2.3 %
GFR SERPLBLD CREATININE-BSD FMLA CKD-EPI: >90 ML/MIN/{1.73_M2}
GLUCOSE SERPL-MCNC: 212 MG/DL (ref 70–99)
HCT VFR BLD AUTO: 37.3 % (ref 40.5–52.5)
HGB BLD-MCNC: 12.5 G/DL (ref 13.5–17.5)
LYMPHOCYTES # BLD: 1.3 K/UL (ref 1–5.1)
LYMPHOCYTES NFR BLD: 20 %
MCH RBC QN AUTO: 34.1 PG (ref 26–34)
MCHC RBC AUTO-ENTMCNC: 33.6 G/DL (ref 31–36)
MCV RBC AUTO: 101.6 FL (ref 80–100)
MONOCYTES # BLD: 0.5 K/UL (ref 0–1.3)
MONOCYTES NFR BLD: 7.3 %
NEUTROPHILS # BLD: 4.4 K/UL (ref 1.7–7.7)
NEUTROPHILS NFR BLD: 70.1 %
PLATELET # BLD AUTO: 246 K/UL (ref 135–450)
PMV BLD AUTO: 9.2 FL (ref 5–10.5)
POTASSIUM SERPL-SCNC: 3.8 MMOL/L (ref 3.5–5.1)
RBC # BLD AUTO: 3.67 M/UL (ref 4.2–5.9)
SODIUM SERPL-SCNC: 143 MMOL/L (ref 136–145)
WBC # BLD AUTO: 6.3 K/UL (ref 4–11)

## 2025-01-17 PROCEDURE — G8427 DOCREV CUR MEDS BY ELIG CLIN: HCPCS | Performed by: FAMILY MEDICINE

## 2025-01-17 PROCEDURE — 1036F TOBACCO NON-USER: CPT | Performed by: FAMILY MEDICINE

## 2025-01-17 PROCEDURE — 3075F SYST BP GE 130 - 139MM HG: CPT | Performed by: FAMILY MEDICINE

## 2025-01-17 PROCEDURE — G8417 CALC BMI ABV UP PARAM F/U: HCPCS | Performed by: FAMILY MEDICINE

## 2025-01-17 PROCEDURE — 99213 OFFICE O/P EST LOW 20 MIN: CPT | Performed by: FAMILY MEDICINE

## 2025-01-17 PROCEDURE — 3079F DIAST BP 80-89 MM HG: CPT | Performed by: FAMILY MEDICINE

## 2025-01-17 PROCEDURE — 3017F COLORECTAL CA SCREEN DOC REV: CPT | Performed by: FAMILY MEDICINE

## 2025-01-17 SDOH — ECONOMIC STABILITY: FOOD INSECURITY: WITHIN THE PAST 12 MONTHS, YOU WORRIED THAT YOUR FOOD WOULD RUN OUT BEFORE YOU GOT MONEY TO BUY MORE.: NEVER TRUE

## 2025-01-17 SDOH — ECONOMIC STABILITY: FOOD INSECURITY: WITHIN THE PAST 12 MONTHS, THE FOOD YOU BOUGHT JUST DIDN'T LAST AND YOU DIDN'T HAVE MONEY TO GET MORE.: NEVER TRUE

## 2025-01-17 ASSESSMENT — PATIENT HEALTH QUESTIONNAIRE - PHQ9
SUM OF ALL RESPONSES TO PHQ QUESTIONS 1-9: 0
2. FEELING DOWN, DEPRESSED OR HOPELESS: NOT AT ALL
SUM OF ALL RESPONSES TO PHQ QUESTIONS 1-9: 0
SUM OF ALL RESPONSES TO PHQ9 QUESTIONS 1 & 2: 0
1. LITTLE INTEREST OR PLEASURE IN DOING THINGS: NOT AT ALL

## 2025-01-17 ASSESSMENT — ENCOUNTER SYMPTOMS: DIARRHEA: 0

## 2025-01-17 NOTE — PROGRESS NOTES
Left 06/27/2024    LEFT TOTAL HIP ARTHROPLASTY, ANTERIOR APPROACH performed by Abram Alvarado MD at [facility] OR    TOTAL KNEE ARTHROPLASTY Bilateral 01/2022    True       Social History     Socioeconomic History    Marital status:      Spouse name: Not on file    Number of children: 2    Years of education: Not on file    Highest education level: Not on file   Occupational History    Occupation:  at [employer]   Tobacco Use    Smoking status: Never    Smokeless tobacco: Never   Vaping Use    Vaping status: Never Used   Substance and Sexual Activity    Alcohol use: Yes     Alcohol/week: 1.0 standard drink of alcohol     Types: 1 Glasses of wine per week     Comment: occasional    Drug use: No    Sexual activity: Yes     Partners: Female   Other Topics Concern    Not on file   Social History Narrative    Exercise--no    + seatbelts    Happily --mom lives with them--2 kids and 1 kid with [employer]    Works>50--;lots of stress but good support     Social Determinants of Health     Financial Resource Strain: Low Risk  (5/1/2024)    Overall Financial Resource Strain (CARDIA)     Difficulty of Paying Living Expenses: Not hard at all   Food Insecurity: No Food Insecurity (1/17/2025)    Hunger Vital Sign     Worried About Running Out of Food in the Last Year: Never true     Ran Out of Food in the Last Year: Never true   Transportation Needs: No Transportation Needs (1/17/2025)    PRAPARE - Transportation     Lack of Transportation (Medical): No     Lack of Transportation (Non-Medical): No   Physical Activity: Not on file   Stress: Not on file   Social Connections: Not on file   Intimate Partner Violence: Unknown (1/21/2024)    Received from [facility], [facility]    Interpersonal Safety     Feel physically or emotionally unsafe where currently live: Not on file     Harm by anyone: Not on file     Emotionally Harmed: Not on file

## 2025-01-18 DIAGNOSIS — R73.9 HYPERGLYCEMIA: ICD-10-CM

## 2025-01-18 DIAGNOSIS — R73.9 HYPERGLYCEMIA: Primary | ICD-10-CM

## 2025-01-18 LAB
EST. AVERAGE GLUCOSE BLD GHB EST-MCNC: 139.9 MG/DL
HBA1C MFR BLD: 6.5 %

## 2025-01-21 ENCOUNTER — HOSPITAL ENCOUNTER (OUTPATIENT)
Dept: PHYSICAL THERAPY | Age: 60
Setting detail: THERAPIES SERIES
Discharge: HOME | End: 2025-01-21
Payer: COMMERCIAL

## 2025-01-21 PROCEDURE — 97110 THERAPEUTIC EXERCISES: CPT

## 2025-01-21 PROCEDURE — 97112 NEUROMUSCULAR REEDUCATION: CPT

## 2025-01-21 NOTE — FLOWSHEET NOTE
[facility] - Outpatient Rehabilitation and Therapy [address], [place] [address] office: [phone] fax: [phone]     Physical Therapy: TREATMENT/PROGRESS NOTE   Patient: Mekhi Robles (59 y.o. male)   Examination Date: 2025   :  1965 MRN: [id number]   Visit #: 11   Insurance Allowable Auth Needed   MN (8 in ) []Yes    [x]No    Insurance: Payor: UNITED HEALTHCARE / Plan: UNITED HEALTHCARE - CHOICE PLUS / Product Type: *No Product type* /   Insurance ID: [id number] - (Commercial)  Secondary Insurance (if applicable):    Treatment Diagnosis:   L LE Weakness & deconditioning after Covid   Medical Diagnosis:  S/P hip replacement, left [Z96.642]  Arthropathy [M12.9]   Referring Physician: Suleman Navarro MD PCP: Jesse Bennett MD     Plan of care signed (Y/N): Y    Date of Patient follow up with Physician:      Plan of Care Report: NO  POC update due: (10 visits /OR AUTH LIMITS, whichever is less)  2025                                             Medical History:  Comorbidities:  Osteoarthritis  Other Musculoskeletal Conditions: B TKR, L THR, L Shldr OA - needs TSR but has delayed  Relevant Medical History:                                          Precautions/ Contra-indications:           Latex allergy:  NO  Pacemaker:    NO  Contraindications for Manipulation: unhealthy/ multiple comorbidities   Date of Surgery:   Other:    Red Flags:  None    Suicide Screening:   The patient did not verbalize a primary behavioral concern, suicidal ideation, suicidal intent, or demonstrate suicidal behaviors.    Preferred Language for Healthcare:   [x] English       [] other:    SUBJECTIVE EXAMINATION     Patient stated complaint: Pt reports that Friday was a rough day, could hardly get out of bed that morning d/t soreness all over his body. Doing better today, just stiff. Likes the [place] estim. Still gets some occasional knee giving out on him but isn't as bad as it

## 2025-01-24 ENCOUNTER — HOSPITAL ENCOUNTER (OUTPATIENT)
Dept: PHYSICAL THERAPY | Age: 60
Setting detail: THERAPIES SERIES
Discharge: HOME | End: 2025-01-24
Payer: COMMERCIAL

## 2025-01-24 PROCEDURE — 97530 THERAPEUTIC ACTIVITIES: CPT

## 2025-01-24 PROCEDURE — 97110 THERAPEUTIC EXERCISES: CPT

## 2025-01-24 NOTE — FLOWSHEET NOTE
[facility] - Outpatient Rehabilitation and Therapy [address], [place] [address] office: [phone] fax: [phone]     Physical Therapy: TREATMENT/PROGRESS NOTE   Patient: Mekhi Robles (59 y.o. male)   Examination Date: 2025   :  1965 MRN: [id number]   Visit #: 12   Insurance Allowable Auth Needed   MN (8 in ) []Yes    [x]No    Insurance: Payor: UNITED HEALTHCARE / Plan: UNITED HEALTHCARE - CHOICE PLUS / Product Type: *No Product type* /   Insurance ID: [id number] - (Commercial)  Secondary Insurance (if applicable):    Treatment Diagnosis:   L LE Weakness & deconditioning after Covid   Medical Diagnosis:  S/P hip replacement, left [Z96.642]  Arthropathy [M12.9]   Referring Physician: Suleman Navarro MD PCP: Jesse Bennett MD     Plan of care signed (Y/N): Y    Date of Patient follow up with Physician:      Plan of Care Report: NO  POC update due: (10 visits /OR AUTH LIMITS, whichever is less)  2025                                             Medical History:  Comorbidities:  Osteoarthritis  Other Musculoskeletal Conditions: B TKR, L THR, L Shldr OA - needs TSR but has delayed  Relevant Medical History:                                          Precautions/ Contra-indications:           Latex allergy:  NO  Pacemaker:    NO  Contraindications for Manipulation: unhealthy/ multiple comorbidities   Date of Surgery:   Other:    Red Flags:  None    Suicide Screening:   The patient did not verbalize a primary behavioral concern, suicidal ideation, suicidal intent, or demonstrate suicidal behaviors.    Preferred Language for Healthcare:   [x] English       [] other:    SUBJECTIVE EXAMINATION     Patient stated complaint: Pt reports only one instance of the knee giving out on him the past two days.      Test used Initial score  24   Pain Summary VAS 2/10 1/10   Functional questionnaire WOMAC 52/ 54% : 32/ 33%   Other:              Pain:  Pain

## 2025-01-28 ENCOUNTER — HOSPITAL ENCOUNTER (OUTPATIENT)
Dept: PHYSICAL THERAPY | Age: 60
Setting detail: THERAPIES SERIES
Discharge: HOME | End: 2025-01-28
Payer: COMMERCIAL

## 2025-01-31 ENCOUNTER — HOSPITAL ENCOUNTER (OUTPATIENT)
Dept: PHYSICAL THERAPY | Age: 60
Setting detail: THERAPIES SERIES
Discharge: HOME | End: 2025-01-31
Payer: COMMERCIAL

## 2025-01-31 PROCEDURE — 97530 THERAPEUTIC ACTIVITIES: CPT

## 2025-01-31 PROCEDURE — 97112 NEUROMUSCULAR REEDUCATION: CPT

## 2025-01-31 NOTE — FLOWSHEET NOTE
[facility] - Outpatient Rehabilitation and Therapy [address], [place] [address] office: [phone] fax: [phone]     Physical Therapy: TREATMENT/PROGRESS NOTE   Patient: Mekhi Robles (59 y.o. male)   Examination Date: 2025   :  1965 MRN: [id number]   Visit #: 13   Insurance Allowable Auth Needed   MN (8 in ) []Yes    [x]No    Insurance: Payor: [employer] / Plan: [employer] - CHOICE PLUS / Product Type: *No Product type* /   Insurance ID: [id number] - (Commercial)  Secondary Insurance (if applicable):    Treatment Diagnosis:   L LE Weakness & deconditioning after Covid   Medical Diagnosis:  S/P hip replacement, left [Z96.642]  Arthropathy [M12.9]   Referring Physician: Suleman Navarro MD PCP: Jesse Bennett MD     Plan of care signed (Y/N): Y    Date of Patient follow up with Physician:      Plan of Care Report: NO  POC update due: (10 visits /OR AUTH LIMITS, whichever is less)  2025                                             Medical History:  Comorbidities:  Osteoarthritis  Other Musculoskeletal Conditions: B TKR, L THR, L Shldr OA - needs TSR but has delayed  Relevant Medical History:                                          Precautions/ Contra-indications:           Latex allergy:  NO  Pacemaker:    NO  Contraindications for Manipulation: unhealthy/ multiple comorbidities   Date of Surgery:   Other:    Red Flags:  None    Suicide Screening:   The patient did not verbalize a primary behavioral concern, suicidal ideation, suicidal intent, or demonstrate suicidal behaviors.    Preferred Language for Healthcare:   [x] English       [] other:    SUBJECTIVE EXAMINATION     Patient stated complaint: Pt feels pretty stiff this morning but doing alright. Would like to go over machines he can use because he will be joining [email or web address].      Test used Initial score  24   Pain Summary VAS 2/10 1/10   Functional

## 2025-02-03 ENCOUNTER — HOSPITAL ENCOUNTER (OUTPATIENT)
Dept: PHYSICAL THERAPY | Age: 60
Setting detail: THERAPIES SERIES
Discharge: HOME | End: 2025-02-03
Payer: COMMERCIAL

## 2025-02-03 PROCEDURE — 97112 NEUROMUSCULAR REEDUCATION: CPT | Performed by: PHYSICAL THERAPIST

## 2025-02-03 PROCEDURE — 97530 THERAPEUTIC ACTIVITIES: CPT | Performed by: PHYSICAL THERAPIST

## 2025-02-03 NOTE — FLOWSHEET NOTE
[facility] - Outpatient Rehabilitation and Therapy [address], [place] [address] office: [phone] fax: [phone]     Physical Therapy: TREATMENT/PROGRESS NOTE   Patient: Mekhi Robles (59 y.o. male)   Examination Date: 2025   :  1965 MRN: [id number]   Visit #: 14   Insurance Allowable Auth Needed   MN (8 in ) []Yes    [x]No    Insurance: Payor: [employer] / Plan: [employer] - CHOICE PLUS / Product Type: *No Product type* /   Insurance ID: [id number] - (Commercial)  Secondary Insurance (if applicable):    Treatment Diagnosis:   L LE Weakness & deconditioning after Covid   Medical Diagnosis:  S/P hip replacement, left [Z96.642]  Arthropathy [M12.9]   Referring Physician: Suleman Navarro MD PCP: Jesse Bennett MD     Plan of care signed (Y/N): Y    Date of Patient follow up with Physician:      Plan of Care Report: NO  POC update due: (10 visits /OR AUTH LIMITS, whichever is less)  3/5/2025                                             Medical History:  Comorbidities:  Osteoarthritis  Other Musculoskeletal Conditions: B TKR, L THR, L Shldr OA - needs TSR but has delayed  Relevant Medical History:                                          Precautions/ Contra-indications:           Latex allergy:  NO  Pacemaker:    NO  Contraindications for Manipulation: unhealthy/ multiple comorbidities   Date of Surgery:   Other:    Red Flags:  None    Suicide Screening:   The patient did not verbalize a primary behavioral concern, suicidal ideation, suicidal intent, or demonstrate suicidal behaviors.    Preferred Language for Healthcare:   [x] English       [] other:    SUBJECTIVE EXAMINATION     Patient stated complaint: Pt has good and bad days, impatient with slower progress.     Test used Initial score  24   Pain Summary VAS 10 1/10   Functional questionnaire WOMAC 52/ 54% : 32/ 33%   Other:              Pain:  Pain location: B quads L >

## 2025-02-04 RX ORDER — AMLODIPINE BESYLATE 10 MG/1
5 TABLET ORAL DAILY
Qty: 30 TABLET | Refills: 0 | Status: SHIPPED | OUTPATIENT
Start: 2025-02-04

## 2025-02-07 ENCOUNTER — HOSPITAL ENCOUNTER (OUTPATIENT)
Dept: PHYSICAL THERAPY | Age: 60
Setting detail: THERAPIES SERIES
End: 2025-02-07
Payer: COMMERCIAL

## 2025-02-10 ENCOUNTER — HOSPITAL ENCOUNTER (OUTPATIENT)
Dept: PHYSICAL THERAPY | Age: 60
Setting detail: THERAPIES SERIES
Discharge: HOME | End: 2025-02-10
Payer: COMMERCIAL

## 2025-02-10 DIAGNOSIS — M19.012 PRIMARY OSTEOARTHRITIS OF LEFT SHOULDER: Primary | ICD-10-CM

## 2025-02-10 PROCEDURE — 97530 THERAPEUTIC ACTIVITIES: CPT

## 2025-02-10 PROCEDURE — 97112 NEUROMUSCULAR REEDUCATION: CPT

## 2025-02-10 NOTE — FLOWSHEET NOTE
[facility] - Outpatient Rehabilitation and Therapy [address], [place] [address] office: [phone] fax: [phone]     Physical Therapy: TREATMENT/PROGRESS NOTE   Patient: Mekhi Robles (59 y.o. male)   Examination Date: 02/10/2025   :  1965 MRN: [id number]   Visit #: 15   Insurance Allowable Auth Needed   MN (8 in ) []Yes    [x]No    Insurance: Payor: [employer] / Plan: [employer] HE[employer] - CHOICE PLUS / Product Type: *No Product type* /   Insurance ID: [id number] - (Commercial)  Secondary Insurance (if applicable):    Treatment Diagnosis:   L LE Weakness & deconditioning after Covid   Medical Diagnosis:  S/P hip replacement, left [Z96.642]  Arthropathy [M12.9]   Referring Physician: Suleman Navarro MD PCP: Jesse Bennett MD     Plan of care signed (Y/N): Y    Date of Patient follow up with Physician:      Plan of Care Report: NO  POC update due: (10 visits /OR AUTH LIMITS, whichever is less)  3/12/2025                                             Medical History:  Comorbidities:  Osteoarthritis  Other Musculoskeletal Conditions: B TKR, L THR, L Shldr OA - needs TSR but has delayed  Relevant Medical History:                                          Precautions/ Contra-indications:           Latex allergy:  NO  Pacemaker:    NO  Contraindications for Manipulation: unhealthy/ multiple comorbidities   Date of Surgery:   Other:    Red Flags:  None    Suicide Screening:   The patient did not verbalize a primary behavioral concern, suicidal ideation, suicidal intent, or demonstrate suicidal behaviors.    Preferred Language for Healthcare:   [x] English       [] other:    SUBJECTIVE EXAMINATION     Patient stated complaint: Pt came to PT to get a break from work today, it has been busy and time consuming. Thurs will probably be his last appt then will return for whenever he gets his shldr done.      Test used Initial score  11/19/24 02/10/2025   Pain Summary VAS

## 2025-02-13 ENCOUNTER — HOSPITAL ENCOUNTER (OUTPATIENT)
Dept: PHYSICAL THERAPY | Age: 60
Setting detail: THERAPIES SERIES
Discharge: HOME | End: 2025-02-13
Payer: COMMERCIAL

## 2025-02-13 PROCEDURE — 97530 THERAPEUTIC ACTIVITIES: CPT | Performed by: PHYSICAL THERAPIST

## 2025-02-13 PROCEDURE — 97112 NEUROMUSCULAR REEDUCATION: CPT | Performed by: PHYSICAL THERAPIST

## 2025-02-13 NOTE — FLOWSHEET NOTE
[facility] - Outpatient Rehabilitation and Therapy [address], [place] [address] office: [phone] fax: [phone]     Physical Therapy: TREATMENT/DISCHARGE NOTE   Patient: Mekhi Robles (59 y.o. male)   Examination Date: 2025   :  1965 MRN: [id number]   Visit #: 16   Insurance Allowable Auth Needed   MN (8 in ) []Yes    [x]No    Insurance: Payor: [employer] / Plan: UNITED HEALTHCARE - CHOICE PLUS / Product Type: *No Product type* /   Insurance ID: [id number] - (Commercial)  Secondary Insurance (if applicable):    Treatment Diagnosis:   L LE Weakness & deconditioning after Covid   Medical Diagnosis:  S/P hip replacement, left [Z96.642]  Arthropathy [M12.9]   Referring Physician: Suleman Navarro MD PCP: Jesse Bennett MD     Plan of care signed (Y/N): Y    Date of Patient follow up with Physician:      Plan of Care Report: NO  POC update due: (10 visits /OR AUTH LIMITS, whichever is less)  3/14/2025                                             Medical History:  Comorbidities:  Osteoarthritis  Other Musculoskeletal Conditions: B TKR, L THR, L Shldr OA - needs TSR but has delayed  Relevant Medical History:                                          Precautions/ Contra-indications:           Latex allergy:  NO  Pacemaker:    NO  Contraindications for Manipulation: unhealthy/ multiple comorbidities   Date of Surgery:   Other:    Red Flags:  None    Suicide Screening:   The patient did not verbalize a primary behavioral concern, suicidal ideation, suicidal intent, or demonstrate suicidal behaviors.    Preferred Language for Healthcare:   [x] English       [] other:    SUBJECTIVE EXAMINATION     Patient stated complaint: Patient continues to have L quad weakness. He has to continue to lift L LE into the car. Today will be his last appt then will return for whenever he gets his L TSR.     Test used Initial score  24   Pain Summary VAS 2/10 1/10

## 2025-03-10 RX ORDER — NEBIVOLOL 5 MG/1
5 TABLET ORAL DAILY
Qty: 30 TABLET | Refills: 3 | Status: SHIPPED | OUTPATIENT
Start: 2025-03-10

## 2025-03-28 DIAGNOSIS — Z91.89 AT INCREASED RISK FOR EXPOSURE TO MEASLES VIRUS: Primary | ICD-10-CM

## 2025-04-13 RX ORDER — AMLODIPINE BESYLATE 10 MG/1
5 TABLET ORAL DAILY
Qty: 30 TABLET | Refills: 0 | OUTPATIENT
Start: 2025-04-13

## 2025-04-13 RX ORDER — AMLODIPINE BESYLATE 10 MG/1
TABLET ORAL
Qty: 45 TABLET | Refills: 3 | Status: SHIPPED | OUTPATIENT
Start: 2025-04-13

## 2025-05-02 DIAGNOSIS — Z91.89 AT INCREASED RISK FOR EXPOSURE TO MEASLES VIRUS: ICD-10-CM

## 2025-05-06 ENCOUNTER — RESULTS FOLLOW-UP (OUTPATIENT)
Dept: FAMILY MEDICINE CLINIC | Age: 60
End: 2025-05-06

## 2025-05-06 LAB
MEV IGG SER IA-ACNC: 7 AU/ML
MEV IGM SER IA-ACNC: 0.16 AU (ref 0–0.79)

## 2025-05-20 RX ORDER — LOSARTAN POTASSIUM 100 MG/1
100 TABLET ORAL DAILY
Qty: 90 TABLET | Refills: 3 | Status: SHIPPED | OUTPATIENT
Start: 2025-05-20

## 2025-06-20 ENCOUNTER — OFFICE VISIT (OUTPATIENT)
Dept: ORTHOPEDIC SURGERY | Age: 60
End: 2025-06-20
Payer: COMMERCIAL

## 2025-06-20 ENCOUNTER — OFFICE VISIT (OUTPATIENT)
Dept: FAMILY MEDICINE CLINIC | Age: 60
End: 2025-06-20
Payer: COMMERCIAL

## 2025-06-20 VITALS
BODY MASS INDEX: 40.43 KG/M2 | HEART RATE: 81 BPM | DIASTOLIC BLOOD PRESSURE: 80 MMHG | TEMPERATURE: 97 F | WEIGHT: 315 LBS | OXYGEN SATURATION: 96 % | SYSTOLIC BLOOD PRESSURE: 132 MMHG | HEIGHT: 74 IN

## 2025-06-20 VITALS — WEIGHT: 315 LBS | HEIGHT: 74 IN | BODY MASS INDEX: 40.43 KG/M2

## 2025-06-20 DIAGNOSIS — M72.2 PLANTAR FASCIITIS OF LEFT FOOT: Primary | ICD-10-CM

## 2025-06-20 DIAGNOSIS — M19.012 PRIMARY OSTEOARTHRITIS OF LEFT SHOULDER: Primary | ICD-10-CM

## 2025-06-20 PROCEDURE — 1036F TOBACCO NON-USER: CPT | Performed by: ORTHOPAEDIC SURGERY

## 2025-06-20 PROCEDURE — 3079F DIAST BP 80-89 MM HG: CPT

## 2025-06-20 PROCEDURE — G8427 DOCREV CUR MEDS BY ELIG CLIN: HCPCS | Performed by: ORTHOPAEDIC SURGERY

## 2025-06-20 PROCEDURE — 99213 OFFICE O/P EST LOW 20 MIN: CPT

## 2025-06-20 PROCEDURE — 3017F COLORECTAL CA SCREEN DOC REV: CPT

## 2025-06-20 PROCEDURE — G8417 CALC BMI ABV UP PARAM F/U: HCPCS

## 2025-06-20 PROCEDURE — 3075F SYST BP GE 130 - 139MM HG: CPT

## 2025-06-20 PROCEDURE — G8427 DOCREV CUR MEDS BY ELIG CLIN: HCPCS

## 2025-06-20 PROCEDURE — G8417 CALC BMI ABV UP PARAM F/U: HCPCS | Performed by: ORTHOPAEDIC SURGERY

## 2025-06-20 PROCEDURE — 3017F COLORECTAL CA SCREEN DOC REV: CPT | Performed by: ORTHOPAEDIC SURGERY

## 2025-06-20 PROCEDURE — 99213 OFFICE O/P EST LOW 20 MIN: CPT | Performed by: ORTHOPAEDIC SURGERY

## 2025-06-20 PROCEDURE — 1036F TOBACCO NON-USER: CPT

## 2025-06-20 ASSESSMENT — ENCOUNTER SYMPTOMS
BACK PAIN: 0
COUGH: 0
CHEST TIGHTNESS: 0
COLOR CHANGE: 0
SHORTNESS OF BREATH: 0

## 2025-06-20 NOTE — PROGRESS NOTES
Negative for chest pain and palpitations.   Musculoskeletal:  Positive for arthralgias (L foot, arch). Negative for back pain, gait problem, joint swelling and myalgias.   Skin:  Negative for color change and wound.   Neurological:  Negative for dizziness, weakness, light-headedness, numbness and headaches.   Psychiatric/Behavioral:  Negative for dysphoric mood and sleep disturbance. The patient is not nervous/anxious.        Meds -    losartan (COZAAR) 100 MG tablet    amLODIPine (NORVASC) 10 MG tablet    nebivolol (BYSTOLIC) 5 MG tablet    predniSONE (DELTASONE) 10 MG tablet    acetaminophen (TYLENOL) 500 MG tablet    OMEPRAZOLE PO    Multiple Vitamins-Minerals (THERAPEUTIC MULTIVITAMIN-MINERALS) tablet    furosemide (LASIX) 40 MG tablet   ---  PMH -  Arthritis  Chest pain  Essential hypertension, benign  GERD (gastroesophageal reflux disease)  H/O: Bell's palsy  Morbid obesity (HCC)  DANIELLE on CPAP  Primary cardiomyopathy (HCC)  Unspecified sleep apnea  Urinary incontinence  Wears glasses:[id number]}        Objective     /80   Pulse 81   Temp 97 °F (36.1 °C) (Temporal)   Ht 1.88 m (6' 2\")   Wt (!) 175 kg (385 lb 12.8 oz)   SpO2 96%   BMI 49.53 kg/m²      Physical Exam  Vitals and nursing note reviewed.   Constitutional:       General: He is not in acute distress.     Appearance: Normal appearance. He is morbidly obese.   Cardiovascular:      Rate and Rhythm: Normal rate and regular rhythm.      Pulses: Normal pulses.      Heart sounds: Normal heart sounds. No murmur heard.     No gallop.   Pulmonary:      Effort: Pulmonary effort is normal.      Breath sounds: Normal breath sounds. No wheezing.   Musculoskeletal:         General: No swelling, tenderness, deformity or signs of injury. Normal range of motion.      Cervical back: Normal range of motion. No tenderness.      Right lower leg: 3+ Pitting Edema present.      Left lower leg: 3+ Pitting Edema present.   Skin:     General: Skin is warm and dry.

## 2025-06-20 NOTE — PROGRESS NOTES
Dr Bear Amin      Date /Time 6/20/2025             9:14 AM EDT  Name Mekhi Robles             1965   [facility]  MRN [id number]                Chief Complaint   Patient presents with    Follow-up     Ck Left Shoulder (no MRI)          History of Present Illness    Mekhi Robles is a 60 y.o. male who presents with  left Shoulder pain.    Sent in consultation by Suleman Navarro MD, .      Athletic/exercise activity: no sports.  Injury Mechanism:  none.  Worker's Comp. & legal issues:   none.  Previous Treatments: Ice, Heat, and NSAIDs    Patient presents to the office today for a follow-up visit.  Patient being treated for advanced left shoulder osteoarthritis.  Patient did receive an intra-articular cortisone injection in the past.  The injection did work well for period of time but pain has returned without any new injury or trauma.    Previous history: Patient presents to the office today for new problem.  Patient sent over by Dr. Cody Sanz.  Patient here with a chief complaint of left shoulder pain.  Patient's pain is over the anterior and posterior aspects.  Increased pain with activities and improvement with rest.  No specific injury or trauma.  Patient has no cervical pain or radicular symptoms.  Dr. Sanz did perform an intra-articular cortisone injection with only mild relief.  Patient has also been participating in rotator cuff strengthening exercises directed by Dr. Sanz.    Past Medical History  Past Medical History:   Diagnosis Date    Arthritis     L hip, R hip ,jey shoulders    Chest pain 01/2011    [facility]--neg labs and stress echo    Essential hypertension, benign     GERD (gastroesophageal reflux disease)     H/O: Bell's palsy 2006    occ has sx    Morbid obesity (HCC)     DANIELLE on CPAP     Primary cardiomyopathy (HCC)     Unspecified sleep apnea     Urinary incontinence     Wears glasses      Past Surgical History:   Procedure Laterality Date    COLONOSCOPY

## 2025-06-27 RX ORDER — NEBIVOLOL 5 MG/1
5 TABLET ORAL DAILY
Qty: 30 TABLET | Refills: 0 | Status: SHIPPED | OUTPATIENT
Start: 2025-06-27

## 2025-06-27 NOTE — TELEPHONE ENCOUNTER
Patient called regarding this refill request.     A 30 tablet supply of this was previously sent to a [facility], he is in the drive though to pick it up and they told him someone from our office called maile and told them to cancel it.     Informed him that I saw no record of that and I don't see who called the [facility] to cancel the previous refill.     Patient got irate because he only has one pill left and he can't wait for the 90 day supply to get to him. Informed him I will be sending a message back to clinical staff to inform them of what's going on and if an emergency refill could be given, patient then said \"I don't even know what to say, I guess I'm completely screwed\" and disconnected the phone.

## 2025-06-27 NOTE — TELEPHONE ENCOUNTER
LAST VISIT 6/20/2025 Luke Glischinski APRN, NEXT VISIT none.     Requesting 90 day supply to be sent.

## 2025-07-01 RX ORDER — NEBIVOLOL 5 MG/1
5 TABLET ORAL DAILY
Qty: 30 TABLET | Refills: 0 | Status: SHIPPED | OUTPATIENT
Start: 2025-07-01

## 2025-07-21 RX ORDER — NEBIVOLOL 5 MG/1
5 TABLET ORAL DAILY
Qty: 30 TABLET | Refills: 11 | Status: SHIPPED | OUTPATIENT
Start: 2025-07-21

## (undated) DEVICE — HOOD: Brand: FLYTE

## (undated) DEVICE — GLOVE ORTHO 7 1/2   MSG9475

## (undated) DEVICE — HOOD, PEEL-AWAY: Brand: FLYTE

## (undated) DEVICE — SOLUTION WND IRRIGATION 450 ML 0.5 PVP-I 0.9 NACL

## (undated) DEVICE — SUTURE ABSORBABLE MONOFILAMENT 1 CTX 36 CM 48 MM VIO PDS +

## (undated) DEVICE — NEEDLE SPNL 20GA L3.5IN YEL HUB S STL REG WALL FIT STYL

## (undated) DEVICE — GOWN,REINF,POLY,AURORA,XLNG/XXL,STRL: Brand: MEDLINE

## (undated) DEVICE — BIPOLAR SEALER 23-112-1 AQM 6.0: Brand: AQUAMANTYS ®

## (undated) DEVICE — HANDPIECE SET WITH HIGH FLOW TIP AND SUCTION TUBE: Brand: INTERPULSE

## (undated) DEVICE — SUTURE STRATAFIX SPRL SZ 2 0 L14IN ABSRB UD MH L36MM 1 2 CIR SXMD2B401

## (undated) DEVICE — Device

## (undated) DEVICE — GLOVE SURG SZ 75 L12IN FNGR THK79MIL GRN LTX FREE

## (undated) DEVICE — DRAPE,UTILITY,TAPE,15X26,STERILE: Brand: MEDLINE

## (undated) DEVICE — ADHESIVE SKIN CLOSURE WND 8.661X1.5 IN 22 CM LIQUIBAND SECUR

## (undated) DEVICE — STERILE PVP: Brand: MEDLINE INDUSTRIES, INC.

## (undated) DEVICE — SUTURE MONOCRYL STRATAFIX SPRL SZ 3-0 L12IN ABSRB UD FS-1 L30X30CM SXMP2B410

## (undated) DEVICE — 6619 2 PTNT ISO SYS INCISE AREA&LT;(&GT;&&LT;)&GT;P: Brand: STERI-DRAPE™ IOBAN™ 2